# Patient Record
Sex: FEMALE | Race: WHITE | NOT HISPANIC OR LATINO | Employment: FULL TIME | URBAN - METROPOLITAN AREA
[De-identification: names, ages, dates, MRNs, and addresses within clinical notes are randomized per-mention and may not be internally consistent; named-entity substitution may affect disease eponyms.]

---

## 2017-07-01 LAB
ABO + RH BLD: NORMAL
C TRACH RRNA SPEC QL PROBE: NEGATIVE
HBV SURFACE AG SERPL QL IA: NEGATIVE
HCT VFR BLD AUTO: 42 % (ref 36–46)
HGB BLD-MCNC: 14 G/DL (ref 12–16)
HIV 1+2 AB+HIV1 P24 AG SERPL QL IA: NEGATIVE
INDIRECT COOMBS: NEGATIVE
MCV RBC AUTO: 89 FL (ref 82–108)
N GONORRHOEAE, AMPLIFIED DNA: NEGATIVE
PLATELET # BLD AUTO: 311 K/ΜL (ref 150–399)
RPR: NEGATIVE
RUBELLA IMMUNE STATUS: NORMAL

## 2017-07-14 ENCOUNTER — TELEPHONE (OUTPATIENT)
Dept: OBSTETRICS AND GYNECOLOGY | Facility: CLINIC | Age: 32
End: 2017-07-14

## 2017-07-14 NOTE — TELEPHONE ENCOUNTER
----- Message from Frederick Granados sent at 7/14/2017 11:46 AM CDT -----  Please call and schedule new ob appt [phone] wks [phone]

## 2017-07-19 ENCOUNTER — TELEPHONE (OUTPATIENT)
Dept: OBSTETRICS AND GYNECOLOGY | Facility: CLINIC | Age: 32
End: 2017-07-19

## 2017-07-19 LAB — EXT MATERNIT21: NEGATIVE

## 2017-07-19 NOTE — TELEPHONE ENCOUNTER
----- Message from Leanna Villegas sent at 7/19/2017  9:01 AM CDT -----  _x  1st Request  _  2nd Request  _  3rd Request        Who: lion    Why: pt. Needs to schedule for first  initial pregnancy visit. Pt. Stating she is 10 weeks.please call to discuss    What Number to Call Back:[phone]    When to Expect a call back: (With in 24 hours)

## 2017-07-21 ENCOUNTER — TELEPHONE (OUTPATIENT)
Dept: OBSTETRICS AND GYNECOLOGY | Facility: CLINIC | Age: 32
End: 2017-07-21

## 2017-07-21 NOTE — TELEPHONE ENCOUNTER
----- Message from Joanne Jean LPN sent at 7/19/2017  5:02 PM CDT -----  [facility]OLINA Staff  Caller: self (Today,  3:44 PM)         Pt returning a missed call, she can be reached at [phone].

## 2017-08-02 ENCOUNTER — INITIAL PRENATAL (OUTPATIENT)
Dept: OBSTETRICS AND GYNECOLOGY | Facility: CLINIC | Age: 32
End: 2017-08-02
Payer: COMMERCIAL

## 2017-08-02 VITALS
DIASTOLIC BLOOD PRESSURE: 68 MMHG | BODY MASS INDEX: 28.33 KG/M2 | SYSTOLIC BLOOD PRESSURE: 116 MMHG | WEIGHT: 186.31 LBS

## 2017-08-02 DIAGNOSIS — Z34.01 ENCOUNTER FOR SUPERVISION OF NORMAL FIRST PREGNANCY IN FIRST TRIMESTER: ICD-10-CM

## 2017-08-02 PROBLEM — Z34.00 PREGNANCY, SUPERVISION, NORMAL, FIRST: Status: ACTIVE | Noted: 2017-08-02

## 2017-08-02 PROCEDURE — 99999 PR PBB SHADOW E&M-EST. PATIENT-LVL III: CPT | Mod: PBBFAC,,, | Performed by: OBSTETRICS & GYNECOLOGY

## 2017-08-02 PROCEDURE — 0502F SUBSEQUENT PRENATAL CARE: CPT | Mod: S$GLB,,, | Performed by: OBSTETRICS & GYNECOLOGY

## 2017-08-02 RX ORDER — BUTALBITAL, ACETAMINOPHEN AND CAFFEINE 300; 40; 50 MG/1; MG/1; MG/1
CAPSULE ORAL
Qty: 30 CAPSULE | Refills: 12 | Status: SHIPPED | OUTPATIENT
Start: 2017-08-02 | End: 2018-01-18 | Stop reason: SDUPTHER

## 2017-08-02 RX ORDER — BUTALBITAL, ACETAMINOPHEN AND CAFFEINE 300; 40; 50 MG/1; MG/1; MG/1
CAPSULE ORAL
Refills: 0 | COMMUNITY
Start: 2017-07-13 | End: 2017-08-02 | Stop reason: SDUPTHER

## 2017-08-02 NOTE — PROGRESS NOTES
Pregnancy with clomid and IUI.  Will request records from [facility].    Counseled to avoid cat litter, not garden without gloves, avoid raw meat, heat up deli meat, to eat large fish like tuna no more than once a week, and to avoid soft unpasteurized cheeses.  I recommend a PNV daily.  She should avoid ibuprofen.

## 2017-08-09 ENCOUNTER — PATIENT MESSAGE (OUTPATIENT)
Dept: OBSTETRICS AND GYNECOLOGY | Facility: CLINIC | Age: 32
End: 2017-08-09

## 2017-08-30 ENCOUNTER — LAB VISIT (OUTPATIENT)
Dept: LAB | Facility: OTHER | Age: 32
End: 2017-08-30
Attending: OBSTETRICS & GYNECOLOGY
Payer: COMMERCIAL

## 2017-08-30 ENCOUNTER — ROUTINE PRENATAL (OUTPATIENT)
Dept: OBSTETRICS AND GYNECOLOGY | Facility: CLINIC | Age: 32
End: 2017-08-30
Payer: COMMERCIAL

## 2017-08-30 VITALS — BODY MASS INDEX: 28.66 KG/M2 | DIASTOLIC BLOOD PRESSURE: 74 MMHG | SYSTOLIC BLOOD PRESSURE: 118 MMHG | WEIGHT: 188.5 LBS

## 2017-08-30 DIAGNOSIS — Z34.02 ENCOUNTER FOR SUPERVISION OF NORMAL FIRST PREGNANCY IN SECOND TRIMESTER: ICD-10-CM

## 2017-08-30 DIAGNOSIS — Z34.02 ENCOUNTER FOR SUPERVISION OF NORMAL FIRST PREGNANCY IN SECOND TRIMESTER: Primary | ICD-10-CM

## 2017-08-30 PROCEDURE — 36415 COLL VENOUS BLD VENIPUNCTURE: CPT

## 2017-08-30 PROCEDURE — 82105 ALPHA-FETOPROTEIN SERUM: CPT

## 2017-08-30 PROCEDURE — 99999 PR PBB SHADOW E&M-EST. PATIENT-LVL III: CPT | Mod: PBBFAC,,, | Performed by: OBSTETRICS & GYNECOLOGY

## 2017-08-30 PROCEDURE — 0502F SUBSEQUENT PRENATAL CARE: CPT | Mod: S$GLB,,, | Performed by: OBSTETRICS & GYNECOLOGY

## 2017-09-01 LAB
AFP INTERPRETATION: NORMAL
AFP MATERNAL: NEGATIVE
ALPHA FETOPROTEIN MATERNAL: 42.7 NG/ML
ETHNIC ORIGIN: NORMAL
GESTATIONAL AGE (DAYS): 1
GESTATIONAL AGE (WEEKS): 16
GESTATIONAL AGE METHOD: NORMAL
INSULIN DEPEND. DIABETES: NORMAL
M.O.M. ALPHA FETOPROTEIN: 1.47
MATERNAL AGE AT EDD (YRS): 31
MATERNAL WEIGHT (LBS): 188
MULTIPLE GESTATION: NORMAL
SMOKING STATUS FTND: NO

## 2017-09-14 ENCOUNTER — TELEPHONE (OUTPATIENT)
Dept: OBSTETRICS AND GYNECOLOGY | Facility: CLINIC | Age: 32
End: 2017-09-14

## 2017-09-14 NOTE — TELEPHONE ENCOUNTER
----- Message from Lelo Kumar sent at 9/14/2017  2:30 PM CDT -----  Contact: Paula Siu needs to reschedule appointment on 09/28 she will be out of town for work Pt said she can come the following week .Pt can be reached at [phone]

## 2017-10-02 ENCOUNTER — OFFICE VISIT (OUTPATIENT)
Dept: MATERNAL FETAL MEDICINE | Facility: CLINIC | Age: 32
End: 2017-10-02
Attending: OBSTETRICS & GYNECOLOGY
Payer: COMMERCIAL

## 2017-10-02 DIAGNOSIS — Z36.89 ENCOUNTER FOR ULTRASOUND TO CHECK FETAL GROWTH: Primary | ICD-10-CM

## 2017-10-02 DIAGNOSIS — Z34.02 ENCOUNTER FOR SUPERVISION OF NORMAL FIRST PREGNANCY IN SECOND TRIMESTER: ICD-10-CM

## 2017-10-02 DIAGNOSIS — Z36.89 ENCOUNTER FOR FETAL ANATOMIC SURVEY: ICD-10-CM

## 2017-10-02 PROCEDURE — 99499 UNLISTED E&M SERVICE: CPT | Mod: S$GLB,,, | Performed by: OBSTETRICS & GYNECOLOGY

## 2017-10-02 PROCEDURE — 76805 OB US >/= 14 WKS SNGL FETUS: CPT | Mod: S$GLB,,, | Performed by: OBSTETRICS & GYNECOLOGY

## 2017-10-02 NOTE — PROGRESS NOTES
"OB Ultrasound Report (see PDF version under imaging tab)    Indication  ========    Fetal anatomy survey.    Pregnancy History  ===============    Maternal Lab Tests  Test: Cell free fetal DNA analysis  Result:  [id number] negative    Method  ======    Transabdominal ultrasound examination. View: Good view.    Pregnancy  =========    [place] pregnancy. Number of fetuses: 1.    Dating  ======    Cycle: regular cycle  GA by "stated dating" 20 w + 5 d  SHARLA by "stated dating": 2/14/2018  Ultrasound examination on: 10/2/2017  GA by U/S based upon: AC, BPD, Femur, HC  GA by U/S 20 w + 6 d  SHARLA by U/S: 2/13/2018  Assigned: Dating performed on 10/2/2017, based on the external assessment  Assigned GA 20 w + 5 d  Assigned SHARLA: 2/14/2018    General Evaluation  ===============    Cardiac activity: present.  bpm.  Fetal movements: visualized.  Presentation: cephalic.  Placenta: posterior.  Umbilical cord: normal, 3 vessel cord.  Amniotic fluid: normal amount.    Fetal Biometry  ===========    Fetal Biometry  BPD 46.6 mm 20w 1d Hadlock  OFD 65.3 mm 22w 1d Jermaine  .7 mm 20w 4d Hadlock  .9 mm 21w 1d Hadlock  Femur 37.0 mm 21w 5d Hadlock  Cerebellum tr 21.1 mm 20w 5d Still  CM 5.3 mm  Humerus 31.7 mm 20w 4d Jermaine   g 44% Yanick  Calculated by: Hadlock (BPD-HC-AC-FL)  EFW (lb) 0 lb  EFW (oz) 15 oz  Cephalic index 0.71  HC / AC 1.14  FL / BPD 0.79  FL / AC 0.23   bpm  Head / Face / Neck   6.2 mm  Nasal bone 6.5 mm    Fetal Anatomy  ===========    Cranium: normal  Lateral ventricles: normal  Choroid plexus: normal  Midline falx: normal  Cavum septi pellucidi: normal  Cerebellum: normal  Cisterna magna: normal  Rt lateral ventricle: normal  Lt lateral ventricle: normal  Rt choroid plexus: normal  Lt choroid plexus: normal  Lips: normal  Profile: normal  Nose: normal  4-chamber view: normal  RVOT: normal  LVOT: normal  Situs: normal  Cardiac position: normal  Cardiac axis: normal  Cardiac " size: normal  Cardiac rhythm: normal  Rt lung: normal  Lt lung: normal  Diaphragm: normal  Cord insertion: normal  Stomach: normal  Kidneys: normal  Bladder: normal  Genitals: normal  Abdom. wall: appears normal  Abdom. cavity: normal  Rt kidney: normal  Lt kidney: normal  Cervical spine: suboptimal  Thoracic spine: suboptimal  Lumbar spine: suboptimal  Sacral spine: suboptimal  Arms: normal  Legs: normal  Rt arm: normal  Lt arm: normal  Rt hand: present  Lt hand: present  Rt leg: normal  Lt leg: normal  Rt foot: normal  Lt foot: normal    Maternal Structures  ===============    Uterus / Cervix  Uterus: Normal  Cervical length 46.5 mm  Ovaries / Tubes / Adnexa  Rt ovary: Visualized  Lt ovary: Visualized    Impression  =========    A tinajero living IUP is identified. Fetal size is appropriate for established dating. No fetal structural malformations are identified;  however, the anatomic survey is incomplete as noted above. The patient will be scheduled for a f/u exam to complete the anatomic  survey. Cervical length is normal, and placental location is normal without evidence of previa. The placental cord insertion site cannot  be reliably ascertained today and will be reassessed at the follow-up exam.

## 2017-10-04 ENCOUNTER — ROUTINE PRENATAL (OUTPATIENT)
Dept: OBSTETRICS AND GYNECOLOGY | Facility: CLINIC | Age: 32
End: 2017-10-04
Payer: COMMERCIAL

## 2017-10-04 VITALS — SYSTOLIC BLOOD PRESSURE: 124 MMHG | BODY MASS INDEX: 28.96 KG/M2 | DIASTOLIC BLOOD PRESSURE: 64 MMHG | WEIGHT: 190.5 LBS

## 2017-10-04 DIAGNOSIS — Z34.02 ENCOUNTER FOR SUPERVISION OF NORMAL FIRST PREGNANCY IN SECOND TRIMESTER: Primary | ICD-10-CM

## 2017-10-04 PROCEDURE — 0502F SUBSEQUENT PRENATAL CARE: CPT | Mod: S$GLB,,, | Performed by: OBSTETRICS & GYNECOLOGY

## 2017-10-04 PROCEDURE — 87086 URINE CULTURE/COLONY COUNT: CPT

## 2017-10-04 PROCEDURE — 99999 PR PBB SHADOW E&M-EST. PATIENT-LVL III: CPT | Mod: PBBFAC,,, | Performed by: OBSTETRICS & GYNECOLOGY

## 2017-10-04 NOTE — PROGRESS NOTES
Good fm.  Denies ctx, vb, lof.  follow up mfm on 10/31.  Will get flu shot at 's work.  Glucola and cbc on rtc.

## 2017-10-05 LAB — BACTERIA UR CULT: NORMAL

## 2017-10-31 ENCOUNTER — OFFICE VISIT (OUTPATIENT)
Dept: MATERNAL FETAL MEDICINE | Facility: CLINIC | Age: 32
End: 2017-10-31
Attending: OBSTETRICS & GYNECOLOGY
Payer: COMMERCIAL

## 2017-10-31 DIAGNOSIS — Z36.89 ENCOUNTER FOR ULTRASOUND TO CHECK FETAL GROWTH: ICD-10-CM

## 2017-10-31 DIAGNOSIS — O43.199 MARGINAL INSERTION OF UMBILICAL CORD AFFECTING MANAGEMENT OF MOTHER: Primary | ICD-10-CM

## 2017-10-31 PROCEDURE — 99212 OFFICE O/P EST SF 10 MIN: CPT | Mod: 25,S$GLB,, | Performed by: OBSTETRICS & GYNECOLOGY

## 2017-10-31 PROCEDURE — 76816 OB US FOLLOW-UP PER FETUS: CPT | Mod: S$GLB,,, | Performed by: OBSTETRICS & GYNECOLOGY

## 2017-10-31 NOTE — PROGRESS NOTES
"OB Ultrasound Report (see PDF version under imaging tab) and brief consult    Indication  ========    Evaluation of fetal growth Follow-up evaluation of anatomy.    Pregnancy History  ===============    Maternal Lab Tests  Test: Cell free fetal DNA analysis  Result:  [id number] negative    Method  ======    Transabdominal ultrasound examination.    Pregnancy  =========    [place]co pregnancy. Number of fetuses: 1.    Dating  ======    Cycle: regular cycle  GA by "stated dating" 24 w + 6 d  SHARLA by "stated dating": 2/14/2018  Ultrasound examination on: 10/31/2017  GA by U/S based upon: AC, BPD, Femur, HC  GA by U/S 24 w + 5 d  SHARLA by U/S: 2/15/2018  Assigned: Dating performed on 10/2/2017, based on the external assessment  Assigned GA 24 w + 6 d  Assigned SHARLA: 2/14/2018    General Evaluation  ===============    Cardiac activity: present.  bpm.  Fetal movements: visualized.  Presentation: breech.  Placenta: posterior.  Umbilical cord: Cord vessels: 3 vessel cord. Cord insertion: placental insertion: marginal.  Amniotic fluid: normal amount.    Fetal Biometry  ===========    Fetal Biometry  BPD 59.3 mm 24w 2d Hadlock  OFD 78.5 mm 25w 4d Jermaine  .7 mm 24w 3d Hadlock  .3 mm 25w 3d Hadlock  Femur 44.9 mm 24w 6d Hadlock   g 51% Yanick  Calculated by: Hadlock (BPD-HC-AC-FL)  EFW (lb) 1 lb  EFW (oz) 11 oz  Cephalic index 0.76  HC / AC 1.07  FL / BPD 0.76  FL / AC 0.21   bpm    Fetal Anatomy  ===========    Cranium: normal  Posterior fossa: normal  4-chamber view: normal  Stomach: normal  Kidneys: normal  Bladder: normal  Cervical spine: normal  Thoracic spine: normal  Lumbar spine: normal  Sacral spine: normal  Other: A full anatomy survey previously performed.    Consultation  ==========    Type: Abnormal Ultrasound Finding.  I spent 10 minutes on the consultation today with the patient and her spouse regarding findings from today's ultrasound exam. More  than 50% of the consultation was " spent in face-to-face counseling and coordination of care.  A marginal placental cord insertion site (cord inserted onto placenta less than 1 cm from the placental edge) is identified. Marginal  cord insertions (MCI) have a modest association with fetal growth restriction; therefore, a follow-up exam to assess fetal growth at 32 -  34 weeks is recommended and will be scheduled. Additionally, the placental cord insertion site will be reexamined at the follow-up  study. If the placental cord insertion site is located in the lower uterine segment, will also assess for vasa previa by transvaginal scan  with color flow imaging. These possible outcomes and plan for follow-up were discussed with the patient today.    Impression  =========    A follow up fetal anatomical ultrasound was completed today.  The fetal anatomic survey was completed today, and no fetal structural abnormalities were noted. Interval fetal growth has been  normal, and the AFV is normal.  The placental cord insertion site is marginal at the fundal edge of the placenta. A f/u scan will be scheduled at 32 weeks to assess fetal  growth and reassess cord insertion site.

## 2017-11-01 ENCOUNTER — ROUTINE PRENATAL (OUTPATIENT)
Dept: OBSTETRICS AND GYNECOLOGY | Facility: CLINIC | Age: 32
End: 2017-11-01
Payer: COMMERCIAL

## 2017-11-01 VITALS
BODY MASS INDEX: 29.36 KG/M2 | SYSTOLIC BLOOD PRESSURE: 122 MMHG | WEIGHT: 193.13 LBS | DIASTOLIC BLOOD PRESSURE: 74 MMHG

## 2017-11-01 DIAGNOSIS — Z34.02 ENCOUNTER FOR SUPERVISION OF NORMAL FIRST PREGNANCY IN SECOND TRIMESTER: Primary | ICD-10-CM

## 2017-11-01 PROCEDURE — 0502F SUBSEQUENT PRENATAL CARE: CPT | Mod: S$GLB,,, | Performed by: OBSTETRICS & GYNECOLOGY

## 2017-11-01 PROCEDURE — 99999 PR PBB SHADOW E&M-EST. PATIENT-LVL II: CPT | Mod: PBBFAC,,, | Performed by: OBSTETRICS & GYNECOLOGY

## 2017-11-05 ENCOUNTER — PATIENT MESSAGE (OUTPATIENT)
Dept: OBSTETRICS AND GYNECOLOGY | Facility: CLINIC | Age: 32
End: 2017-11-05

## 2017-11-06 ENCOUNTER — CLINICAL SUPPORT (OUTPATIENT)
Dept: OBSTETRICS AND GYNECOLOGY | Facility: CLINIC | Age: 32
End: 2017-11-06
Payer: COMMERCIAL

## 2017-11-06 ENCOUNTER — LAB VISIT (OUTPATIENT)
Dept: LAB | Facility: OTHER | Age: 32
End: 2017-11-06
Attending: NURSE PRACTITIONER
Payer: COMMERCIAL

## 2017-11-06 ENCOUNTER — OFFICE VISIT (OUTPATIENT)
Dept: OBSTETRICS AND GYNECOLOGY | Facility: CLINIC | Age: 32
End: 2017-11-06
Payer: COMMERCIAL

## 2017-11-06 VITALS
SYSTOLIC BLOOD PRESSURE: 124 MMHG | DIASTOLIC BLOOD PRESSURE: 84 MMHG | WEIGHT: 188.94 LBS | BODY MASS INDEX: 28.63 KG/M2 | HEIGHT: 68 IN

## 2017-11-06 DIAGNOSIS — O46.90 VAGINAL BLEEDING IN PREGNANCY: ICD-10-CM

## 2017-11-06 DIAGNOSIS — O46.90 VAGINAL BLEEDING IN PREGNANCY: Primary | ICD-10-CM

## 2017-11-06 DIAGNOSIS — O34.42: ICD-10-CM

## 2017-11-06 DIAGNOSIS — N84.1: ICD-10-CM

## 2017-11-06 LAB
ABO + RH BLD: NORMAL
BLD GP AB SCN CELLS X3 SERPL QL: NORMAL
RH BLD: NORMAL

## 2017-11-06 PROCEDURE — 86850 RBC ANTIBODY SCREEN: CPT

## 2017-11-06 PROCEDURE — 96372 THER/PROPH/DIAG INJ SC/IM: CPT | Mod: S$GLB,,, | Performed by: NURSE PRACTITIONER

## 2017-11-06 PROCEDURE — 99213 OFFICE O/P EST LOW 20 MIN: CPT | Mod: S$GLB,,, | Performed by: OBSTETRICS & GYNECOLOGY

## 2017-11-06 PROCEDURE — 86900 BLOOD TYPING SEROLOGIC ABO: CPT

## 2017-11-06 PROCEDURE — 86901 BLOOD TYPING SEROLOGIC RH(D): CPT

## 2017-11-06 PROCEDURE — 99999 PR PBB SHADOW E&M-EST. PATIENT-LVL II: CPT | Mod: PBBFAC,,,

## 2017-11-06 PROCEDURE — 36415 COLL VENOUS BLD VENIPUNCTURE: CPT

## 2017-11-06 PROCEDURE — 99999 PR PBB SHADOW E&M-EST. PATIENT-LVL I: CPT | Mod: PBBFAC,,,

## 2017-11-06 NOTE — PROGRESS NOTES
Here for rhogam injection , no complaints at this time. Verified patient is RH negative.   No complaint of pain before or after injection. Patient advised to wait 15 minutes before leaving and report any adverse reactions.      Site: LB

## 2017-11-06 NOTE — PROGRESS NOTES
Pt [facility] at 25w5d, with  complaints of vaginal bleeding.  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  Reports spotting continued for 2 days- went from bright red to pink and is now brown.  Reports some associated cramping.  Spec exam- no bleeding, cervix is closed visually.  + cervical polyp noted to os.  Pt is Rh negative- Type and screen and Rhogam today and repeat at 28 weeks.  Pain, bleeding, and PTL precautions given.  F/U scheduled 3 weeks

## 2017-11-27 ENCOUNTER — LAB VISIT (OUTPATIENT)
Dept: LAB | Facility: OTHER | Age: 32
End: 2017-11-27
Attending: OBSTETRICS & GYNECOLOGY
Payer: COMMERCIAL

## 2017-11-27 DIAGNOSIS — Z34.02 ENCOUNTER FOR SUPERVISION OF NORMAL FIRST PREGNANCY IN SECOND TRIMESTER: ICD-10-CM

## 2017-11-27 LAB
ABO + RH BLD: NORMAL
BASOPHILS # BLD AUTO: 0.01 K/UL
BASOPHILS NFR BLD: 0.1 %
BLD GP AB SCN CELLS X3 SERPL QL: NORMAL
BLOOD GROUP ANTIBODIES SERPL: NORMAL
DAT IGG-SP REAG RBC-IMP: NORMAL
DIFFERENTIAL METHOD: ABNORMAL
EOSINOPHIL # BLD AUTO: 0.1 K/UL
EOSINOPHIL NFR BLD: 1.6 %
ERYTHROCYTE [DISTWIDTH] IN BLOOD BY AUTOMATED COUNT: 14.1 %
GLUCOSE SERPL-MCNC: 120 MG/DL
HCT VFR BLD AUTO: 38.3 %
HGB BLD-MCNC: 12.3 G/DL
LYMPHOCYTES # BLD AUTO: 1.3 K/UL
LYMPHOCYTES NFR BLD: 15.2 %
MCH RBC QN AUTO: 29.1 PG
MCHC RBC AUTO-ENTMCNC: 32.1 G/DL
MCV RBC AUTO: 91 FL
MONOCYTES # BLD AUTO: 0.6 K/UL
MONOCYTES NFR BLD: 7.1 %
NEUTROPHILS # BLD AUTO: 6.5 K/UL
NEUTROPHILS NFR BLD: 75.1 %
PLATELET # BLD AUTO: 266 K/UL
PMV BLD AUTO: 11.3 FL
RBC # BLD AUTO: 4.22 M/UL
WBC # BLD AUTO: 8.68 K/UL

## 2017-11-27 PROCEDURE — 86901 BLOOD TYPING SEROLOGIC RH(D): CPT

## 2017-11-27 PROCEDURE — 86900 BLOOD TYPING SEROLOGIC ABO: CPT

## 2017-11-27 PROCEDURE — 85025 COMPLETE CBC W/AUTO DIFF WBC: CPT

## 2017-11-27 PROCEDURE — 86870 RBC ANTIBODY IDENTIFICATION: CPT

## 2017-11-27 PROCEDURE — 82950 GLUCOSE TEST: CPT

## 2017-11-27 PROCEDURE — 86880 COOMBS TEST DIRECT: CPT

## 2017-11-27 PROCEDURE — 36415 COLL VENOUS BLD VENIPUNCTURE: CPT

## 2017-11-29 ENCOUNTER — ROUTINE PRENATAL (OUTPATIENT)
Dept: OBSTETRICS AND GYNECOLOGY | Facility: CLINIC | Age: 32
End: 2017-11-29
Payer: COMMERCIAL

## 2017-11-29 VITALS
WEIGHT: 196.88 LBS | DIASTOLIC BLOOD PRESSURE: 80 MMHG | SYSTOLIC BLOOD PRESSURE: 120 MMHG | BODY MASS INDEX: 29.93 KG/M2

## 2017-11-29 DIAGNOSIS — Z34.03 ENCOUNTER FOR SUPERVISION OF NORMAL FIRST PREGNANCY IN THIRD TRIMESTER: Primary | ICD-10-CM

## 2017-11-29 PROCEDURE — 0502F SUBSEQUENT PRENATAL CARE: CPT | Mod: S$GLB,,, | Performed by: OBSTETRICS & GYNECOLOGY

## 2017-11-29 PROCEDURE — 99999 PR PBB SHADOW E&M-EST. PATIENT-LVL III: CPT | Mod: PBBFAC,,, | Performed by: OBSTETRICS & GYNECOLOGY

## 2017-11-29 RX ORDER — PSEUDOEPHEDRINE HCL 120 MG/1
120 TABLET, FILM COATED, EXTENDED RELEASE ORAL
Status: ON HOLD | COMMUNITY
End: 2018-02-08 | Stop reason: HOSPADM

## 2017-11-29 NOTE — PROGRESS NOTES
Good fm.  Denies ctx, vb, lof.  She complains of URI.  No fevers.  Recommend OTC meds.  If fever or worsening symptoms will treat with zpack.

## 2017-12-12 ENCOUNTER — ROUTINE PRENATAL (OUTPATIENT)
Dept: OBSTETRICS AND GYNECOLOGY | Facility: CLINIC | Age: 32
End: 2017-12-12
Payer: COMMERCIAL

## 2017-12-12 VITALS — BODY MASS INDEX: 30.64 KG/M2 | DIASTOLIC BLOOD PRESSURE: 78 MMHG | WEIGHT: 201.5 LBS | SYSTOLIC BLOOD PRESSURE: 118 MMHG

## 2017-12-12 DIAGNOSIS — Z34.03 ENCOUNTER FOR SUPERVISION OF NORMAL FIRST PREGNANCY IN THIRD TRIMESTER: Primary | ICD-10-CM

## 2017-12-12 PROCEDURE — 0502F SUBSEQUENT PRENATAL CARE: CPT | Mod: S$GLB,,, | Performed by: OBSTETRICS & GYNECOLOGY

## 2017-12-12 PROCEDURE — 99999 PR PBB SHADOW E&M-EST. PATIENT-LVL II: CPT | Mod: PBBFAC,,, | Performed by: OBSTETRICS & GYNECOLOGY

## 2017-12-12 NOTE — PROGRESS NOTES
Complaints today: None. No ctxns, no vb, no LOF. Normal fetal movement.    /78   Wt 91.4 kg (201 lb 8 oz)   LMP 05/10/2017   BMI 30.64 kg/m²     31 y.o., at 30w6d by Estimated Date of Delivery: 18  Patient Active Problem List   Diagnosis    PCOS (polycystic ovarian syndrome)    Pregnancy, supervision, normal, first/breast/condom/flu     OB History    Para Term  AB Living   1 0 0 0 0 0   SAB TAB Ectopic Multiple Live Births   0 0 0 0        # Outcome Date GA Lbr Jose Alejandro/2nd Weight Sex Delivery Anes PTL Lv   1 Current                   Dating reviewed    Allergies and problem list reviewed and updated    Medical and surgical history reviewed    Prenatal labs reviewed and updated    Physical Exam:  ABD: soft, gravid, nontender    Assessment/Plan:  30 yo  at 30 and 6  --tdap next week  --maternity leave papers collected, will fax to employer  --f/u 2 weeks

## 2017-12-19 ENCOUNTER — OFFICE VISIT (OUTPATIENT)
Dept: MATERNAL FETAL MEDICINE | Facility: CLINIC | Age: 32
End: 2017-12-19
Payer: COMMERCIAL

## 2017-12-19 DIAGNOSIS — Z36.89 ENCOUNTER FOR ULTRASOUND TO CHECK FETAL GROWTH: ICD-10-CM

## 2017-12-19 DIAGNOSIS — O43.199 MARGINAL INSERTION OF UMBILICAL CORD AFFECTING MANAGEMENT OF MOTHER: ICD-10-CM

## 2017-12-19 PROCEDURE — 99499 UNLISTED E&M SERVICE: CPT | Mod: S$GLB,,, | Performed by: OBSTETRICS & GYNECOLOGY

## 2017-12-19 PROCEDURE — 76816 OB US FOLLOW-UP PER FETUS: CPT | Mod: S$GLB,,, | Performed by: OBSTETRICS & GYNECOLOGY

## 2017-12-19 NOTE — PROGRESS NOTES
"OB Ultrasound Report (see PDF version under imaging tab)    Indication  ========    Evaluation of fetal growth; marginal cord insertion site.    Pregnancy History  ===============    Maternal Lab Tests  Test: Cell free fetal DNA analysis  Result:  [id number] negative    Method  ======    Transabdominal ultrasound examination.    Pregnancy  =========    [place]co pregnancy. Number of fetuses: 1.    Dating  ======    Cycle: regular cycle  GA by "stated dating" 31 w + 6 d  SHARLA by "stated dating": 2/14/2018  Ultrasound examination on: 12/19/2017  GA by U/S based upon: AC, BPD, Femur, HC  GA by U/S 31 w + 0 d  SHARLA by U/S: 2/20/2018  Assigned: Dating performed on 10/2/2017, based on the external assessment  Assigned GA 31 w + 6 d  Assigned SHARLA: 2/14/2018    General Evaluation  ===============    Cardiac activity: present.  bpm.  Fetal movements: visualized.  Presentation: cephalic.  Placenta:  Placental site: posterior.  Umbilical cord: Cord vessels: 3 vessel cord. Cord insertion: placental insertion: marginal (fundal end of placenta).  Amniotic fluid: MVP 4.4 cm.    Fetal Biometry  ===========    Fetal Biometry  BPD 76.8 mm 30w 6d Hadlock  .4 mm 32w 3d Jermaine  .0 mm 31w 1d Hadlock  .5 mm 31w 0d Hadlock  Femur 59.1 mm 30w 6d Hadlock  EFW 1,665 g 18% Yanick  Calculated by: Hadlock (BPD-HC-AC-FL)  EFW (lb) 3 lb  EFW (oz) 11 oz  Cephalic index 0.76  HC / AC 1.06  FL / BPD 0.77  FL / AC 0.22  MVP 4.4 cm   bpm    Fetal Anatomy  ===========    Cranium: normal  4-chamber view: normal  Stomach: normal  Kidneys: normal  Bladder: normal  Other: A full anatomy survey previously performed.    Impression  =========    Fetal size is AGA with the EFW at the 18th percentile.  Normal repeat limited fetal anatomic survey. AFV is normal. The marginal cord insertion into the fundal edge of the placenta is  confirmed. Follow-up ultrasound scheduled in 4 weeks to assess fetal growth.    "

## 2017-12-22 ENCOUNTER — CLINICAL SUPPORT (OUTPATIENT)
Dept: OBSTETRICS AND GYNECOLOGY | Facility: CLINIC | Age: 32
End: 2017-12-22
Attending: OBSTETRICS & GYNECOLOGY
Payer: COMMERCIAL

## 2017-12-22 PROCEDURE — 90715 TDAP VACCINE 7 YRS/> IM: CPT | Mod: S$GLB,,, | Performed by: OBSTETRICS & GYNECOLOGY

## 2017-12-22 PROCEDURE — 99999 PR PBB SHADOW E&M-EST. PATIENT-LVL I: CPT | Mod: PBBFAC,,,

## 2017-12-22 PROCEDURE — 90471 IMMUNIZATION ADMIN: CPT | Mod: S$GLB,,, | Performed by: OBSTETRICS & GYNECOLOGY

## 2018-01-03 ENCOUNTER — ROUTINE PRENATAL (OUTPATIENT)
Dept: OBSTETRICS AND GYNECOLOGY | Facility: CLINIC | Age: 33
End: 2018-01-03
Payer: COMMERCIAL

## 2018-01-03 VITALS
DIASTOLIC BLOOD PRESSURE: 80 MMHG | BODY MASS INDEX: 31.01 KG/M2 | WEIGHT: 203.94 LBS | SYSTOLIC BLOOD PRESSURE: 130 MMHG

## 2018-01-03 DIAGNOSIS — Z34.03 ENCOUNTER FOR SUPERVISION OF NORMAL FIRST PREGNANCY IN THIRD TRIMESTER: Primary | ICD-10-CM

## 2018-01-03 DIAGNOSIS — O43.193 MARGINAL INSERTION OF UMBILICAL CORD AFFECTING MANAGEMENT OF MOTHER IN THIRD TRIMESTER: ICD-10-CM

## 2018-01-03 PROCEDURE — 99999 PR PBB SHADOW E&M-EST. PATIENT-LVL III: CPT | Mod: PBBFAC,,, | Performed by: STUDENT IN AN ORGANIZED HEALTH CARE EDUCATION/TRAINING PROGRAM

## 2018-01-03 PROCEDURE — 99213 OFFICE O/P EST LOW 20 MIN: CPT | Mod: S$GLB,,, | Performed by: STUDENT IN AN ORGANIZED HEALTH CARE EDUCATION/TRAINING PROGRAM

## 2018-01-03 NOTE — PROGRESS NOTES
Complaints today: Has some sinus congestion. Denies fever, difficulty breathing. No other complaints.     /80   Wt 92.5 kg (203 lb 14.8 oz)   LMP 05/10/2017   BMI 31.01 kg/m²     32 y.o., at 34w0d by Estimated Date of Delivery: 18  Patient Active Problem List   Diagnosis    PCOS (polycystic ovarian syndrome)    Pregnancy, supervision, normal, first/breast/condom/flu    Marginal insertion of umbilical cord affecting management of mother in third trimester     OB History    Para Term  AB Living   1 0 0 0 0 0   SAB TAB Ectopic Multiple Live Births   0 0 0 0        # Outcome Date GA Lbr Jose Alejandro/2nd Weight Sex Delivery Anes PTL Lv   1 Current                   Dating reviewed    Allergies and problem list reviewed and updated    Medical and surgical history reviewed    Prenatal labs reviewed and updated    Physical Exam:  ABD: soft, gravid, nontender, fundal height at 33 cm    Assessment:  G1 at 34w0d    Plan:   1. Marginal cord insertion  - Follow up for growth with MFM later this month  - EFW 18%ile    2. G1 at 34w0d  - GBS next visit  - follow up 2 Weeks, bleeding/pain, kick counts, labor precautions counseled     Bib Baltazar MD  OB/GYN PGY-2  Pager: [phone]

## 2018-01-07 ENCOUNTER — PATIENT MESSAGE (OUTPATIENT)
Dept: OBSTETRICS AND GYNECOLOGY | Facility: CLINIC | Age: 33
End: 2018-01-07

## 2018-01-11 ENCOUNTER — PATIENT MESSAGE (OUTPATIENT)
Dept: ADMINISTRATIVE | Facility: OTHER | Age: 33
End: 2018-01-11

## 2018-01-17 ENCOUNTER — OFFICE VISIT (OUTPATIENT)
Dept: MATERNAL FETAL MEDICINE | Facility: CLINIC | Age: 33
End: 2018-01-17
Payer: COMMERCIAL

## 2018-01-17 ENCOUNTER — HOSPITAL ENCOUNTER (EMERGENCY)
Facility: OTHER | Age: 33
Discharge: HOME | End: 2018-01-17
Attending: OBSTETRICS & GYNECOLOGY
Payer: COMMERCIAL

## 2018-01-17 ENCOUNTER — PATIENT MESSAGE (OUTPATIENT)
Dept: OBSTETRICS AND GYNECOLOGY | Facility: CLINIC | Age: 33
End: 2018-01-17

## 2018-01-17 ENCOUNTER — ROUTINE PRENATAL (OUTPATIENT)
Dept: OBSTETRICS AND GYNECOLOGY | Facility: CLINIC | Age: 33
End: 2018-01-17
Payer: COMMERCIAL

## 2018-01-17 VITALS
SYSTOLIC BLOOD PRESSURE: 140 MMHG | DIASTOLIC BLOOD PRESSURE: 90 MMHG | WEIGHT: 207.88 LBS | BODY MASS INDEX: 31.61 KG/M2

## 2018-01-17 VITALS
SYSTOLIC BLOOD PRESSURE: 116 MMHG | RESPIRATION RATE: 16 BRPM | TEMPERATURE: 97 F | OXYGEN SATURATION: 96 % | DIASTOLIC BLOOD PRESSURE: 73 MMHG | HEART RATE: 89 BPM

## 2018-01-17 DIAGNOSIS — O43.193 MARGINAL INSERTION OF UMBILICAL CORD AFFECTING MANAGEMENT OF MOTHER IN THIRD TRIMESTER: ICD-10-CM

## 2018-01-17 DIAGNOSIS — Z3A.36 36 WEEKS GESTATION OF PREGNANCY: ICD-10-CM

## 2018-01-17 DIAGNOSIS — Z36.89 ENCOUNTER FOR ULTRASOUND TO CHECK FETAL GROWTH: ICD-10-CM

## 2018-01-17 DIAGNOSIS — Z34.03 ENCOUNTER FOR SUPERVISION OF NORMAL FIRST PREGNANCY IN THIRD TRIMESTER: Primary | ICD-10-CM

## 2018-01-17 DIAGNOSIS — G44.209 ACUTE NON INTRACTABLE TENSION-TYPE HEADACHE: ICD-10-CM

## 2018-01-17 DIAGNOSIS — O43.199 MARGINAL INSERTION OF UMBILICAL CORD AFFECTING MANAGEMENT OF MOTHER: ICD-10-CM

## 2018-01-17 DIAGNOSIS — O13.9 GESTATIONAL HYPERTENSION AFFECTING FIRST PREGNANCY: Primary | ICD-10-CM

## 2018-01-17 LAB
ALBUMIN SERPL BCP-MCNC: 3.1 G/DL
ALP SERPL-CCNC: 114 U/L
ALT SERPL W/O P-5'-P-CCNC: 14 U/L
ANION GAP SERPL CALC-SCNC: 7 MMOL/L
AST SERPL-CCNC: 12 U/L
BASOPHILS # BLD AUTO: 0.02 K/UL
BASOPHILS NFR BLD: 0.2 %
BILIRUB SERPL-MCNC: 0.3 MG/DL
BUN SERPL-MCNC: 8 MG/DL
CALCIUM SERPL-MCNC: 9 MG/DL
CHLORIDE SERPL-SCNC: 106 MMOL/L
CO2 SERPL-SCNC: 23 MMOL/L
CREAT SERPL-MCNC: 0.7 MG/DL
CREAT UR-MCNC: 17.6 MG/DL
DIFFERENTIAL METHOD: NORMAL
EOSINOPHIL # BLD AUTO: 0.1 K/UL
EOSINOPHIL NFR BLD: 1.1 %
ERYTHROCYTE [DISTWIDTH] IN BLOOD BY AUTOMATED COUNT: 14 %
EST. GFR  (AFRICAN AMERICAN): >60 ML/MIN/1.73 M^2
EST. GFR  (NON AFRICAN AMERICAN): >60 ML/MIN/1.73 M^2
GLUCOSE SERPL-MCNC: 79 MG/DL
HCT VFR BLD AUTO: 40.1 %
HGB BLD-MCNC: 13.3 G/DL
HIV 1+2 AB+HIV1 P24 AG SERPL QL IA: NEGATIVE
HIV1+2 IGG SERPL QL IA.RAPID: NEGATIVE
LDH SERPL L TO P-CCNC: 159 U/L
LYMPHOCYTES # BLD AUTO: 1.8 K/UL
LYMPHOCYTES NFR BLD: 19.8 %
MCH RBC QN AUTO: 29.8 PG
MCHC RBC AUTO-ENTMCNC: 33.2 G/DL
MCV RBC AUTO: 90 FL
MONOCYTES # BLD AUTO: 0.7 K/UL
MONOCYTES NFR BLD: 7.4 %
NEUTROPHILS # BLD AUTO: 6.4 K/UL
NEUTROPHILS NFR BLD: 70.8 %
PLATELET # BLD AUTO: 232 K/UL
PMV BLD AUTO: 12 FL
POTASSIUM SERPL-SCNC: 3.9 MMOL/L
PROT SERPL-MCNC: 6.4 G/DL
PROT UR-MCNC: <7 MG/DL
PROT/CREAT RATIO, UR: NORMAL
RBC # BLD AUTO: 4.46 M/UL
RPR SER QL: NORMAL
SODIUM SERPL-SCNC: 136 MMOL/L
WBC # BLD AUTO: 9.07 K/UL

## 2018-01-17 PROCEDURE — 99999 PR PBB SHADOW E&M-EST. PATIENT-LVL III: CPT | Mod: PBBFAC,,, | Performed by: OBSTETRICS & GYNECOLOGY

## 2018-01-17 PROCEDURE — 99499 UNLISTED E&M SERVICE: CPT | Mod: S$GLB,,, | Performed by: OBSTETRICS & GYNECOLOGY

## 2018-01-17 PROCEDURE — 86703 HIV-1/HIV-2 1 RESULT ANTBDY: CPT | Mod: 91

## 2018-01-17 PROCEDURE — 0502F SUBSEQUENT PRENATAL CARE: CPT | Mod: S$GLB,,, | Performed by: OBSTETRICS & GYNECOLOGY

## 2018-01-17 PROCEDURE — 80053 COMPREHEN METABOLIC PANEL: CPT

## 2018-01-17 PROCEDURE — 87086 URINE CULTURE/COLONY COUNT: CPT

## 2018-01-17 PROCEDURE — 76816 OB US FOLLOW-UP PER FETUS: CPT | Mod: S$GLB,,, | Performed by: OBSTETRICS & GYNECOLOGY

## 2018-01-17 PROCEDURE — 84156 ASSAY OF PROTEIN URINE: CPT

## 2018-01-17 PROCEDURE — 59025 FETAL NON-STRESS TEST: CPT

## 2018-01-17 PROCEDURE — 99284 EMERGENCY DEPT VISIT MOD MDM: CPT | Mod: 25,,, | Performed by: OBSTETRICS & GYNECOLOGY

## 2018-01-17 PROCEDURE — 87081 CULTURE SCREEN ONLY: CPT

## 2018-01-17 PROCEDURE — 83615 LACTATE (LD) (LDH) ENZYME: CPT

## 2018-01-17 PROCEDURE — 86592 SYPHILIS TEST NON-TREP QUAL: CPT

## 2018-01-17 PROCEDURE — 85025 COMPLETE CBC W/AUTO DIFF WBC: CPT

## 2018-01-17 PROCEDURE — 86703 HIV-1/HIV-2 1 RESULT ANTBDY: CPT

## 2018-01-17 PROCEDURE — 99283 EMERGENCY DEPT VISIT LOW MDM: CPT | Mod: 25

## 2018-01-17 PROCEDURE — 59025 FETAL NON-STRESS TEST: CPT | Mod: 26,,, | Performed by: OBSTETRICS & GYNECOLOGY

## 2018-01-17 NOTE — PROGRESS NOTES
Good fm.  Denies ctx, vb, lof.  She had bad headache last night and vague RUG pain.  bp by me 140/90.  Will send to ob ed for eval.  They will add hiv and rpr to [facility] labs.  gbs done

## 2018-01-17 NOTE — ED TRIAGE NOTES
Pt presented to [facility] from [facility].  Pt had a headache (rated 6/10) yesterday and last night.  Took 2 Fioricets last night and now has no pain.  However, pt's BP has been running 140s/90s and she has been experiencing RUQ pain.  Pt endorses FM and denies VB, LOF, and ctx.  Dr. Jiménez notified of pt's arrival.  Pt's first /97.

## 2018-01-17 NOTE — DISCHARGE INSTRUCTIONS
Return to ER for worsening headache or visual changes. Return to ER for sign/symptoms of labor. Return for blood pressures greater than or equal to 160/100

## 2018-01-17 NOTE — ED PROVIDER NOTES
Encounter Date: 2018       History   No chief complaint on file.    Paula Armendariz is a 32 y.o. [id number] at 36w0d presents for pre-eclampsia workup following elevated blood pressure readings in clinic. She has had mild RUQ pain for one week. Piercing headache last night, fioricet helped initially but it recurred. States her BP has been steadily increasing over the last week. Denies current headache, RUQ, SOB, chest pain, or vision changes.  Patient denies regular contractions, vaginal bleeding, LOF.   Fetal Movement: normal.  This IUP is complicated by marginal cord insertion, EFW 13%.          Review of patient's allergies indicates:  No Known Allergies  Past Medical History:   Diagnosis Date    PCOS (polycystic ovarian syndrome)      Past Surgical History:   Procedure Laterality Date    TONSILLECTOMY       Family History   Problem Relation Age of Onset    Diabetes Maternal Grandmother     Diabetes Maternal Grandfather     Ovarian cancer Neg Hx     Eclampsia Neg Hx      Social History   Substance Use Topics    Smoking status: Never Smoker    Smokeless tobacco: Never Used    Alcohol use No      Comment: socially     Review of Systems   Constitutional: Negative for chills and fatigue.   Eyes: Negative for visual disturbance.   Respiratory: Negative for shortness of breath.    Cardiovascular: Negative for chest pain.   Gastrointestinal: Negative for abdominal pain (no current RUQ pain).   Genitourinary: Negative for vaginal bleeding and vaginal discharge.   Neurological: Negative for headaches.       Physical Exam     Vitals:    18 1019 18 1036 18 1051   BP: (!) 142/97 130/70 123/78   Pulse: 106 90 90   Resp: 16 18 (P) 16   Temp: 97.2 °F (36.2 °C)     TempSrc: Temporal     SpO2: 99% 99% (P) 99%       Physical Exam    Vitals reviewed.  Constitutional: She appears well-developed and well-nourished. She is not diaphoretic. No distress.   HENT:   Head: Normocephalic and atraumatic.    Eyes: EOM are normal.   Neck: Normal range of motion.   Cardiovascular: Normal rate, regular rhythm and normal heart sounds.   Pulmonary/Chest: Breath sounds normal. No respiratory distress. She has no wheezes. She has no rhonchi. She has no rales.   Abdominal: Soft. There is no tenderness.   Gravid    Neurological: She is alert and oriented to person, place, and time. She has normal reflexes.   Skin: Skin is warm and dry.   Psychiatric: She has a normal mood and affect. Her behavior is normal. Judgment and thought content normal.         ED Course   Obtain Fetal nonstress test (NST)  Date/Time: 1/17/2018 3:12 PM  Performed by: REDDY, SUSHMA K  Authorized by: REDDY, SUSHMA K     Nonstress Test:     Variability:  6-25 BPM    Decelerations:  None    Accelerations:  15 bpm    Acoustic Stimulator: No      Baseline:  140    Uterine Irritability: No      Contractions:  Not present  Biophysical Profile:     Nonstress Test Interpretation: reactive      Overall Impression:  Reassuring      Labs Reviewed   COMPREHENSIVE METABOLIC PANEL - Abnormal; Notable for the following:        Result Value    Albumin 3.1 (*)     Anion Gap 7 (*)     All other components within normal limits   LACTATE DEHYDROGENASE   CBC W/ AUTO DIFFERENTIAL   PROTEIN / CREATININE RATIO, URINE   RAPID HIV   RPR   HIV 1 / 2 ANTIBODY              Medical Decision Making:   ED Management:  NST started on arrival: reactive and reassuring.  Tocometry: irreg contractions  BP series started. BP: (116-142)/(70-97) 116/73.  Pre-E labs drawn. Wnl.  3T labs drawn.  Pre-E ROS: no current symptoms.  Patient discharged in stable condition with precautions. Voiced understanding.  Other:   I have discussed this case with another health care provider.       <> Summary of the Discussion: D/w Dr. Dominguez                   ED Course      Clinical Impression:   The primary encounter diagnosis was Gestational hypertension affecting first pregnancy. Diagnoses of 36 weeks  gestation of pregnancy and Acute non intractable tension-type headache were also pertinent to this visit.    Disposition:   Disposition: Discharged  Condition: Stable                        Sushma K Reddy, MD  Resident  01/17/18 2701

## 2018-01-17 NOTE — PROGRESS NOTES
"OB Ultrasound Report (see PDF version under imaging tab)    Indication  ========    Evaluation of fetal growth, marginal cord insertion site.    Pregnancy History  ===============    Maternal Lab Tests  Test: Cell free fetal DNA analysis  Result:  [id number] negative    Wants to know gender: yes    Method  ======    Transabdominal ultrasound examination.    Pregnancy  =========    [place]co pregnancy. Number of fetuses: 1.    Dating  ======    Cycle: regular cycle  GA by "stated dating" 36 w + 0 d  SHARLA by "stated dating": 2/14/2018  Ultrasound examination on: 1/17/2018  GA by U/S based upon: AC, BPD, Femur, HC  GA by U/S 34 w + 0 d  SHARLA by U/S: 2/28/2018  Assigned: Dating performed on 10/2/2017, based on the external assessment  Assigned GA 36 w + 0 d  Assigned SHARLA: 2/14/2018    General Evaluation  ===============    Cardiac activity: present.  bpm.  Fetal movements: visualized.  Presentation: cephalic.  Placenta: posterior.  Umbilical cord: Cord vessels: 3 vessel cord. Cord insertion: placental insertion: marginal.  Amniotic fluid: MVP 5.8 cm.    Biophysical Profile  ===============    2: Fetal breathing movements  2: Gross body movements  2: Fetal tone  2: Amniotic fluid volume  8/8: Biophysical profile score    Fetal Biometry  ===========    Fetal Biometry  BPD 83.3 mm 33w 4d Hadlock  .2 mm 34w 4d Jermaine  .4 mm 33w 4d Hadlock  .0 mm 33w 5d Hadlock  Femur 68.1 mm 35w 0d Hadlock  EFW 2,431 g 13% Yanick  Calculated by: Hadlock (AC-FL)  EFW (lb) 5 lb  EFW (oz) 6 oz  Cephalic index 0.79  HC / AC 1.01  FL / BPD 0.82  FL / AC 0.23  MVP 5.8 cm   bpm    Fetal Anatomy  ===========    Cranium: normal  Posterior fossa: normal  4-chamber view: documented previously  Stomach: normal  Kidneys: normal  Bladder: normal  Wants to know gender: yes  Other: A full anatomy survey previously performed.    Impression  =========    Fetal size is in the lower normal range with the EFW plotting at the " 13th percentile today.  Normal repeat limited fetal anatomic survey. AFV is normal. Will schedule f/u exam to reassess fetal size in 3 weeks.

## 2018-01-18 LAB — BACTERIA UR CULT: NORMAL

## 2018-01-18 RX ORDER — BUTALBITAL, ACETAMINOPHEN AND CAFFEINE 300; 40; 50 MG/1; MG/1; MG/1
CAPSULE ORAL
Qty: 30 CAPSULE | Refills: 12 | Status: SHIPPED | OUTPATIENT
Start: 2018-01-18 | End: 2018-06-18

## 2018-01-19 LAB
BACTERIA SPEC AEROBE CULT: NORMAL
BACTERIA SPEC AEROBE CULT: NORMAL

## 2018-01-24 ENCOUNTER — ROUTINE PRENATAL (OUTPATIENT)
Dept: OBSTETRICS AND GYNECOLOGY | Facility: CLINIC | Age: 33
End: 2018-01-24
Payer: COMMERCIAL

## 2018-01-24 VITALS
WEIGHT: 208.56 LBS | DIASTOLIC BLOOD PRESSURE: 88 MMHG | SYSTOLIC BLOOD PRESSURE: 140 MMHG | BODY MASS INDEX: 31.71 KG/M2

## 2018-01-24 DIAGNOSIS — Z34.03 ENCOUNTER FOR SUPERVISION OF NORMAL FIRST PREGNANCY IN THIRD TRIMESTER: Primary | ICD-10-CM

## 2018-01-24 PROCEDURE — 99999 PR PBB SHADOW E&M-EST. PATIENT-LVL II: CPT | Mod: PBBFAC,,, | Performed by: ADVANCED PRACTICE MIDWIFE

## 2018-01-24 PROCEDURE — 0502F SUBSEQUENT PRENATAL CARE: CPT | Mod: S$GLB,,, | Performed by: ADVANCED PRACTICE MIDWIFE

## 2018-01-24 NOTE — PROGRESS NOTES
In for routine OB visit. BP slightly elevated, pt denies headache, visual disturbances or epigastric pain. Reports good Fm, reinforced BID. Had negative pre e workup on 01/17/18. Reviewed s/s pre e and need to report if occur. RTC 1 week.

## 2018-01-31 ENCOUNTER — ROUTINE PRENATAL (OUTPATIENT)
Dept: OBSTETRICS AND GYNECOLOGY | Facility: CLINIC | Age: 33
End: 2018-01-31
Payer: COMMERCIAL

## 2018-01-31 ENCOUNTER — TELEPHONE (OUTPATIENT)
Dept: OBSTETRICS AND GYNECOLOGY | Facility: CLINIC | Age: 33
End: 2018-01-31

## 2018-01-31 VITALS
BODY MASS INDEX: 32.05 KG/M2 | SYSTOLIC BLOOD PRESSURE: 122 MMHG | WEIGHT: 210.75 LBS | DIASTOLIC BLOOD PRESSURE: 90 MMHG

## 2018-01-31 DIAGNOSIS — Z3A.38 38 WEEKS GESTATION OF PREGNANCY: Primary | ICD-10-CM

## 2018-01-31 PROCEDURE — 99999 PR PBB SHADOW E&M-EST. PATIENT-LVL II: CPT | Mod: PBBFAC,,, | Performed by: NURSE PRACTITIONER

## 2018-01-31 PROCEDURE — 0502F SUBSEQUENT PRENATAL CARE: CPT | Mod: S$GLB,,, | Performed by: NURSE PRACTITIONER

## 2018-01-31 NOTE — TELEPHONE ENCOUNTER
Contacted pt to see if she met her FKC. No answer. Left VM for call back and instructions to go to [facility] if she has not had 5 movements/1 hour.

## 2018-01-31 NOTE — PROGRESS NOTES
Here for routine OB appt at 38w0d, with no complaints. Decreased FM this morning. Pt just woke up before coming to appt today. [place] FM last night-normally more active at night. Discussed going home to do FKC and if not 5 movements in 1 hour to return for NST. Pt and  verbalized understanding. + FHTs Denies LOF, denies VB, irreg BH contractions.  Reviewed warning signs of Labor and Preeclampsia.  Daily FM counts reinforced.  F/U scheduled 1 week

## 2018-02-01 ENCOUNTER — PATIENT MESSAGE (OUTPATIENT)
Dept: OBSTETRICS AND GYNECOLOGY | Facility: CLINIC | Age: 33
End: 2018-02-01

## 2018-02-06 ENCOUNTER — ROUTINE PRENATAL (OUTPATIENT)
Dept: OBSTETRICS AND GYNECOLOGY | Facility: CLINIC | Age: 33
End: 2018-02-06
Payer: COMMERCIAL

## 2018-02-06 VITALS
SYSTOLIC BLOOD PRESSURE: 128 MMHG | BODY MASS INDEX: 31.95 KG/M2 | WEIGHT: 210.13 LBS | DIASTOLIC BLOOD PRESSURE: 84 MMHG

## 2018-02-06 DIAGNOSIS — Z34.00 SUPERVISION OF NORMAL FIRST PREGNANCY, ANTEPARTUM: Primary | ICD-10-CM

## 2018-02-06 DIAGNOSIS — L29.9 ITCHING: ICD-10-CM

## 2018-02-06 PROCEDURE — 99999 PR PBB SHADOW E&M-EST. PATIENT-LVL II: CPT | Mod: PBBFAC,,, | Performed by: NURSE PRACTITIONER

## 2018-02-06 PROCEDURE — 0502F SUBSEQUENT PRENATAL CARE: CPT | Mod: S$GLB,,, | Performed by: NURSE PRACTITIONER

## 2018-02-06 NOTE — PROGRESS NOTES
Here for routine OB appt at 38w6d, with complaints of continued itching to her back, trunk and chest. CMP and bile acids pending.  Reports good FM.  Denies LOF, denies VB, and reports irregular contractions.  Reviewed warning signs of Labor and Preeclampsia.  Daily FM counts reinforced.  Peds- [facility].  Plans to breastfeed- has pump.   F/U MFM scheduled 2/7/18 for growth  F/U scheduled 1 weeks

## 2018-02-07 ENCOUNTER — ANESTHESIA EVENT (OUTPATIENT)
Dept: OBSTETRICS AND GYNECOLOGY | Facility: OTHER | Age: 33
End: 2018-02-07
Payer: COMMERCIAL

## 2018-02-07 ENCOUNTER — OFFICE VISIT (OUTPATIENT)
Dept: MATERNAL FETAL MEDICINE | Facility: CLINIC | Age: 33
End: 2018-02-07
Payer: COMMERCIAL

## 2018-02-07 ENCOUNTER — ANESTHESIA (OUTPATIENT)
Dept: OBSTETRICS AND GYNECOLOGY | Facility: OTHER | Age: 33
End: 2018-02-07
Payer: COMMERCIAL

## 2018-02-07 ENCOUNTER — HOSPITAL ENCOUNTER (INPATIENT)
Facility: OTHER | Age: 33
LOS: 2 days | Discharge: HOME | End: 2018-02-09
Attending: OBSTETRICS & GYNECOLOGY | Admitting: OBSTETRICS & GYNECOLOGY
Payer: COMMERCIAL

## 2018-02-07 ENCOUNTER — PATIENT MESSAGE (OUTPATIENT)
Dept: OBSTETRICS AND GYNECOLOGY | Facility: CLINIC | Age: 33
End: 2018-02-07

## 2018-02-07 ENCOUNTER — TELEPHONE (OUTPATIENT)
Dept: OBSTETRICS AND GYNECOLOGY | Facility: CLINIC | Age: 33
End: 2018-02-07

## 2018-02-07 VITALS — DIASTOLIC BLOOD PRESSURE: 90 MMHG | SYSTOLIC BLOOD PRESSURE: 148 MMHG

## 2018-02-07 DIAGNOSIS — O36.5990 POOR FETAL GROWTH AFFECTING MANAGEMENT OF MOTHER, ANTEPARTUM, SINGLE OR UNSPECIFIED FETUS: ICD-10-CM

## 2018-02-07 DIAGNOSIS — O36.5990 IUGR (INTRAUTERINE GROWTH RESTRICTION) AFFECTING CARE OF MOTHER: Primary | ICD-10-CM

## 2018-02-07 DIAGNOSIS — O43.199 MARGINAL INSERTION OF UMBILICAL CORD AFFECTING MANAGEMENT OF MOTHER: ICD-10-CM

## 2018-02-07 DIAGNOSIS — O13.3 PREGNANCY-INDUCED HYPERTENSION IN THIRD TRIMESTER: ICD-10-CM

## 2018-02-07 DIAGNOSIS — O36.5990 PREGNANCY AFFECTED BY FETAL GROWTH RESTRICTION: ICD-10-CM

## 2018-02-07 DIAGNOSIS — Z36.89 ENCOUNTER FOR ULTRASOUND TO CHECK FETAL GROWTH: ICD-10-CM

## 2018-02-07 DIAGNOSIS — O36.5930 POOR FETAL GROWTH AFFECTING MANAGEMENT OF MOTHER IN THIRD TRIMESTER, SINGLE OR UNSPECIFIED FETUS: ICD-10-CM

## 2018-02-07 LAB
ABO + RH BLD: NORMAL
ALBUMIN SERPL BCP-MCNC: 3.1 G/DL
ALLENS TEST: ABNORMAL
ALP SERPL-CCNC: 115 U/L
ALT SERPL W/O P-5'-P-CCNC: 17 U/L
ANION GAP SERPL CALC-SCNC: 9 MMOL/L
AST SERPL-CCNC: 14 U/L
BASOPHILS # BLD AUTO: 0.02 K/UL
BASOPHILS NFR BLD: 0.2 %
BILIRUB SERPL-MCNC: 0.2 MG/DL
BLD GP AB SCN CELLS X3 SERPL QL: NORMAL
BLOOD GROUP ANTIBODIES SERPL: NORMAL
BUN SERPL-MCNC: 10 MG/DL
CALCIUM SERPL-MCNC: 9.1 MG/DL
CHLORIDE SERPL-SCNC: 107 MMOL/L
CO2 SERPL-SCNC: 21 MMOL/L
CREAT SERPL-MCNC: 0.7 MG/DL
CREAT UR-MCNC: 36.5 MG/DL
DIFFERENTIAL METHOD: ABNORMAL
EOSINOPHIL # BLD AUTO: 0.1 K/UL
EOSINOPHIL NFR BLD: 1.1 %
ERYTHROCYTE [DISTWIDTH] IN BLOOD BY AUTOMATED COUNT: 13.8 %
EST. GFR  (AFRICAN AMERICAN): >60 ML/MIN/1.73 M^2
EST. GFR  (NON AFRICAN AMERICAN): >60 ML/MIN/1.73 M^2
GLUCOSE SERPL-MCNC: 96 MG/DL
HCO3 UR-SCNC: 25.1 MMOL/L (ref 24–28)
HCT VFR BLD AUTO: 39.6 %
HGB BLD-MCNC: 13.1 G/DL
LYMPHOCYTES # BLD AUTO: 1.4 K/UL
LYMPHOCYTES NFR BLD: 15.4 %
MCH RBC QN AUTO: 29.8 PG
MCHC RBC AUTO-ENTMCNC: 33.1 G/DL
MCV RBC AUTO: 90 FL
MONOCYTES # BLD AUTO: 0.7 K/UL
MONOCYTES NFR BLD: 7 %
NEUTROPHILS # BLD AUTO: 7 K/UL
NEUTROPHILS NFR BLD: 75.7 %
PCO2 BLDA: 55 MMHG (ref 35–45)
PH SMN: 7.27 [PH] (ref 7.35–7.45)
PLATELET # BLD AUTO: 207 K/UL
PMV BLD AUTO: 11.7 FL
PO2 BLDA: 8 MMHG (ref 80–100)
POC BE: -2 MMOL/L
POC SATURATED O2: 5 % (ref 95–100)
POTASSIUM SERPL-SCNC: 4 MMOL/L
PROT SERPL-MCNC: 6.3 G/DL
PROT UR-MCNC: 7 MG/DL
PROT/CREAT RATIO, UR: 0.19
RBC # BLD AUTO: 4.39 M/UL
SAMPLE: ABNORMAL
SITE: ABNORMAL
SODIUM SERPL-SCNC: 137 MMOL/L
WBC # BLD AUTO: 9.24 K/UL

## 2018-02-07 PROCEDURE — 88307 TISSUE EXAM BY PATHOLOGIST: CPT | Mod: 26,,, | Performed by: PATHOLOGY

## 2018-02-07 PROCEDURE — 59400 OBSTETRICAL CARE: CPT | Mod: QY,,, | Performed by: ANESTHESIOLOGY

## 2018-02-07 PROCEDURE — 72200005 HC VAGINAL DELIVERY LEVEL II

## 2018-02-07 PROCEDURE — 63600175 PHARM REV CODE 636 W HCPCS: Performed by: STUDENT IN AN ORGANIZED HEALTH CARE EDUCATION/TRAINING PROGRAM

## 2018-02-07 PROCEDURE — 0HQ9XZZ REPAIR PERINEUM SKIN, EXTERNAL APPROACH: ICD-10-PCS | Performed by: OBSTETRICS & GYNECOLOGY

## 2018-02-07 PROCEDURE — 99213 OFFICE O/P EST LOW 20 MIN: CPT | Mod: 25,S$GLB,, | Performed by: OBSTETRICS & GYNECOLOGY

## 2018-02-07 PROCEDURE — 0UQMXZZ REPAIR VULVA, EXTERNAL APPROACH: ICD-10-PCS | Performed by: OBSTETRICS & GYNECOLOGY

## 2018-02-07 PROCEDURE — 85025 COMPLETE CBC W/AUTO DIFF WBC: CPT

## 2018-02-07 PROCEDURE — 27200710 HC EPIDURAL INFUSION PUMP SET: Performed by: STUDENT IN AN ORGANIZED HEALTH CARE EDUCATION/TRAINING PROGRAM

## 2018-02-07 PROCEDURE — 99999 PR PBB SHADOW E&M-EST. PATIENT-LVL I: CPT | Mod: PBBFAC,,, | Performed by: OBSTETRICS & GYNECOLOGY

## 2018-02-07 PROCEDURE — 3E033VJ INTRODUCTION OF OTHER HORMONE INTO PERIPHERAL VEIN, PERCUTANEOUS APPROACH: ICD-10-PCS | Performed by: OBSTETRICS & GYNECOLOGY

## 2018-02-07 PROCEDURE — 59400 OBSTETRICAL CARE: CPT | Mod: GB,,, | Performed by: OBSTETRICS & GYNECOLOGY

## 2018-02-07 PROCEDURE — 62326 NJX INTERLAMINAR LMBR/SAC: CPT | Performed by: STUDENT IN AN ORGANIZED HEALTH CARE EDUCATION/TRAINING PROGRAM

## 2018-02-07 PROCEDURE — 51702 INSERT TEMP BLADDER CATH: CPT

## 2018-02-07 PROCEDURE — 80053 COMPREHEN METABOLIC PANEL: CPT

## 2018-02-07 PROCEDURE — 11000001 HC ACUTE MED/SURG PRIVATE ROOM

## 2018-02-07 PROCEDURE — 72100002 HC LABOR CARE, 1ST 8 HOURS

## 2018-02-07 PROCEDURE — 76816 OB US FOLLOW-UP PER FETUS: CPT | Mod: S$GLB,,, | Performed by: OBSTETRICS & GYNECOLOGY

## 2018-02-07 PROCEDURE — 25000003 PHARM REV CODE 250: Performed by: STUDENT IN AN ORGANIZED HEALTH CARE EDUCATION/TRAINING PROGRAM

## 2018-02-07 PROCEDURE — 76819 FETAL BIOPHYS PROFIL W/O NST: CPT | Mod: S$GLB,,, | Performed by: OBSTETRICS & GYNECOLOGY

## 2018-02-07 PROCEDURE — 82803 BLOOD GASES ANY COMBINATION: CPT

## 2018-02-07 PROCEDURE — 10907ZC DRAINAGE OF AMNIOTIC FLUID, THERAPEUTIC FROM PRODUCTS OF CONCEPTION, VIA NATURAL OR ARTIFICIAL OPENING: ICD-10-PCS | Performed by: OBSTETRICS & GYNECOLOGY

## 2018-02-07 PROCEDURE — 99900035 HC TECH TIME PER 15 MIN (STAT)

## 2018-02-07 PROCEDURE — 84156 ASSAY OF PROTEIN URINE: CPT

## 2018-02-07 PROCEDURE — 3008F BODY MASS INDEX DOCD: CPT | Mod: S$GLB,,, | Performed by: OBSTETRICS & GYNECOLOGY

## 2018-02-07 PROCEDURE — 27800517 HC TRAY,EPIDURAL-CONTINUOUS: Performed by: STUDENT IN AN ORGANIZED HEALTH CARE EDUCATION/TRAINING PROGRAM

## 2018-02-07 PROCEDURE — 86850 RBC ANTIBODY SCREEN: CPT

## 2018-02-07 PROCEDURE — 88307 TISSUE EXAM BY PATHOLOGIST: CPT | Performed by: PATHOLOGY

## 2018-02-07 PROCEDURE — 86870 RBC ANTIBODY IDENTIFICATION: CPT

## 2018-02-07 RX ORDER — DIPHENHYDRAMINE HCL 25 MG
25 CAPSULE ORAL EVERY 4 HOURS PRN
Status: DISCONTINUED | OUTPATIENT
Start: 2018-02-07 | End: 2018-02-09 | Stop reason: HOSPADM

## 2018-02-07 RX ORDER — TERBUTALINE SULFATE 1 MG/ML
0.25 INJECTION SUBCUTANEOUS
Status: DISCONTINUED | OUTPATIENT
Start: 2018-02-07 | End: 2018-02-07

## 2018-02-07 RX ORDER — ONDANSETRON 8 MG/1
8 TABLET, ORALLY DISINTEGRATING ORAL EVERY 8 HOURS PRN
Status: DISCONTINUED | OUTPATIENT
Start: 2018-02-07 | End: 2018-02-09 | Stop reason: HOSPADM

## 2018-02-07 RX ORDER — DOCUSATE SODIUM 100 MG/1
200 CAPSULE, LIQUID FILLED ORAL 2 TIMES DAILY PRN
Status: DISCONTINUED | OUTPATIENT
Start: 2018-02-07 | End: 2018-02-09 | Stop reason: HOSPADM

## 2018-02-07 RX ORDER — ACETAMINOPHEN 325 MG/1
650 TABLET ORAL EVERY 6 HOURS PRN
Status: DISCONTINUED | OUTPATIENT
Start: 2018-02-07 | End: 2018-02-09 | Stop reason: HOSPADM

## 2018-02-07 RX ORDER — ONDANSETRON 8 MG/1
8 TABLET, ORALLY DISINTEGRATING ORAL EVERY 8 HOURS PRN
Status: DISCONTINUED | OUTPATIENT
Start: 2018-02-07 | End: 2018-02-07

## 2018-02-07 RX ORDER — FENTANYL/BUPIVACAINE/NS/PF 2MCG/ML-.1
PLASTIC BAG, INJECTION (ML) INJECTION
Status: DISPENSED
Start: 2018-02-07 | End: 2018-02-08

## 2018-02-07 RX ORDER — OXYTOCIN/RINGER'S LACTATE 20/1000 ML
2 PLASTIC BAG, INJECTION (ML) INTRAVENOUS CONTINUOUS
Status: DISCONTINUED | OUTPATIENT
Start: 2018-02-07 | End: 2018-02-07

## 2018-02-07 RX ORDER — BUPIVACAINE HYDROCHLORIDE 2.5 MG/ML
INJECTION, SOLUTION EPIDURAL; INFILTRATION; INTRACAUDAL
Status: DISPENSED
Start: 2018-02-07 | End: 2018-02-08

## 2018-02-07 RX ORDER — MISOPROSTOL 200 UG/1
600 TABLET ORAL
Status: DISCONTINUED | OUTPATIENT
Start: 2018-02-07 | End: 2018-02-07

## 2018-02-07 RX ORDER — SODIUM CHLORIDE 9 MG/ML
INJECTION, SOLUTION INTRAVENOUS
Status: DISCONTINUED | OUTPATIENT
Start: 2018-02-07 | End: 2018-02-07

## 2018-02-07 RX ORDER — IBUPROFEN 400 MG/1
800 TABLET ORAL EVERY 8 HOURS
Status: DISCONTINUED | OUTPATIENT
Start: 2018-02-07 | End: 2018-02-09 | Stop reason: HOSPADM

## 2018-02-07 RX ORDER — MISOPROSTOL 200 UG/1
TABLET ORAL
Status: DISPENSED
Start: 2018-02-07 | End: 2018-02-08

## 2018-02-07 RX ORDER — HYDROCODONE BITARTRATE AND ACETAMINOPHEN 5; 325 MG/1; MG/1
1 TABLET ORAL EVERY 4 HOURS PRN
Status: DISCONTINUED | OUTPATIENT
Start: 2018-02-07 | End: 2018-02-09 | Stop reason: HOSPADM

## 2018-02-07 RX ORDER — SODIUM CHLORIDE, SODIUM LACTATE, POTASSIUM CHLORIDE, CALCIUM CHLORIDE 600; 310; 30; 20 MG/100ML; MG/100ML; MG/100ML; MG/100ML
INJECTION, SOLUTION INTRAVENOUS CONTINUOUS
Status: DISCONTINUED | OUTPATIENT
Start: 2018-02-07 | End: 2018-02-07

## 2018-02-07 RX ORDER — DIPHENHYDRAMINE HYDROCHLORIDE 50 MG/ML
25 INJECTION INTRAMUSCULAR; INTRAVENOUS EVERY 4 HOURS PRN
Status: DISCONTINUED | OUTPATIENT
Start: 2018-02-07 | End: 2018-02-09 | Stop reason: HOSPADM

## 2018-02-07 RX ORDER — HYDROCODONE BITARTRATE AND ACETAMINOPHEN 10; 325 MG/1; MG/1
1 TABLET ORAL EVERY 4 HOURS PRN
Status: DISCONTINUED | OUTPATIENT
Start: 2018-02-07 | End: 2018-02-09 | Stop reason: HOSPADM

## 2018-02-07 RX ORDER — CARBOPROST TROMETHAMINE 250 UG/ML
INJECTION, SOLUTION INTRAMUSCULAR
Status: DISCONTINUED
Start: 2018-02-07 | End: 2018-02-07 | Stop reason: WASHOUT

## 2018-02-07 RX ORDER — HYDROCORTISONE 25 MG/G
CREAM TOPICAL 3 TIMES DAILY PRN
Status: DISCONTINUED | OUTPATIENT
Start: 2018-02-07 | End: 2018-02-09 | Stop reason: HOSPADM

## 2018-02-07 RX ORDER — METHYLERGONOVINE MALEATE 0.2 MG/ML
INJECTION INTRAVENOUS
Status: DISCONTINUED
Start: 2018-02-07 | End: 2018-02-07 | Stop reason: WASHOUT

## 2018-02-07 RX ORDER — OXYTOCIN/RINGER'S LACTATE 20/1000 ML
41.65 PLASTIC BAG, INJECTION (ML) INTRAVENOUS CONTINUOUS
Status: ACTIVE | OUTPATIENT
Start: 2018-02-07 | End: 2018-02-08

## 2018-02-07 RX ORDER — OXYTOCIN 10 [USP'U]/ML
INJECTION, SOLUTION INTRAMUSCULAR; INTRAVENOUS
Status: DISCONTINUED
Start: 2018-02-07 | End: 2018-02-07 | Stop reason: WASHOUT

## 2018-02-07 RX ADMIN — IBUPROFEN 800 MG: 400 TABLET, FILM COATED ORAL at 10:02

## 2018-02-07 RX ADMIN — Medication 2 MILLI-UNITS/MIN: at 05:02

## 2018-02-07 RX ADMIN — MISOPROSTOL 50 MCG: 100 TABLET ORAL at 12:02

## 2018-02-07 RX ADMIN — Medication 333 MILLI-UNITS/MIN: at 08:02

## 2018-02-07 RX ADMIN — SODIUM CHLORIDE, SODIUM LACTATE, POTASSIUM CHLORIDE, AND CALCIUM CHLORIDE: .6; .31; .03; .02 INJECTION, SOLUTION INTRAVENOUS at 11:02

## 2018-02-07 RX ADMIN — Medication 41.65 MILLI-UNITS/MIN: at 09:02

## 2018-02-07 NOTE — TELEPHONE ENCOUNTER
Called pt to schedule OB appt with Dr. Carlin on 02/08/2018. No answer. Left [employer] message. Sent [employer] message.

## 2018-02-07 NOTE — PROGRESS NOTES
"Indication  ========    marginal cord insert; evaluation of fetal growth.    Pregnancy History  ==============    Maternal Lab Tests  Test: Cell free fetal DNA analysis  Result:  [id number] negative    Wants to know gender: yes    Maternal Assessment  =================    BP syst 154 mmHg  BP diast 88 mmHg  Other: 148/90    Method  ======    Transabdominal ultrasound examination, Voluson E10. View: Sufficient.    Pregnancy  =========    Pacheco pregnancy. Number of fetuses: 1.    Dating  ======    Cycle: regular cycle  GA by "stated dating" 39 w + 0 d  SHARLA by "stated dating": 2/14/2018  Ultrasound examination on: 2/7/2018  GA by U/S based upon: AC, BPD, Femur, HC  GA by U/S 34 w + 3 d  SHARLA by U/S: 3/18/2018  Assigned: Dating performed on 10/2/2017, based on the external assessment  Assigned GA 39 w + 0 d  Assigned SHARLA: 2/14/2018    General Evaluation  ==============    Cardiac activity: present.  bpm.  Fetal movements: visualized.  Presentation: cephalic.  Placenta: posterior; marginal cord insert.  Umbilical cord: 3 vessel cord.  Amniotic fluid: normal amountMVP 3.6 cm.    Biophysical Profile  ==============    2: Fetal breathing movements  2: Gross body movements  2: Fetal tone  2: Amniotic fluid volume  8/8: Biophysical profile score    Fetal Biometry  ============    Fetal Biometry  BPD 84.5 mm 34w 0d Hadlock  .6 mm 35w 6d Jermaine  .9 mm 34w 2d Hadlock  .9 mm 34w 0d Hadlock  Femur 68.6 mm 35w 2d Hadlock  EFW 2,412 g 1% Yanick  Calculated by: Hadlock (BPD-HC-AC-FL)  EFW (lb) 5 lb  EFW (oz) 5 oz  Cephalic index 0.78  HC / AC 1.03  FL / BPD 0.81  FL / AC 0.23  MVP 3.6 cm   bpm    Fetal Anatomy  ===========    Cranium: normal  4-chamber view: documented previously  Stomach: normal  Kidneys: normal  Bladder: normal  Genitals: normal  Gender: female  Wants to know gender: yes    Fetal Doppler  ===========    Umbilical Cord  Umbilical A PS -34.90 cm/s  Umbilical A ED 7.90 " cm/s  Umbilical A TAmax -19.65 cm/s  Umbilical A MD 7.64 cm/s  Umbilical A RI 0.72 98% Ronaldo  Umbilical A PI 1.33 >99% Ronaldo  Umbilical A S / D 3.74 >99% Ronaldo  Umbilical A  bpm  Head / Brain  Rt MCA PI divided by: Umbilical artery PI  Lt MCA PI divided by: Umbilical artery PI    Maternal Structures  ===============    Ovaries / Tubes / Adnexa  Rt ovary: Visualized  Lt ovary: Visualized    Consultation  ==========    Consultation for IUGR.  We discussed the findings from today's ultrasound. Suboptimal interval fetal growth and new onset IUGR at the 1st percentile. Discussed at  this gestational age, the recommendations are to proceed today with induction of labor. Discussed early epidural and continuous fetal  monitoring during labor induction process. Negative cffDNA and no structural malformations identified on prior examination. Etiology likely  related to hypertensive disorder of pregnancy versus placental insufficiency. Recommend sending placenta to pathology. No sonographic  markers of fetal infection. Patient has known gestational hypertension, needs labs for preeclampsia evaluation.  Time  I overall spent approximately 15 minutes in face to face time with the patient and her family, greater than 50% of which was in counseling and  care coordination.    Impression  =========    Pacheco, living IUP in vertex presentation.  Suboptimal interval fetal growth (EFW 3 weeks ago 2431 g) and EFW now plots at the 1st percentile for gestational age.  The AFV is normal.  The BPP is 8/8-reassuring fetal testing.  Doppler interrogation of the umbilical arteries shows the presence of diastolic flow; however, the S/D ratio is elevated above the 95th  percentile.    Recommendation  ==============    Recommend induction of labor. L&D staff notified. Patient returning to L&D in 1 hour.  Recommend sending labs for preeclampsia evaluation (known gestational hypertension, now with IUGR).

## 2018-02-07 NOTE — ASSESSMENT & PLAN NOTE
- Consents signed and to chart  - Admit to Labor and Delivery unit  - Plan for induction is cook balloon, cytotec, AROM, pitocin  - Epidural per Anesthesia  - Draw CBC, T&S  - Notify Staff  - Ultrasound performed, fetus in vertex position.   - Recheck in 2-3 hrs or PRN  - Post-Partum Hemorrhage risk - low

## 2018-02-07 NOTE — ANESTHESIA PREPROCEDURE EVALUATION
2018  Paula Armendariz is a 32 y.o. female  at 39w0d who presents for IOL in setting of IUGR. IUP otherwise complicated by marginal cord insertion and gestational HTN - labs previously were negative for pre-E. PMH otherwise significant for mild asthma, last used albuterol inhaler over a year ago. No recent respiratory issues. No abdominal/back surgeries, no bleeding disorders. Desires epidural when appropriate.     OB History    Para Term  AB Living   1 0 0 0 0 0   SAB TAB Ectopic Multiple Live Births   0 0 0 0        # Outcome Date GA Lbr Jose Alejandro/2nd Weight Sex Delivery Anes PTL Lv   1 Current                   Wt Readings from Last 1 Encounters:   18 1149 95.3 kg (210 lb 1.6 oz)       BP Readings from Last 3 Encounters:   18 (!) 148/90   18 128/84   18 (!) 122/90       Patient Active Problem List   Diagnosis    PCOS (polycystic ovarian syndrome)    Pregnancy, supervision, normal, first/breast/condom/flu    Marginal insertion of umbilical cord affecting management of mother in third trimester       Past Surgical History:   Procedure Laterality Date    TONSILLECTOMY         Social History     Social History    Marital status:      Spouse name: N/A    Number of children: N/A    Years of education: N/A     Occupational History    Not on file.     Social History Main Topics    Smoking status: Never Smoker    Smokeless tobacco: Never Used    Alcohol use No      Comment: socially    Drug use: No    Sexual activity: Yes     Partners: Male     Birth control/ protection: None     Other Topics Concern    Not on file     Social History Narrative    No narrative on file         Chemistry        Component Value Date/Time     2018 1222    K 3.9 2018 1222     2018 1222    CO2 23 2018 1222    BUN 9 2018 1222     CREATININE 0.7 02/06/2018 1222    GLU 87 02/06/2018 1222        Component Value Date/Time    CALCIUM 8.8 02/06/2018 1222    ALKPHOS 122 02/06/2018 1222    AST 14 02/06/2018 1222    ALT 18 02/06/2018 1222    BILITOT 0.3 02/06/2018 1222    ESTGFRAFRICA >60 02/06/2018 1222    EGFRNONAA >60 02/06/2018 1222            Lab Results   Component Value Date    WBC 9.07 01/17/2018    HGB 13.3 01/17/2018    HCT 40.1 01/17/2018    MCV 90 01/17/2018     01/17/2018       No results for input(s): PT, INR, PROTIME, APTT in the last 72 hours.    Anesthesia Evaluation    I have reviewed the Patient Summary Reports.     I have reviewed the Medications.     Review of Systems  Anesthesia Hx:  History of prior surgery of interest to airway management or planning: Denies Family Hx of Anesthesia complications.   Denies Personal Hx of Anesthesia complications.   Cardiovascular:   Hypertension    Pulmonary:   Asthma mild    Renal/:  Renal/ Normal     Hepatic/GI:  Hepatic/GI Normal    Musculoskeletal:  Musculoskeletal Normal    Neurological:  Neurology Normal    Endocrine:  Endocrine Normal        Physical Exam  General:  Well nourished    Airway/Jaw/Neck:  Airway Findings: Mouth Opening: Normal Tongue: Normal  Mallampati: II  TM Distance: Normal, at least 6 cm  Jaw/Neck Findings:  Neck ROM: Normal ROM      Dental:  Dental Findings: In tact   Chest/Lungs:  Chest/Lungs Findings: Clear to auscultation, Normal Respiratory Rate     Heart/Vascular:  Heart Findings: Normal       Mental Status:  Mental Status Findings:  Cooperative, Alert and Oriented         Anesthesia Plan  Type of Anesthesia, risks & benefits discussed:  Anesthesia Type:  CSE, epidural, general, spinal  Patient's Preference:   Intra-op Monitoring Plan: standard ASA monitors  Intra-op Monitoring Plan Comments:   Post Op Pain Control Plan: per primary service following discharge from PACU and IV/PO Opioids PRN  Post Op Pain Control Plan Comments:   Induction:   IV  Beta  Blocker:  Patient is not currently on a Beta-Blocker (No further documentation required).       Informed Consent: Patient understands risks and agrees with Anesthesia plan.  Questions answered. Anesthesia consent signed with patient.  ASA Score: 2     Day of Surgery Review of History & Physical:    H&P update referred to the surgeon.         Ready For Surgery From Anesthesia Perspective.

## 2018-02-07 NOTE — NURSING
20cc was added to each port of the Cooks catheter for a total of 80cc in each port, pt tolerating catheter well, no complaints, will continue to monitor.

## 2018-02-07 NOTE — LETTER
February 7, 2018      Mirna Ruiz, NP  [address]  [address]  [place] [address]           [facility]  [address]  [place] [facility] [address]  Phone: [phone]          Patient: Paula Armendariz   MR Number: [id number]   YOB: 1985   Date of Visit: 2/7/2018       Dear Mirna Ruiz:    Thank you for referring Paula Armendariz to me for evaluation. Attached you will find relevant portions of my assessment and plan of care.    If you have questions, please do not hesitate to call me. I look forward to following Paula Armendariz along with you.    Sincerely,    Blaire Alonso MD    Enclosure  CC:  No Recipients    If you would like to receive this communication electronically, please contact [email or web address] or [phone] to request more information on [facility] Link access.    For providers and/or their staff who would like to refer a patient to [facility], please contact us through our one-stop-shop provider referral line, [facility], at [phone].    If you feel you have received this communication in error or would no longer like to receive these types of communications, please e-mail [email or web address]

## 2018-02-07 NOTE — SUBJECTIVE & OBJECTIVE
Obstetric History       T0      L0     SAB0   TAB0   Ectopic0   Multiple0   Live Births0       # Outcome Date GA Lbr Jose Alejandro/2nd Weight Sex Delivery Anes PTL Lv   1 Current                 Past Medical History:   Diagnosis Date    PCOS (polycystic ovarian syndrome)      Past Surgical History:   Procedure Laterality Date    TONSILLECTOMY         PTA Medications   Medication Sig    butalbital-acetaminophen-caff -40 mg Cap TAKE 1 OR 2 CAPSULES BY MOUTH AS DIRECTED.    PRENATAL VIT/IRON FUM/FOLIC AC (PRENATAL 1+1 ORAL) Take by mouth.    pseudoephedrine (SUDAFED) 120 mg 12 hr tablet Take 120 mg by mouth every 12 (twelve) hours.       Review of patient's allergies indicates:  No Known Allergies     Family History     Problem Relation (Age of Onset)    Diabetes Maternal Grandmother, Maternal Grandfather        Social History Main Topics    Smoking status: Never Smoker    Smokeless tobacco: Never Used    Alcohol use No      Comment: socially    Drug use: No    Sexual activity: Yes     Partners: Male     Birth control/ protection: None     Review of Systems   Constitutional: Negative for chills and fever.   Eyes: Negative for visual disturbance.   Respiratory: Negative for shortness of breath.    Cardiovascular: Negative for chest pain.   Gastrointestinal: Negative for abdominal pain.   Genitourinary: Negative for vaginal bleeding and vaginal discharge.   Neurological: Negative for headaches.      Objective:     Vital Signs (Most Recent):  Temp: 98.1 °F (36.7 °C) (18 1110)  Pulse: 101 (18 1124)  Resp: 18 (18 1110)  SpO2: 99 % (18 1121) Vital Signs (24h Range):  Temp:  [98.1 °F (36.7 °C)] 98.1 °F (36.7 °C)  Pulse:  [100-108] 101  Resp:  [18] 18  SpO2:  [98 %-99 %] 99 %  BP: (148-154)/(88-90) 148/90     Weight: 95.3 kg (210 lb)  Body mass index is 31.93 kg/m².    FHT: 150 baseline, mod btbv, + accels, + occ decels. Cat 2 (reassuring)  TOCO: irregular    Physical Exam:    Constitutional: She is oriented to person, place, and time. She appears well-developed and well-nourished. No distress.    HENT:   Head: Normocephalic and atraumatic.    Eyes: EOM are normal.    Neck: Normal range of motion.    Cardiovascular: Normal rate.     Pulmonary/Chest: No respiratory distress.        Abdominal: Soft. There is no tenderness.                 Neurological: She is alert and oriented to person, place, and time.    Skin: She is not diaphoretic.    Psychiatric: She has a normal mood and affect. Her behavior is normal. Judgment and thought content normal.       Cervix:  Dilation:  1  Effacement:  50%  Station: -3  Presentation: Vertex     Significant Labs:  Lab Results   Component Value Date    GROUPTRH O NEG 11/27/2017    HEPBSAG Negative 07/01/2017    STREPBCULT No Group B Streptococcus isolated 01/17/2018    STREPBCULT No Group B Streptococcus isolated 01/17/2018       I have personallly reviewed all pertinent lab results from the last 24 hours.

## 2018-02-07 NOTE — HPI
Paula Armendariz is a 32 y.o. [id number] at 39w0d presents from [facility] for IOL due to ultrasound showing IUGR with fetus in the 1st percentile. She has no complaints and denies contractions, LOF, or vaginal bleeding. She notes good fetal movement.   This IUP is complicated by IUGR, gHTN, marginal cord insertion, and h/o PCOS.

## 2018-02-07 NOTE — ASSESSMENT & PLAN NOTE
[facility] ultrasound today showed IUGR with EFW 2,412 g, 1%  Admitted to L&D for IOL  - Consents signed and to chart  - Admit to Labor and Delivery unit  - Plan for induction is cook balloon, cytotec, AROM, pitocin  - Epidural per Anesthesia  - Draw CBC, T&S  - Notify Staff  - Ultrasound performed, fetus in vertex position.   - Recheck in 2-3 hrs or PRN  - Post-Partum Hemorrhage risk - low

## 2018-02-07 NOTE — H&P
[facility]  Obstetrics  History & Physical    Patient Name: Paula Armendariz  MRN: [id number]  Admission Date: 2018  Primary Care Provider: Primary Doctor No    Subjective:     Principal Problem:IUGR (intrauterine growth restriction) affecting care of mother    History of Present Illness:  Paula Armendariz is a 32 y.o. [id number] at 39w0d presents from [facility] for IOL due to ultrasound showing IUGR with fetus in the 1st percentile. She has no complaints and denies contractions, LOF, or vaginal bleeding. She notes good fetal movement.   This IUP is complicated by IUGR, gHTN, marginal cord insertion, and h/o PCOS.      Obstetric History       T0      L0     SAB0   TAB0   Ectopic0   Multiple0   Live Births0       # Outcome Date GA Lbr Jose Alejandro/2nd Weight Sex Delivery Anes PTL Lv   1 Current                 Past Medical History:   Diagnosis Date    PCOS (polycystic ovarian syndrome)      Past Surgical History:   Procedure Laterality Date    TONSILLECTOMY         PTA Medications   Medication Sig    butalbital-acetaminophen-caff -40 mg Cap TAKE 1 OR 2 CAPSULES BY MOUTH AS DIRECTED.    PRENATAL VIT/IRON FUM/FOLIC AC (PRENATAL 1+1 ORAL) Take by mouth.    pseudoephedrine (SUDAFED) 120 mg 12 hr tablet Take 120 mg by mouth every 12 (twelve) hours.       Review of patient's allergies indicates:  No Known Allergies     Family History     Problem Relation (Age of Onset)    Diabetes Maternal Grandmother, Maternal Grandfather        Social History Main Topics    Smoking status: Never Smoker    Smokeless tobacco: Never Used    Alcohol use No      Comment: socially    Drug use: No    Sexual activity: Yes     Partners: Male     Birth control/ protection: None     Review of Systems   Constitutional: Negative for chills and fever.   Eyes: Negative for visual disturbance.   Respiratory: Negative for shortness of breath.    Cardiovascular: Negative for chest pain.   Gastrointestinal:  Negative for abdominal pain.   Genitourinary: Negative for vaginal bleeding and vaginal discharge.   Neurological: Negative for headaches.      Objective:     Vital Signs (Most Recent):  Temp: 98.1 °F (36.7 °C) (18 1110)  Pulse: 101 (18 1124)  Resp: 18 (18 1110)  SpO2: 99 % (18 1121) Vital Signs (24h Range):  Temp:  [98.1 °F (36.7 °C)] 98.1 °F (36.7 °C)  Pulse:  [100-108] 101  Resp:  [18] 18  SpO2:  [98 %-99 %] 99 %  BP: (148-154)/(88-90) 148/90     Weight: 95.3 kg (210 lb)  Body mass index is 31.93 kg/m².    FHT: 150 baseline, mod btbv, + accels, + occ decels. Cat 2 (reassuring)  TOCO: irregular    Physical Exam:   Constitutional: She is oriented to person, place, and time. She appears well-developed and well-nourished. No distress.    HENT:   Head: Normocephalic and atraumatic.    Eyes: EOM are normal.    Neck: Normal range of motion.    Cardiovascular: Normal rate.     Pulmonary/Chest: No respiratory distress.        Abdominal: Soft. There is no tenderness.                 Neurological: She is alert and oriented to person, place, and time.    Skin: She is not diaphoretic.    Psychiatric: She has a normal mood and affect. Her behavior is normal. Judgment and thought content normal.       Cervix:  Dilation:  1  Effacement:  50%  Station: -3  Presentation: Vertex     Significant Labs:  Lab Results   Component Value Date    GROUPTRH O NEG 2017    HEPBSAG Negative 2017    STREPBCULT No Group B Streptococcus isolated 2018    STREPBCULT No Group B Streptococcus isolated 2018       I have personallly reviewed all pertinent lab results from the last 24 hours.    Assessment/Plan:     32 y.o. female  at 39w0d for:    * IUGR (intrauterine growth restriction) affecting care of mother    MFM ultrasound today showed IUGR with EFW 2,412 g, 1%  Admitted to L&D for IOL  - Consents signed and to chart  - Admit to Labor and Delivery unit  - Plan for induction is faraz balloon,  cytotec, AROM, pitocin  - Epidural per Anesthesia  - Draw CBC, T&S  - Notify Staff  - Ultrasound performed, fetus in vertex position.   - Recheck in 2-3 hrs or PRN  - Post-Partum Hemorrhage risk - low            Sushma K Reddy, MD  Obstetrics  [facility]    Doing well, no questions,no problems.  I have reviewed the resident's note, evaluated the patient and agree with the diagnosis and management plan

## 2018-02-07 NOTE — PROGRESS NOTES
LABOR PROGRESS NOTE    S:  MD to bedside for prolonged deceleration into the 60s with moderate BTBV. Patient recently with epidural within the half hour. Blood pressure normal during evaluation. Patient bolused with 500cc, patient transitioned to left decubitus position. Patient not on pitocin. Oxygen preparing to be administered however not given as fetal heart rate returned spontaneously.      O: Temp:  [98.1 °F (36.7 °C)] 98.1 °F (36.7 °C)  Pulse:  [] 88  Resp:  [18] 18  SpO2:  [96 %-99 %] 98 %  BP: (121-154)/(83-99) 142/93      FHT: 150 BPM/mod beat to beat variability/-accels/-decels Cat 2 (reassuring)  CTX: q 2-4 minutes,  SVE: 5/70/-3        ASSESSMENT:   32 y.o.  at 39w0d, IOL for IUGR at term    FHT reassuring    Active Hospital Problems    Diagnosis  POA    *IUGR (intrauterine growth restriction) affecting care of mother [O36.5990]  Yes      Resolved Hospital Problems    Diagnosis Date Resolved POA   No resolved problems to display.         PLAN:    Labor management  Continue Close Maternal/Fetal Monitoring.   Cytotec given at noon  Will begin pitocin 4 hours after cytotec administered  Recheck 1 hour    1500: Patient with prolonged deceleration. Return to baseline after basic resuscitation efforts. Plan for AROM next check and start pitocin    Bib Baltazar MD

## 2018-02-07 NOTE — PROGRESS NOTES
LABOR PROGRESS NOTE    S:  MD to bedside for cervical check. Epidural in place working well      O: Temp:  [98.1 °F (36.7 °C)-98.6 °F (37 °C)] 98.6 °F (37 °C)  Pulse:  [] 91  Resp:  [18] 18  SpO2:  [90 %-100 %] 98 %  BP: (121-154)/(75-99) 126/85      FHT: 150 BPM/mod beat to beat variability/-accels/-decels Cat 2 (reassuring)  CTX: q 2-4 minutes,  SVE: 570/-2        ASSESSMENT:   32 y.o.  at 39w0d, IOL for IUGR at term    FHT reassuring    Active Hospital Problems    Diagnosis  POA    *IUGR (intrauterine growth restriction) affecting care of mother [O36.5990]  Yes      Resolved Hospital Problems    Diagnosis Date Resolved POA   No resolved problems to display.         PLAN:    Labor management      1500: Patient with prolonged deceleration. Return to baseline after basic resuscitation efforts. Plan for AROM next check and start pitocin  1700: AROM. Cervix /70/-2. Start pitocin now      Bib Baltazar MD

## 2018-02-07 NOTE — NURSING
Cooks catheter placed with 40cc in each port, pt tolerating catheter well, will continue to monitor.

## 2018-02-07 NOTE — HOSPITAL COURSE
2018 - patient admitted from [facility] for IOL for IUGR (fetus in 1st percentile,  EFW 2,412 g). Started induction with cook balloon and PO cytotec, continued with pitocin and AROM. Mildly elevated BP noted - pre-eclampsia labs drawn, negative. Late decelerations throughout active labor, improved with resuscitation.  with no complications, 1st degree laceration.  2018 - PPD#1 s/p . Doing well this morning, meeting all postpartum goals (ambulating, pain well-controlled, tolerating reg diet without n/v, passing flatus, voiding spontaneously). No complaints this morning.  2018 - PPD#2 s/p . Doing well this morning, meeting all postpartum goals (ambulating, pain well-controlled, tolerating reg diet without n/v, passing flatus, voiding spontaneously). Stable for discharge home with 1 week blood pressure check and 6 week postpartum follow up.

## 2018-02-07 NOTE — ANESTHESIA PROCEDURE NOTES
Epidural    Patient location during procedure: OB   Reason for block: primary anesthetic   Diagnosis: IUP   Start time: 2/7/2018 2:00 PM  Timeout: 2/7/2018 2:00 PM  End time: 2/7/2018 2:15 PM  Staffing  Anesthesiologist: JAMI MISTRY  Resident/CRNA: WARD BAKER  Performed: resident/CRNA   Preanesthetic Checklist  Completed: patient identified, site marked, surgical consent, pre-op evaluation, timeout performed, IV checked, risks and benefits discussed, monitors and equipment checked, anesthesia consent given, hand hygiene performed and patient being monitored  Preparation  Patient position: sitting  Prep: ChloraPrep  Patient monitoring: ECG, Pulse Ox, continuous capnometry and Blood Pressure  Epidural  Skin Anesthetic: lidocaine 1%  Administration type: single shot  Approach: midline  Interspace: L3-4  Injection technique: BREONNA air  Needle and Epidural Catheter  Needle type: Tuohy   Needle gauge: 17  Needle insertion depth: 7 cm  Catheter type: springwound and multi-orifice  Catheter size: 19 G  Catheter at skin depth: 11 cm  Test dose: 3 mL of lidocaine 1.5% with Epi 1-to-200,000  Additional Documentation: incremental injection, negative aspiration for heme and CSF and no paresthesia on injection  Needle localization: anatomical landmarks  Medications:  Bolus administered: 10 mL of 0.75% bupivacaine  Epinephrine added: 2.5 mcg/mL (1/400,000)  Opioid administered: 20 mcg of   fentanyl  Assessment  Upper dermatomal levels - Left: T8  Right: T8  Ease of block: easy  Patient's tolerance of the procedure: comfortable throughout block  Post dural Puncture Headache?: No

## 2018-02-07 NOTE — NURSING
Another 20cc was added into each port of the Cooks catheter for a total of 60cc in each port, pt has no complaints, will continue to monitor.

## 2018-02-08 PROBLEM — O13.3 PREGNANCY-INDUCED HYPERTENSION IN THIRD TRIMESTER: Status: ACTIVE | Noted: 2018-02-08

## 2018-02-08 LAB
BASOPHILS # BLD AUTO: 0.02 K/UL
BASOPHILS NFR BLD: 0.1 %
DIFFERENTIAL METHOD: ABNORMAL
EOSINOPHIL # BLD AUTO: 0.1 K/UL
EOSINOPHIL NFR BLD: 0.8 %
ERYTHROCYTE [DISTWIDTH] IN BLOOD BY AUTOMATED COUNT: 14.1 %
HCT VFR BLD AUTO: 37.7 %
HGB BLD-MCNC: 12.3 G/DL
LYMPHOCYTES # BLD AUTO: 1.6 K/UL
LYMPHOCYTES NFR BLD: 11.7 %
MCH RBC QN AUTO: 29.7 PG
MCHC RBC AUTO-ENTMCNC: 32.6 G/DL
MCV RBC AUTO: 91 FL
MONOCYTES # BLD AUTO: 1.1 K/UL
MONOCYTES NFR BLD: 7.9 %
NEUTROPHILS # BLD AUTO: 10.8 K/UL
NEUTROPHILS NFR BLD: 79.1 %
PLATELET # BLD AUTO: 190 K/UL
PMV BLD AUTO: 11.7 FL
POCT GLUCOSE: 80 MG/DL (ref 70–110)
RBC # BLD AUTO: 4.14 M/UL
WBC # BLD AUTO: 13.61 K/UL

## 2018-02-08 PROCEDURE — 36415 COLL VENOUS BLD VENIPUNCTURE: CPT

## 2018-02-08 PROCEDURE — 25000003 PHARM REV CODE 250: Performed by: STUDENT IN AN ORGANIZED HEALTH CARE EDUCATION/TRAINING PROGRAM

## 2018-02-08 PROCEDURE — 11000001 HC ACUTE MED/SURG PRIVATE ROOM

## 2018-02-08 PROCEDURE — 85025 COMPLETE CBC W/AUTO DIFF WBC: CPT

## 2018-02-08 RX ORDER — IBUPROFEN 800 MG/1
800 TABLET ORAL EVERY 8 HOURS
Qty: 60 TABLET | Refills: 0 | Status: SHIPPED | OUTPATIENT
Start: 2018-02-08 | End: 2018-09-24

## 2018-02-08 RX ADMIN — IBUPROFEN 800 MG: 400 TABLET, FILM COATED ORAL at 01:02

## 2018-02-08 RX ADMIN — IBUPROFEN 800 MG: 400 TABLET, FILM COATED ORAL at 08:02

## 2018-02-08 RX ADMIN — HYDROCODONE BITARTRATE AND ACETAMINOPHEN 1 TABLET: 5; 325 TABLET ORAL at 08:02

## 2018-02-08 RX ADMIN — DOCUSATE SODIUM 200 MG: 100 CAPSULE, LIQUID FILLED ORAL at 08:02

## 2018-02-08 RX ADMIN — IBUPROFEN 800 MG: 400 TABLET, FILM COATED ORAL at 06:02

## 2018-02-08 NOTE — PROGRESS NOTES
[facility]  Obstetrics  Postpartum Progress Note    Patient Name: Paula Armendariz  MRN: [id number]  Admission Date: 2018  Hospital Length of Stay: 1 days  Attending Physician: Lupe Reed MD  Primary Care Provider: Primary Doctor No    Subjective:     Principal Problem:IUGR (intrauterine growth restriction) affecting care of mother    Hospital course: 2018 - patient admitted from [facility] for IOL for IUGR (fetus in 1st percentile,  EFW 2,412 g). Started induction with cook balloon and PO cytotec, continued with pitocin and AROM. Pre-eclampsia labs drawn, negative. Late decelerations throughout active labor, improved with resuscitation.  with no complications, 1st degree laceration.  2018 - PPD#1 s/p . Doing well this morning, meeting all postpartum goals (ambulating, pain well-controlled, tolerating reg diet without n/v, passing flatus, voiding spontaneously). No complaints this morning.    Interval History: PPD#1 s/p     She is doing well this morning. She is tolerating a regular diet without nausea or vomiting. She is voiding spontaneously. She is ambulating. She has passed flatus, and has not a BM. Vaginal bleeding is mild. She denies fever or chills. Abdominal pain is mild and controlled with oral medications. She is breastfeeding.    Objective:     Vital Signs (Most Recent):  Temp: 97.7 °F (36.5 °C) (18 0400)  Pulse: 102 (18 0400)  Resp: 18 (18 0400)  BP: 132/70 (180)  SpO2: 100 % (18) Vital Signs (24h Range):  Temp:  [97.7 °F (36.5 °C)-99 °F (37.2 °C)] 97.7 °F (36.5 °C)  Pulse:  [] 102  Resp:  [18] 18  SpO2:  [90 %-100 %] 100 %  BP: (113-154)/(63-99) 132/70     Weight: 95.3 kg (210 lb)  Body mass index is 31.93 kg/m².      Intake/Output Summary (Last 24 hours) at 18 0658  Last data filed at 18 2330   Gross per 24 hour   Intake                0 ml   Output             1400 ml   Net            -1400 ml        Significant Labs:  Lab Results   Component Value Date    GROUPTRH O NEG 2018    HEPBSAG Negative 2017    STREPBCULT No Group B Streptococcus isolated 2018    STREPBCULT No Group B Streptococcus isolated 2018       Recent Labs  Lab 18  0537   HGB 12.3   HCT 37.7       I have personallly reviewed all pertinent lab results from the last 24 hours.     Physical Exam   Constitutional: She is oriented to person, place, and time. She appears well-developed and well-nourished. No distress.   HENT:   Head: Normocephalic and atraumatic.   Eyes: EOM are normal.   Neck: Normal range of motion.   Cardiovascular: Normal rate.    Pulmonary/Chest: Effort normal. No respiratory distress.   Abdominal: Soft. She exhibits no mass. There is no tenderness. There is no guarding.   Fundus firm below umbilicus   Neurological: She is alert and oriented to person, place, and time.   Skin: Skin is warm and dry. She is not diaphoretic.   Psychiatric: She has a normal mood and affect. Her behavior is normal. Judgment and thought content normal.   Vitals reviewed.        Assessment/Plan:     32 y.o. female  for:    * IUGR (intrauterine growth restriction) affecting care of mother    - s/p         Pregnancy-induced hypertension in third trimester    - BP: (113-154)/(63-99) 132/70  - mildly elevated BP noted antepartum and intrapartum-  - PCR 0.19  - CMP, CBC wnl  - denies pre-eclampsia ROS         (spontaneous vaginal delivery)    - Postpartum care:  - Patient doing well. Continue routine management and advances.  - Continue PO pain meds. Pain well controlled.  - Encourage ambulation.   - Heme: Pre Delivery h/h  --> Post Delivery h/h    - Contraception - barrier  - Lactation - The patient is breastfeeding. Lactation nurse following along PRN  - Rh Status - pos            Disposition: As patient meets milestones, will plan to discharge PPD#1-2.    Sushma K Reddy, MD  Obstetrics  [facility]

## 2018-02-08 NOTE — L&D DELIVERY NOTE
[facility]  Vaginal Delivery   Operative Note    SUMMARY     Normal spontaneous vaginal delivery of live infant, skin to skin was unable to be performed due to NICU team present for evaluation 2/2 IUGR and category 2 tracing..  Infant delivered position OA over perineum.  Nuchal cord: Yes, cord reduced following delivery.    Manual delivery of placenta and IV pitocin given noting good uterine tone.  1st degree perineal laceration and right labial laceration/periurethral noted noted. The first degree was repaired in the usual fashion with chromic suture. The periurethral laceration was repaired with interrupted sutures using 3-0 chromic.  Patient tolerated delivery well. Sponge needle and lap counted correctly x2.    Indications: IUGR (intrauterine growth restriction) affecting care of mother  Pregnancy complicated by:   Patient Active Problem List   Diagnosis    PCOS (polycystic ovarian syndrome)    Pregnancy, supervision, normal, first/breast/condom/flu    Marginal insertion of umbilical cord affecting management of mother in third trimester     (spontaneous vaginal delivery)    IUGR (intrauterine growth restriction) affecting care of mother     Admitting GA: 39w0d    Delivery Information for  Ishmael Armendariz    Birth information:  YOB: 2017   Time of birth: 8:43 PM   Sex: female   Head Delivery Date/Time: 2018  8:43 PM   Delivery type: Vaginal, Spontaneous Delivery   Gestational Age: 39w0d    Delivery Providers    Delivering clinician:  Jovi Mccallum MD   Other personnel:   Provider Role   Jessica Whelan MD Resident   Sosa Dubon RN Delivery Nurse   Abdi Gonzalez RN Delivery Nurse   Yesenia Stevenson, RN Charge Nurse   Elly Carlson, RN Registered Nurse               [place] Measurements    Weight:  2230 g Length:  45.7 cm   Head circum.:  30.5 cm Chest circum.:  27.9 cm           Assessment    Living status:  Living  Apgars:     1 Minute:   5  Minute:   10 Minute:   15 Minute:   20 Minute:     Skin Color:   0  1       Heart Rate:   2  2       Reflex Irritability:   2  2       Muscle Tone:   2  2       Respiratory Effort:   2  2       Total:   8  9               Apgars Assigned By:  NICU         Assisted Delivery Details:    Forceps attempted?:  No  Vacuum extractor attempted?:  No         Shoulder Dystocia    Shoulder dystocia present?:  No           Presentation and Position    Presentation:   Vertex   Position:       Occiput    Anterior            Interventions/Resuscitation    Method:  NICU Attended       Cord    Vessels:  Unknown  Complications:  None, Nuchal  Nuchal Intervention:  reduced  Nuchal Cord Description:  loose nuchal cord  Number of Loops:  1  Delayed Cord Clamping?:  No  Cord Blood Disposition:  Sent with Baby  Gases Sent?:  Yes  Stem Cell Collection (by MD):  No       Placenta    Date and time:  2018  8:46 PM  Removal:  Spontaneous  Appearance:  Intact  Placenta disposition:  pathology           Labor Events:       labor: No     Labor Onset Date/Time:         Dilation Complete Date/Time:         Start Pushing Date/Time:       Rupture Date/Time: 18  1649         Rupture type:           Fluid Amount:        Fluid Color:        Fluid Odor:        Membrane Status (PeriCalm): ARM (Artificial Rupture)      Rupture Date/Time (PeriCalm): 2018 17:05:00      Fluid Amount (PeriCalm): Moderate      Fluid Color (PeriCalm): Clear       steroids: None     Antibiotics given for GBS: No     Induction: balloon dilation (Gtz);misoprostol     Indications for induction:  Hypertension;Fetal Abnormality     Augmentation: oxytocin     Indications for augmentation: Hypertension     Labor complications: None     Additional complications:          Cervical ripening:                     Delivery:      Episiotomy: None     Indication for Episiotomy:       Perineal Lacerations: 1st Repaired:  Yes   Periurethral Laceration: right  Repaired: Yes   Labial Laceration: right Repaired: Yes   Sulcus Laceration: none Repaired:     Vaginal Laceration: No Repaired:     Cervical Laceration: No Repaired:     Repair suture:       Repair # of packets: 3     Vaginal delivery QBL (mL):        QBL (mL): 0     Combined Blood Loss (mL): 0     Vaginal Sweep Performed: Yes     Surgicount Correct: Yes       Other providers:            Details (if applicable):  Trial of Labor      Categorization:      Priority:     Indications for :     Incision Type:       Additional  information:  Forceps:    Vacuum:    Breech:    Observed anomalies    Other (Comments):

## 2018-02-08 NOTE — DISCHARGE INSTRUCTIONS
Breastfeeding Discharge Instructions       Feed the baby at the earliest sign of hunger or comfort  o Hands to mouth, sucking motions  o Rooting or searching for something to suck on  o Dont wait for crying - it is a sign of distress     The feedings may be 8-12 times per 24hrs and will not follow a schedule   Avoid pacifiers and bottles for the first 4 weeks   Alternate the breast you start the feeding with, or start with the breast that feels the fullest   Switch breasts when the baby takes himself off the breast or falls asleep   Keep offering breasts until the baby looks full, no longer gives hunger signs, and stays asleep when placed on his back in the crib   If the baby is sleepy and wont wake for a feeding, put the baby skin-to-skin dressed in a diaper against the mothers bare chest   Sleep near your baby   The baby should be positioned and latched on to the breast correctly  o Chest-to-chest, chin in the breast  o Babys lips are flipped outward  o Babys mouth is stretched open wide like a shout  o Babys sucking should feel like tugging to the mother  - The baby should be drinking at the breast:  o You should hear swallowing or gulping throughout the feeding  o You should see milk on the babys lips when he comes off the breast  o Your breasts should be softer when the baby is finished feeding  o The baby should look relaxed at the end of feedings  o After the 4th day and your milk is in:  o The babys poop should turn bright yellow and be loose, watery, and seedy  o The baby should have at least 3-4 poops the size of the palm of your hand per day  o The baby should have at least 5-6 wet diapers per day  o The urine should be light yellow in color  You should drink when you are thirsty and eat a healthy diet when you are    hungry.     Take naps to get the rest you need.   Take medications and/or drink alcohol only with permission of your obstetrician    or the babys pediatrician.  You can  also call the [facility] Risk [facility],   ([phone]), Monday-Friday, 8am-5pm Central time, to get the most   up-to-date evidence-based information on the use of medications during   pregnancy and breastfeeding.      The baby should be examined by a pediatrician at 3-5 days of age.  Once your   milk comes in, the baby should be gaining at least ½ - 1oz each day and should be back to birthweight no later than 10-14 days of age.          Community Resources    [facility] Breastfeeding Warmline: [phone]   Local [facility] clinics: provide incentives and breastpumps to eligible mothers  [employer] ([employer]):  mother-to-mother support group website        www.[employer].WhoKnows  Local [employer]he [employer] mother-to-mother support groups:        www.TimePoints        [employer]eche League [facility]   Dr. Andrew Shea website for latch videos and general information:        www.breastfeedinginc.ca  [facility] is a call center that provides information about the safety of taking medications while breastfeeding.  Call [phone], M-F, 8am-5pm, [place].  International Lactation Consultant Association provides resources for assistance:        www.ilca.org  [facility] provides informationand resources for parents  and the community    http://[facility] Healthastfeeding.org     Gricelda Person is a mom-to-mom support group:                             www.[employer].com//breastfeedng-support/  [employer] for Healthy Babies:  0-472-869-BABY(8894)  [employer]: a breastfeeding support group for women of color, [phone]

## 2018-02-08 NOTE — SUBJECTIVE & OBJECTIVE
Hospital course: 2018 - patient admitted from [facility] for IOL for IUGR (fetus in 1st percentile,  EFW 2,412 g). Started induction with cook balloon and PO cytotec, continued with pitocin and AROM. Pre-eclampsia labs drawn, negative. Late decelerations throughout active labor, improved with resuscitation.  with no complications, 1st degree laceration.  2018 - PPD#1 s/p . Doing well this morning, meeting all postpartum goals (ambulating, pain well-controlled, tolerating reg diet without n/v, passing flatus, voiding spontaneously). No complaints this morning.    Interval History: PPD#1 s/p     She is doing well this morning. She is tolerating a regular diet without nausea or vomiting. She is voiding spontaneously. She is ambulating. She has passed flatus, and has not a BM. Vaginal bleeding is mild. She denies fever or chills. Abdominal pain is mild and controlled with oral medications. She is breastfeeding.    Objective:     Vital Signs (Most Recent):  Temp: 97.7 °F (36.5 °C) (18 0400)  Pulse: 102 (18)  Resp: 18 (18)  BP: 132/70 (18)  SpO2: 100 % (18) Vital Signs (24h Range):  Temp:  [97.7 °F (36.5 °C)-99 °F (37.2 °C)] 97.7 °F (36.5 °C)  Pulse:  [] 102  Resp:  [18] 18  SpO2:  [90 %-100 %] 100 %  BP: (113-154)/(63-99) 132/70     Weight: 95.3 kg (210 lb)  Body mass index is 31.93 kg/m².      Intake/Output Summary (Last 24 hours) at 18 0658  Last data filed at 18 2330   Gross per 24 hour   Intake                0 ml   Output             1400 ml   Net            -1400 ml       Significant Labs:  Lab Results   Component Value Date    GROUPTRH O NEG 2018    HEPBSAG Negative 2017    STREPBCULT No Group B Streptococcus isolated 2018    STREPBCULT No Group B Streptococcus isolated 2018       Recent Labs  Lab 18  0537   HGB 12.3   HCT 37.7       I have personallly reviewed all pertinent lab results from the last 24  hours.     Physical Exam   Constitutional: She is oriented to person, place, and time. She appears well-developed and well-nourished. No distress.   HENT:   Head: Normocephalic and atraumatic.   Eyes: EOM are normal.   Neck: Normal range of motion.   Cardiovascular: Normal rate.    Pulmonary/Chest: Effort normal. No respiratory distress.   Abdominal: Soft. She exhibits no mass. There is no tenderness. There is no guarding.   Fundus firm below umbilicus   Neurological: She is alert and oriented to person, place, and time.   Skin: Skin is warm and dry. She is not diaphoretic.   Psychiatric: She has a normal mood and affect. Her behavior is normal. Judgment and thought content normal.   Vitals reviewed.

## 2018-02-08 NOTE — PROGRESS NOTES
LABOR PROGRESS NOTE    S:  MD to bedside for cervical check. Epidural in place working well. Reports feeling pressure    O: Temp:  [98.1 °F (36.7 °C)-98.8 °F (37.1 °C)] 98.8 °F (37.1 °C)  Pulse:  [] 95  Resp:  [18] 18  SpO2:  [90 %-100 %] 100 %  BP: (113-154)/(67-99) 113/67      FHT: 160 BPM/minimal to mod beat to beat variability/-accels/occasional variable decels Cat 2 (reassuring)  CTX: q 2-4 minutes  SVE: comp/+2        ASSESSMENT:   32 y.o.  at 39w0d, IOL for IUGR at term w/ marginal insertion of umbilical cord, now completely dilated    FHT reassuring    Active Hospital Problems    Diagnosis  POA    *IUGR (intrauterine growth restriction) affecting care of mother [O36.5990]  Yes      Resolved Hospital Problems    Diagnosis Date Resolved POA   No resolved problems to display.         PLAN:    Labor management      1500: Patient with prolonged deceleration. Return to baseline after basic resuscitation efforts. Plan for AROM next check and start pitocin  1700: AROM. Cervix 5/70/-2. Start pitocin now  1740: Called out for pressure, 6/80/-2, late decelerations with return to baseline and moderate BTBV, pitocin at 6 mU/min  1800: To bedside for evaluation 2/2 continued late decels, cervix still 6/80/-2, pitocin paused, will restart at 2 mU/min after 20 minutes cat 1  : comp/+2, will set up to push, NICU called to attend delivery, staff aware      Jessica Whelan MD

## 2018-02-08 NOTE — PLAN OF CARE
Problem: Patient Care Overview  Goal: Plan of Care Review  Outcome: Ongoing (interventions implemented as appropriate)  Pt denies pain, n/v, h /a, dizziness, SOB, visual disturbances, right epigastric pain, VSS, pt reports +FM, bed at lowest position, call light within reach, side rails up x2, spouse to bedside, pt has no further questions, comments, or concerns at this time, will continue to monitor.

## 2018-02-08 NOTE — PROGRESS NOTES
LABOR PROGRESS NOTE    S:  MD to bedside for cervical check. Epidural in place working well. Reports feeling pressure    O: Temp:  [98.1 °F (36.7 °C)-98.6 °F (37 °C)] 98.2 °F (36.8 °C)  Pulse:  [] 95  Resp:  [18] 18  SpO2:  [90 %-100 %] 100 %  BP: (121-154)/(72-99) 133/88      FHT: 150 BPM/minimal to mod beat to beat variability/-accels/occasional late and early decels Cat 2 (reassuring)  CTX: q 2-4 minutes  SVE: 6/70/-2        ASSESSMENT:   32 y.o.  at 39w0d, IOL for IUGR at term w/ marginal insertion of umbilical cord    FHT reassuring    Active Hospital Problems    Diagnosis  POA    *IUGR (intrauterine growth restriction) affecting care of mother [O36.5990]  Yes      Resolved Hospital Problems    Diagnosis Date Resolved POA   No resolved problems to display.         PLAN:    Labor management      1500: Patient with prolonged deceleration. Return to baseline after basic resuscitation efforts. Plan for AROM next check and start pitocin  1700: AROM. Cervix 5/70/-2. Start pitocin now  1740: Called out for pressure, 6/80/-2, late decelerations with return to baseline and moderate BTBV, pitocin at 6 mU/min  1800: To bedside for evaluation 2/2 continued late decels, cervix still 6/80/-2, pitocin paused, will restart at 2 mU/min after 20 minutes cat 1      Jessica Whelan MD

## 2018-02-08 NOTE — PLAN OF CARE
Problem: Patient Care Overview  Goal: Plan of Care Review  Outcome: Ongoing (interventions implemented as appropriate)  Lactation note:  Lactation consultant's first visit with patient. Reviewed the breastfeeding guide and encouraged the patient to feed infant 8 or more times in 24 hours on cue until content. Infant to be woken up to feed at least every 3 hours due to small size. Offered to assist with breastfeeding; infant latched but only took a few sucks. Showed mom how to perform hand expression and infant fed colostrum by spoon. Left infant skin to skin and left LC phone number on board for patient to call for assistance.

## 2018-02-08 NOTE — LACTATION NOTE
"   02/08/18 1210   Maternal Infant Assessment   Breast Shape Left:;round   Breast Density Left:;soft   Areola Left:;elastic   Nipple(s) Left:;everted   LATCH Score   Latch 1-->repeated attempts, holds nipple in mouth, stimulate to suck   Audible Swallowing 0-->none   Type Of Nipple 2-->everted (after stimulation)   Comfort (Breast/Nipple) 2-->soft/nontender   Hold (Positioning) 0-->full assist (staff holds infant at breast)   Score (less than 7 for 2/more consecutive times, consult Lactation Consultant) 5       Number Scale   Presence of Pain denies   Location - Side Left   Location nipple(s)   Maternal Infant Feeding   Infant Positioning clutch/"football"   Time Spent (min) 15-30 min   Latch Assistance yes   Breastfeeding Education adequate infant intake;adequate milk volume;importance of skin-to-skin contact;increasing milk supply;milk expression, hand   Infant First Feeding   Skin-to-Skin Contact Maintained   Feeding Infant   Feeding Tolerance/Success refusal to suck;sleepy   Lactation Referrals   Lactation Consult Breastfeeding assessment;Initial assessment   Lactation Interventions   Attachment Promotion breastfeeding assistance provided;counseling provided;skin-to-skin contact encouraged   Breastfeeding Assistance assisted with positioning;infant latch-on verified;infant stimulated to wakeful state;milk expression/pumping   Maternal Breastfeeding Support diary/feeding log utilized;encouragement offered     "

## 2018-02-08 NOTE — ASSESSMENT & PLAN NOTE
- BP: (113-154)/(63-99) 132/70  - mildly elevated BP noted antepartum and intrapartum-  - PCR 0.19  - CMP, CBC wnl  - denies pre-eclampsia ROS

## 2018-02-08 NOTE — ANESTHESIA POSTPROCEDURE EVALUATION
"Anesthesia Post Evaluation    Patient: Paula Armendariz    Procedure(s) Performed: * No procedures listed *    Final Anesthesia Type: epidural  Patient location during evaluation: labor & delivery  Patient participation: Yes- Able to Participate  Level of consciousness: awake and alert and awake  Post-procedure vital signs: reviewed and stable  Airway patency: patent  PONV status at discharge: No PONV  Anesthetic complications: no      Cardiovascular status: blood pressure returned to baseline  Respiratory status: spontaneous ventilation, unassisted and room air  Hydration status: euvolemic  Follow-up not needed.        Visit Vitals  /82   Pulse 98   Temp 36.7 °C (98 °F) (Oral)   Resp 18   Ht 5' 8" (1.727 m)   Wt 95.3 kg (210 lb)   LMP 05/10/2017   SpO2 100%   Breastfeeding? Yes   BMI 31.93 kg/m²       Pain/Jeb Score: Pain Rating Prior to Med Admin: 1 (2/8/2018  6:26 AM)  Pain Rating Post Med Admin: 0 (2/8/2018  7:00 AM)      "

## 2018-02-08 NOTE — ASSESSMENT & PLAN NOTE
- Postpartum care:  - Patient doing well. Continue routine management and advances.  - Continue PO pain meds. Pain well controlled.  - Encourage ambulation.   - Heme: Pre Delivery h/h 13/39 --> Post Delivery h/h 12/37   - Contraception - barrier  - Lactation - The patient is breastfeeding. Lactation nurse following along PRN  - Rh Status - pos

## 2018-02-09 VITALS
WEIGHT: 210 LBS | SYSTOLIC BLOOD PRESSURE: 127 MMHG | OXYGEN SATURATION: 98 % | HEIGHT: 68 IN | DIASTOLIC BLOOD PRESSURE: 78 MMHG | BODY MASS INDEX: 31.83 KG/M2 | HEART RATE: 92 BPM | TEMPERATURE: 98 F | RESPIRATION RATE: 18 BRPM

## 2018-02-09 PROBLEM — O36.5990 IUGR (INTRAUTERINE GROWTH RESTRICTION) AFFECTING CARE OF MOTHER: Status: RESOLVED | Noted: 2018-02-07 | Resolved: 2018-02-09

## 2018-02-09 PROCEDURE — 25000003 PHARM REV CODE 250: Performed by: STUDENT IN AN ORGANIZED HEALTH CARE EDUCATION/TRAINING PROGRAM

## 2018-02-09 RX ADMIN — DOCUSATE SODIUM 200 MG: 100 CAPSULE, LIQUID FILLED ORAL at 01:02

## 2018-02-09 RX ADMIN — IBUPROFEN 800 MG: 400 TABLET, FILM COATED ORAL at 06:02

## 2018-02-09 NOTE — PROGRESS NOTES
[facility]  Obstetrics  Postpartum Progress Note    Patient Name: Paula Armendariz  MRN: [id number]  Admission Date: 2018  Hospital Length of Stay: 2 days  Attending Physician: Lupe Reed MD  Primary Care Provider: Primary Doctor No    Subjective:     Principal Problem:IUGR (intrauterine growth restriction) affecting care of mother    Hospital course: 2018 - patient admitted from [facility] for IOL for IUGR (fetus in 1st percentile,  EFW 2,412 g). Started induction with cook balloon and PO cytotec, continued with pitocin and AROM. Mildly elevated BP noted - pre-eclampsia labs drawn, negative. Late decelerations throughout active labor, improved with resuscitation.  with no complications, 1st degree laceration.  2018 - PPD#1 s/p . Doing well this morning, meeting all postpartum goals (ambulating, pain well-controlled, tolerating reg diet without n/v, passing flatus, voiding spontaneously). No complaints this morning.  2018 - PPD#2 s/p . Doing well this morning, meeting all postpartum goals (ambulating, pain well-controlled, tolerating reg diet without n/v, passing flatus, voiding spontaneously). Stable for discharge home with 1 week blood pressure check and 6 week postpartum follow up.    Interval History: PPD#2 s/p     She is doing well this morning. She is tolerating a regular diet without nausea or vomiting. She is voiding spontaneously. She is ambulating. She has passed flatus, and has not a BM. Vaginal bleeding is mild. She denies fever or chills. Abdominal pain is mild and controlled with oral medications. She is breastfeeding.    Objective:     Vital Signs (Most Recent):  Temp: 98.4 °F (36.9 °C) (18 0445)  Pulse: 94 (18)  Resp: 18 (18)  BP: 134/85 (18)  SpO2: 99 % (18) Vital Signs (24h Range):  Temp:  [98 °F (36.7 °C)-98.5 °F (36.9 °C)] 98.4 °F (36.9 °C)  Pulse:  [] 94  Resp:  [18] 18  SpO2:  [96 %-99  %] 99 %  BP: (124-144)/(64-85) 134/85     Weight: 95.3 kg (210 lb)  Body mass index is 31.93 kg/m².    No intake or output data in the 24 hours ending 18 0722    Significant Labs:  Lab Results   Component Value Date    GROUPTRH O NEG 2018    HEPBSAG Negative 2017    STREPBCULT No Group B Streptococcus isolated 2018    STREPBCULT No Group B Streptococcus isolated 2018       Recent Labs  Lab 18  0537   HGB 12.3   HCT 37.7       I have personallly reviewed all pertinent lab results from the last 24 hours.     Physical Exam   Constitutional: She is oriented to person, place, and time. She appears well-developed and well-nourished. No distress.   HENT:   Head: Normocephalic and atraumatic.   Eyes: EOM are normal.   Neck: Normal range of motion.   Cardiovascular: Normal rate.    Pulmonary/Chest: Effort normal. No respiratory distress.   Abdominal: Soft. She exhibits no mass. There is no tenderness. There is no guarding.   Fundus firm below umbilicus   Neurological: She is alert and oriented to person, place, and time.   Skin: Skin is warm and dry. She is not diaphoretic.   Psychiatric: She has a normal mood and affect. Her behavior is normal. Judgment and thought content normal.   Vitals reviewed.        Assessment/Plan:     32 y.o. female  for:    * IUGR (intrauterine growth restriction) affecting care of mother    - s/p         Pregnancy-induced hypertension in third trimester    - BP: (124-144)/(64-85) 134/85  - mildly elevated BP noted antepartum and intrapartum  - PCR 0.19  - CMP, CBC wnl  - denies pre-eclampsia ROS  - stable for discharge with 1 week BP check         (spontaneous vaginal delivery)    - Postpartum care:  - Patient doing well. Continue routine management and advances.  - Continue PO pain meds. Pain well controlled.  - Encourage ambulation.   - Heme: Pre Delivery h/h  --> Post Delivery h/h    - Contraception - barrier  - Lactation - The patient  is breastfeeding. Lactation nurse following along PRN  - Rh Status - pos            Disposition: As patient is meeting milestones, will plan to discharge PPD#2 (today).    Sushma K Reddy, MD  Obstetrics  [facility]    Doing well, no questions,no problems.  I have reviewed the resident's note, evaluated the patient and agree with the diagnosis and management plan

## 2018-02-09 NOTE — LACTATION NOTE
"   02/09/18 1345   Maternal Infant Assessment   Breast Shape Bilateral:;round   Breast Density Bilateral:;filling   Areola Bilateral:;elastic   Nipple(s) Bilateral:;everted   Nipple Symptoms left:;tender   Infant Assessment   Sucking Reflex present   Rooting Reflex present   Swallow Reflex present   LATCH Score   Latch 1-->repeated attempts, holds nipple in mouth, stimulate to suck   Audible Swallowing 2-->spontaneous and intermittent (24 hrs old)   Type Of Nipple 2-->everted (after stimulation)   Comfort (Breast/Nipple) 1-->filling, red/small blisters/bruises, mild/mod discomfort   Hold (Positioning) 2-->no assist from staff, mother able to position/hold infant   Score (less than 7 for 2/more consecutive times, consult Lactation Consultant) 8       Number Scale   Presence of Pain complains of pain/discomfort   Location - Side Left   Location nipple(s)   Pain Rating: Rest 3   Pain Frequency intermittent   Pain Quality soreness   Pain Management Interventions other (see comments)  (use lanolin; work on deeper latch)   Maternal Infant Feeding   Infant Positioning clutch/"football";cross-cradle   Signs of Milk Transfer audible swallow;infant jaw motion present   Time Spent (min) 15-30 min   Feeding Infant   Feeding Tolerance/Success sleepy   Skin-to-Skin Contact During Feeding yes   Lactation Referrals   Lactation Consult Breastfeeding assessment   Lactation Interventions   Attachment Promotion breastfeeding assistance provided;counseling provided   Breastfeeding Assistance assisted with positioning;feeding cue recognition promoted;infant latch-on verified;infant stimulated to wakeful state;infant suck/swallow verified     "

## 2018-02-09 NOTE — DISCHARGE SUMMARY
[facility]  Obstetrics  Discharge Summary      Patient Name: Paula Armendraiz  MRN: [id number]  Admission Date: 2018  Hospital Length of Stay: 2 days  Discharge Date and Time:  2018 7:37 AM  Attending Physician: Lupe Reed MD   Discharging Provider: Sushma K Reddy, MD  Primary Care Provider: Primary Doctor No    HPI: Paula Armendariz is a 32 y.o. [id number] at 39w0d presents from [facility] for IOL due to ultrasound showing IUGR with fetus in the 1st percentile. She has no complaints and denies contractions, LOF, or vaginal bleeding. She notes good fetal movement.   This IUP is complicated by IUGR, gHTN, marginal cord insertion, and h/o PCOS.    * No surgery found *     Hospital Course:   2018 - patient admitted from [facility] for IOL for IUGR (fetus in 1st percentile,  EFW 2,412 g). Started induction with cook balloon and PO cytotec, continued with pitocin and AROM. Mildly elevated BP noted - pre-eclampsia labs drawn, negative. Late decelerations throughout active labor, improved with resuscitation.  with no complications, 1st degree laceration.  2018 - PPD#1 s/p . Doing well this morning, meeting all postpartum goals (ambulating, pain well-controlled, tolerating reg diet without n/v, passing flatus, voiding spontaneously). No complaints this morning.  2018 - PPD#2 s/p . Doing well this morning, meeting all postpartum goals (ambulating, pain well-controlled, tolerating reg diet without n/v, passing flatus, voiding spontaneously). Stable for discharge home with 1 week blood pressure check and 6 week postpartum follow up.    Consults         Status Ordering Provider     Consult to Lactation  Use PRN     Provider:  (Not yet assigned)    Acknowledged DARIUS ANAYA          Final Active Diagnoses:    Diagnosis Date Noted POA    Pregnancy-induced hypertension in third trimester [O13.3] 2018 Unknown     (spontaneous vaginal delivery) [O80] 2018 Not  Applicable      Problems Resolved During this Admission:    Diagnosis Date Noted Date Resolved POA    PRINCIPAL PROBLEM:  IUGR (intrauterine growth restriction) affecting care of mother [O36.5990] 02/07/2018 02/09/2018 Yes    IUGR (intrauterine growth restriction) affecting care of mother [O36.5990] 02/07/2018 02/07/2018 Yes        Labs:   CBC   Recent Labs  Lab 02/07/18  1158 02/08/18  0537   WBC 9.24 13.61*   HGB 13.1 12.3   HCT 39.6 37.7    190       Feeding Method: breast    Immunizations     Date Immunization Status Dose Route/Site Given by    02/07/18 2218 MMR Incomplete 0.5 mL Subcutaneous/Left deltoid     02/07/18 2218 Tdap Incomplete 0.5 mL Intramuscular/Left deltoid     02/07/18 2127 Rho (D) Immune Globulin - IM Incomplete 300 mcg Intramuscular/     02/08/18 0430 Rho (D) Immune Globulin - IM Incomplete 300 mcg Intramuscular/           Delivery:    Episiotomy: None   Lacerations: 1st   Repair suture:     Repair # of packets: 3   Blood loss (ml): 188     Birth information:  YOB: 2017   Time of birth: 8:43 PM   Sex: female   Delivery type: Vaginal, Spontaneous Delivery   Gestational Age: 39w0d    Delivery Clinician:      Other providers:       Additional  information:  Forceps:    Vacuum:    Breech:    Observed anomalies      Living?:           APGARS  One minute Five minutes Ten minutes   Skin color:         Heart rate:         Grimace:         Muscle tone:         Breathing:         Totals: 8  9        Placenta: Delivered:       appearance    Pending Diagnostic Studies:     None          Discharged Condition: good    Disposition:     Follow Up:  Follow-up Information     [facility] - [facility] In 6 weeks.    Specialty:  Obstetrics and Gynecology  Why:  For post-partum visit  Contact information:  [address] [facility] [address] [phone]           [facility] - OB/ GYN. Schedule an appointment as soon as possible for a visit in 1 week.    Specialty:   Obstetrics and Gynecology  Why:  BP check  Contact information:  [address] [address] [phone]               Patient Instructions:   No discharge procedures on file.  Medications:  Current Discharge Medication List      START taking these medications    Details   ibuprofen (ADVIL,MOTRIN) 800 MG tablet Take 1 tablet (800 mg total) by mouth every 8 (eight) hours.  Qty: 60 tablet, Refills: 0         CONTINUE these medications which have NOT CHANGED    Details   butalbital-acetaminophen-caff -40 mg Cap TAKE 1 OR 2 CAPSULES BY MOUTH AS DIRECTED.  Qty: 30 capsule, Refills: 12      PRENATAL VIT/IRON FUM/FOLIC AC (PRENATAL 1+1 ORAL) Take by mouth.         STOP taking these medications       pseudoephedrine (SUDAFED) 120 mg 12 hr tablet Comments:   Reason for Stopping:               Sushma K Reddy, MD  Obstetrics  [facility]    Doing well, no complaints, ready for D/C. RhoGam give 2/6/18

## 2018-02-09 NOTE — ASSESSMENT & PLAN NOTE
- BP: (124-144)/(64-85) 134/85  - mildly elevated BP noted antepartum and intrapartum  - PCR 0.19  - CMP, CBC wnl  - denies pre-eclampsia ROS  - stable for discharge with 1 week BP check

## 2018-02-09 NOTE — PLAN OF CARE
Problem: Patient Care Overview  Goal: Plan of Care Review  Outcome: Ongoing (interventions implemented as appropriate)  Lactation note:  Reviewed lactation discharge teaching with patient using the breastfeeding guide. The patient was able to latch her infant to the breast independently and infant was nursing effectively. Offered assistance with next feeding. Encouraged nursing infant 8 or more times in 24 hours on cue until content, waking to feed at least every 3 hours. We discussed prevention and treatment of sore nipples and breast engorgement. The patient has lanolin to use as needed after nursing. The patient was taught how to perform hand expression and she has a breast pump. Patient to use breast pump as needed after nursing to provide breast milk for her baby due to small for gestational age. The patient has the lactation phone number to call as needed. Additional resources were placed on her AVS to be given at discharge.

## 2018-02-10 NOTE — PROGRESS NOTES
Will DC patient to room, baby staying extra night due to jaundice. DC instructions and prescriptions given.

## 2018-02-12 PROBLEM — O13.3 PREGNANCY-INDUCED HYPERTENSION IN THIRD TRIMESTER: Status: ACTIVE | Noted: 2018-02-12

## 2018-02-13 ENCOUNTER — TELEPHONE (OUTPATIENT)
Dept: LACTATION | Facility: CLINIC | Age: 33
End: 2018-02-13

## 2018-02-15 ENCOUNTER — TELEPHONE (OUTPATIENT)
Dept: LACTATION | Facility: CLINIC | Age: 33
End: 2018-02-15

## 2018-03-09 ENCOUNTER — PATIENT MESSAGE (OUTPATIENT)
Dept: OBSTETRICS AND GYNECOLOGY | Facility: CLINIC | Age: 33
End: 2018-03-09

## 2018-03-21 ENCOUNTER — POSTPARTUM VISIT (OUTPATIENT)
Dept: OBSTETRICS AND GYNECOLOGY | Facility: CLINIC | Age: 33
End: 2018-03-21
Payer: COMMERCIAL

## 2018-03-21 VITALS
WEIGHT: 190.94 LBS | BODY MASS INDEX: 28.94 KG/M2 | SYSTOLIC BLOOD PRESSURE: 120 MMHG | HEIGHT: 68 IN | DIASTOLIC BLOOD PRESSURE: 70 MMHG

## 2018-03-21 PROBLEM — Z34.00 PREGNANCY, SUPERVISION, NORMAL, FIRST: Status: RESOLVED | Noted: 2017-08-02 | Resolved: 2018-03-21

## 2018-03-21 PROBLEM — O13.3 PREGNANCY-INDUCED HYPERTENSION IN THIRD TRIMESTER: Status: RESOLVED | Noted: 2018-02-12 | Resolved: 2018-03-21

## 2018-03-21 PROBLEM — O43.193 MARGINAL INSERTION OF UMBILICAL CORD AFFECTING MANAGEMENT OF MOTHER IN THIRD TRIMESTER: Status: RESOLVED | Noted: 2018-01-03 | Resolved: 2018-03-21

## 2018-03-21 PROCEDURE — 0503F POSTPARTUM CARE VISIT: CPT | Mod: S$GLB,,, | Performed by: OBSTETRICS & GYNECOLOGY

## 2018-03-21 PROCEDURE — 99999 PR PBB SHADOW E&M-EST. PATIENT-LVL III: CPT | Mod: PBBFAC,,, | Performed by: OBSTETRICS & GYNECOLOGY

## 2018-03-21 NOTE — PROGRESS NOTES
Paula Armendariz is a 32 y.o. female  presents for a postpartum visit.  She is status post  6 weeks ago.  Her hospitalization was not complicated.  She is breastfeeding.  She desires condoms for contraception.  She denies postpartum depression.    Her last pap was normal    Past Medical History:   Diagnosis Date    PCOS (polycystic ovarian syndrome)      Past Surgical History:   Procedure Laterality Date    TONSILLECTOMY       Review of patient's allergies indicates:  No Known Allergies    Current Outpatient Prescriptions:     butalbital-acetaminophen-caff -40 mg Cap, TAKE 1 OR 2 CAPSULES BY MOUTH AS DIRECTED., Disp: 30 capsule, Rfl: 12    ibuprofen (ADVIL,MOTRIN) 800 MG tablet, Take 1 tablet (800 mg total) by mouth every 8 (eight) hours., Disp: 60 tablet, Rfl: 0    PRENATAL VIT/IRON FUM/FOLIC AC (PRENATAL 1+1 ORAL), Take by mouth., Disp: , Rfl:       Vitals:    18 1103   BP: 120/70       GENERAL: no distress  ABDOMEN: no masses, hepatosplenomegaly, no hernias  EXTERNAL GENITALIA POSTPARTUM: normal, well-healed, without lesions or masses   VAGINA POSTPARTUM: normal, well-healed, physiologic discharge, without lesions   CERVIX POSTPARTUM: normal, well-healed, without lesions   UTERUS POSTPARTUM: normal size, well involuted, firm, non-tender, ADNEXA POSTPARTUM: no masses palpable and nontender    Assessment:  Normal postpartum exam    Plan:  Routine follow up.

## 2018-05-30 ENCOUNTER — PATIENT MESSAGE (OUTPATIENT)
Dept: OBSTETRICS AND GYNECOLOGY | Facility: CLINIC | Age: 33
End: 2018-05-30

## 2018-05-30 NOTE — LETTER
May 30, 2018    Paula Armendariz  [address] [address]         [facility]  [address] [address]  Phone: [phone] May 30, 2018     Patient: Paula Armendariz   YOB: 1985   Date of Visit: 5/30/2018       To Whom It May Concern:    It is my medical opinion that Paula Armendariz may return to work on 5/2/18.    If you have any questions or concerns, please don't hesitate to call.    Sincerely,        Lupe Reed MD

## 2018-06-18 ENCOUNTER — OFFICE VISIT (OUTPATIENT)
Dept: INTERNAL MEDICINE | Facility: CLINIC | Age: 33
End: 2018-06-18
Payer: COMMERCIAL

## 2018-06-18 ENCOUNTER — PATIENT MESSAGE (OUTPATIENT)
Dept: INTERNAL MEDICINE | Facility: CLINIC | Age: 33
End: 2018-06-18

## 2018-06-18 VITALS
TEMPERATURE: 98 F | OXYGEN SATURATION: 99 % | WEIGHT: 194.44 LBS | DIASTOLIC BLOOD PRESSURE: 80 MMHG | HEART RATE: 98 BPM | HEIGHT: 69 IN | SYSTOLIC BLOOD PRESSURE: 122 MMHG | BODY MASS INDEX: 28.8 KG/M2

## 2018-06-18 DIAGNOSIS — H60.503 ACUTE OTITIS EXTERNA OF BOTH EARS, UNSPECIFIED TYPE: Primary | ICD-10-CM

## 2018-06-18 PROCEDURE — 99999 PR PBB SHADOW E&M-EST. PATIENT-LVL III: CPT | Mod: PBBFAC,,, | Performed by: INTERNAL MEDICINE

## 2018-06-18 PROCEDURE — 99213 OFFICE O/P EST LOW 20 MIN: CPT | Mod: S$GLB,,, | Performed by: INTERNAL MEDICINE

## 2018-06-18 RX ORDER — NEOMYCIN SULFATE, POLYMYXIN B SULFATE AND HYDROCORTISONE 10; 3.5; 1 MG/ML; MG/ML; [USP'U]/ML
3 SUSPENSION/ DROPS AURICULAR (OTIC) 4 TIMES DAILY
Qty: 10 ML | Refills: 0 | Status: SHIPPED | OUTPATIENT
Start: 2018-06-18 | End: 2018-06-25

## 2018-06-18 NOTE — PROGRESS NOTES
"  HPI:  Paula Armendariz is a 32 y.o. year old female that  presents with   Chief Complaint   Patient presents with    Otalgia     patient flew to [place] and back - extreme pain in both ears   .       Past Medical History:   Diagnosis Date    PCOS (polycystic ovarian syndrome)      Social History     Social History    Marital status:      Spouse name: N/A    Number of children: N/A    Years of education: N/A     Occupational History    Not on file.     Social History Main Topics    Smoking status: Never Smoker    Smokeless tobacco: Never Used    Alcohol use Yes      Comment: socially    Drug use: No    Sexual activity: Yes     Partners: Male     Birth control/ protection: None     Other Topics Concern    Not on file     Social History Narrative    No narrative on file     Past Surgical History:   Procedure Laterality Date    TONSILLECTOMY       Family History   Problem Relation Age of Onset    Diabetes Maternal Grandmother     Diabetes Maternal Grandfather     Ovarian cancer Neg Hx     Eclampsia Neg Hx            Patient complains of bilateral ear pain after flying to [place] on Friday and returning last night.  Patient complains of the right ear is worse in the left ear.  Patient did try of ibuprofen over the counter.  No significant relief with that.  Patient is currently nursing.  Patient denies any blood from her ears.  Patient does report having some congestion as well.        Health Maintenance Topics with due status: Due On       Topic Date Due    Pap Smear with HPV Cotest 06/15/2018       Review of Systems  Review of Systems   Constitutional: Negative for chills and fever.   Respiratory: Negative for cough and shortness of breath.    Cardiovascular: Negative for chest pain.         Physical Exam:  /80   Pulse 98   Temp 97.9 °F (36.6 °C)   Ht 5' 9" (1.753 m)   Wt 88.2 kg (194 lb 7.1 oz)   SpO2 99%   BMI 28.71 kg/m²   Physical Exam   Constitutional: She is " well-developed, well-nourished, and in no distress.   HENT:   Head: Normocephalic and atraumatic.   Right Ear: There is swelling and tenderness. No drainage. Decreased hearing is noted.   Left Ear: There is swelling and tenderness. No drainage. Decreased hearing is noted.   Mouth/Throat: Uvula is midline and mucous membranes are normal. Posterior oropharyngeal edema and posterior oropharyngeal erythema present. No oropharyngeal exudate or tonsillar abscesses.   Eyes: Conjunctivae and EOM are normal. Right eye exhibits no discharge. Left eye exhibits no discharge. No scleral icterus.   Neck: Normal range of motion. Neck supple. No JVD present. No tracheal deviation present. No thyromegaly present.   Lymphadenopathy:     She has no cervical adenopathy.         LABS:    No results found for this or any previous visit (from the past 2016 hour(s)).    Imaging:  Imaging Results    None           Assessment:    ICD-10-CM ICD-9-CM    1. Acute otitis externa of both ears, unspecified type H60.503 380.10      The encounter diagnosis was Acute otitis externa of both ears, unspecified type.      Plan:  No orders of the defined types were placed in this encounter.    Also rec'd Flonase otc and tylenol/advil prn  Pt to call if no change      Kavon Ross MD

## 2018-09-17 ENCOUNTER — PATIENT MESSAGE (OUTPATIENT)
Dept: OBSTETRICS AND GYNECOLOGY | Facility: CLINIC | Age: 33
End: 2018-09-17

## 2018-09-24 ENCOUNTER — OFFICE VISIT (OUTPATIENT)
Dept: OBSTETRICS AND GYNECOLOGY | Facility: CLINIC | Age: 33
End: 2018-09-24
Attending: OBSTETRICS & GYNECOLOGY
Payer: COMMERCIAL

## 2018-09-24 VITALS
SYSTOLIC BLOOD PRESSURE: 130 MMHG | BODY MASS INDEX: 27.39 KG/M2 | DIASTOLIC BLOOD PRESSURE: 80 MMHG | WEIGHT: 184.94 LBS | HEIGHT: 69 IN

## 2018-09-24 PROCEDURE — 99999 PR PBB SHADOW E&M-EST. PATIENT-LVL III: CPT | Mod: PBBFAC,,, | Performed by: OBSTETRICS & GYNECOLOGY

## 2018-09-24 PROCEDURE — 99213 OFFICE O/P EST LOW 20 MIN: CPT | Mod: S$GLB,,, | Performed by: OBSTETRICS & GYNECOLOGY

## 2018-09-24 RX ORDER — SERTRALINE HYDROCHLORIDE 50 MG/1
50 TABLET, FILM COATED ORAL DAILY
Qty: 30 TABLET | Refills: 2 | Status: SHIPPED | OUTPATIENT
Start: 2018-09-24 | End: 2019-05-20

## 2019-02-14 ENCOUNTER — OFFICE VISIT (OUTPATIENT)
Dept: OTOLARYNGOLOGY | Facility: CLINIC | Age: 34
End: 2019-02-14
Payer: COMMERCIAL

## 2019-02-14 VITALS
BODY MASS INDEX: 25.99 KG/M2 | SYSTOLIC BLOOD PRESSURE: 133 MMHG | WEIGHT: 176 LBS | HEART RATE: 98 BPM | DIASTOLIC BLOOD PRESSURE: 90 MMHG

## 2019-02-14 DIAGNOSIS — Z86.69 HISTORY OF MIGRAINE: ICD-10-CM

## 2019-02-14 DIAGNOSIS — J01.00 ACUTE MAXILLARY SINUSITIS, RECURRENCE NOT SPECIFIED: ICD-10-CM

## 2019-02-14 DIAGNOSIS — R09.81 NASAL CONGESTION: ICD-10-CM

## 2019-02-14 DIAGNOSIS — R09.82 POST-NASAL DRIP: ICD-10-CM

## 2019-02-14 DIAGNOSIS — J34.2 NASAL SEPTAL DEVIATION: ICD-10-CM

## 2019-02-14 DIAGNOSIS — K08.89 PAIN IN A TOOTH OR TEETH: Primary | ICD-10-CM

## 2019-02-14 DIAGNOSIS — R05.9 COUGH: ICD-10-CM

## 2019-02-14 DIAGNOSIS — Z91.09 HISTORY OF ENVIRONMENTAL ALLERGIES: ICD-10-CM

## 2019-02-14 DIAGNOSIS — Z83.6: ICD-10-CM

## 2019-02-14 DIAGNOSIS — J34.89 NASAL DISCHARGE: ICD-10-CM

## 2019-02-14 PROCEDURE — 87070 CULTURE OTHR SPECIMN AEROBIC: CPT

## 2019-02-14 PROCEDURE — 99999 PR PBB SHADOW E&M-EST. PATIENT-LVL III: ICD-10-PCS | Mod: PBBFAC,,, | Performed by: OTOLARYNGOLOGY

## 2019-02-14 PROCEDURE — 99203 PR OFFICE/OUTPT VISIT, NEW, LEVL III, 30-44 MIN: ICD-10-PCS | Mod: S$GLB,,, | Performed by: OTOLARYNGOLOGY

## 2019-02-14 PROCEDURE — 99203 OFFICE O/P NEW LOW 30 MIN: CPT | Mod: S$GLB,,, | Performed by: OTOLARYNGOLOGY

## 2019-02-14 PROCEDURE — 99999 PR PBB SHADOW E&M-EST. PATIENT-LVL III: CPT | Mod: PBBFAC,,, | Performed by: OTOLARYNGOLOGY

## 2019-02-14 NOTE — PROGRESS NOTES
Subjective:       Patient ID: Paula Armendariz is a 33 y.o. female.    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  Ms. Armendariz  indicates treatment of an acute sinus infection and urgent care center this past weekend.  She was treated with course of Augmentin and a steroid injection this past Sunday, 4 days ago.  Influenza swabs were negative for flu.  She has seen little relief of her right upper teeth and bilateral facial pressure symptoms.  She indicates nasal discharge which is greenish.  She was forced to lie down on the floor at work yesterday because she felt so poorly.    She indicates recent contraction of URI symptoms from her 1-year-old child who is in .    She ceased breast feeding several weeks ago. She believes she has been fighting a series of URIs the past few months essentially untreated.  She indicated suffering with rhinitis of pregnancy symptoms.  She indicates a history of allergies and hayfever.    She has a mother with suffered with an unusual form of fungal chronic sinusitis requiring a PICC line and treatment in [facility] where she lives. Her mother is a nurse.  She indicates remote history of migraine headaches which were quite significant in her teens.  She seems to have outgrown them.  She was treated by Dr. Ross 06/18/2018 for extreme pain in both ears status post a flight to [place] and back.  She was diagnosed with acute otitis externa of both ears.  Use of Flonase and Tylenol/Advil was recommended then    She has a a business trip to [place] scheduled this coming Monday in 4 days.    PMH:  Migraine, hayfever, polycystic ovarian syndrome  PSH:  Family history:  High blood pressure, asthma, migraine, diabetes, thyroid disease, arthritis  Allergies:  None known  Habits:  1 alcoholic drink per day and 2 caffeinated drinks per day  Occupation:  Real  estate  Review of Systems   Ears: Positive for hearing loss, ear pain, ear pressure, ringing in ear, ear infections, dizziness and family history of hearing loss.    Nose:  Positive for nosebleeds, nasal obstruction, loss of smell, postnasal drip and snoring.    Mouth/Throat: Positive for pain swallowing, impaired swallowing and hoarse voice.   Constitutional: Positive for fever, chills and night sweats.    Respiratory:  Positive for asthma and recent cough.    Other:  Positive for depression, anxiety and swollen glands. Negative for rash.     Current Outpatient Medications on File Prior to Visit   Medication Sig Dispense Refill    sertraline (ZOLOFT) 50 MG tablet Take 1 tablet (50 mg total) by mouth once daily. 30 tablet 2     No current facility-administered medications on file prior to visit.          Objective:     Blood pressure 133/90 pulse 98 temperature 97.1° height 5 ft 9 in weight 176 lb  General:  Alert and oriented lady in no acute distress  Both ears were examined under the microscope in the micro procedure room  Physical Exam   Constitutional: She is oriented to person, place, and time. She appears well-developed and well-nourished.   HENT:   Head: Normocephalic.       Right Ear: Tympanic membrane and external ear normal. No drainage. No foreign bodies. No mastoid tenderness. Tympanic membrane is not perforated. No decreased hearing is noted.   Left Ear: Tympanic membrane and external ear normal. No drainage. No foreign bodies. No mastoid tenderness. Tympanic membrane is not perforated. No decreased hearing is noted.   Ears:    Nose: Septal deviation present. No nasal deformity or nasal septal hematoma. No epistaxis. Right sinus exhibits no maxillary sinus tenderness and no frontal sinus tenderness. Left sinus exhibits no maxillary sinus tenderness and no frontal sinus tenderness.       Mouth/Throat: Uvula is midline, oropharynx is clear and moist and mucous membranes are normal. No oral lesions. No  trismus in the jaw. No uvula swelling. No oropharyngeal exudate or tonsillar abscesses.       Neck: Neck supple. No tracheal deviation present. No thyromegaly present.   Pulmonary/Chest: Effort normal. No stridor.   Lymphadenopathy:     She has no cervical adenopathy.   Neurological: She is alert and oriented to person, place, and time.   Skin: No rash noted.       Assessment:       1. Pain in a tooth or teeth    2. History of migraine    3. Nasal congestion    4. Acute maxillary sinusitis, recurrence not specified    5. Nasal discharge    6. Nasal septal deviation    7. Post-nasal drip    8. Cough    9. History of environmental allergies    10. Family history of chronic sinusitis        Plan:     Flonase use encouraged; 2 sprays @ lateral nostril once a day; may discontinue Astelin nasal spray for now  Mucinex D 60 mg in AM x 2 weeks if tolerated  Right nasal culture obtained; call for results in 4 days  Hydration encouraged   Continue Augmentin BID as previously prescribed  Pt. may use Afrin type spray  X 4 nights/4 days + scheduled day of travel ( 2/18/19)   [employer] Travel/E.T. Literature provided  Call for any significant change in status  Consider sinus CT pending course

## 2019-02-14 NOTE — PATIENT INSTRUCTIONS
Flonase use encouraged; 2 sprays @ lateral nostril once a day; may discontinue Astelin nasal spray for now  Mucinex D 60 mg in AM x 2 weeks if tolerated  Right nasal culture obtained; call for results in 4 days: few H. Influ Beta lactamase +  Hydration encouraged   Continue Augmentin BID as previously prescribed  Pt. may use Afrin type spray  X 4 nights/4 days + scheduled day of travel ( 2/18/19)   Air Travel/E.T. Literature provided  Call for any significant change in status  Consider sinus CT pending course

## 2019-02-16 LAB — BACTERIA SPEC AEROBE CULT: NORMAL

## 2019-05-20 ENCOUNTER — OFFICE VISIT (OUTPATIENT)
Dept: OBSTETRICS AND GYNECOLOGY | Facility: CLINIC | Age: 34
End: 2019-05-20
Attending: OBSTETRICS & GYNECOLOGY
Payer: COMMERCIAL

## 2019-05-20 VITALS
WEIGHT: 177 LBS | SYSTOLIC BLOOD PRESSURE: 130 MMHG | BODY MASS INDEX: 26.83 KG/M2 | DIASTOLIC BLOOD PRESSURE: 84 MMHG | HEIGHT: 68 IN

## 2019-05-20 DIAGNOSIS — Z01.419 WELL WOMAN EXAM WITH ROUTINE GYNECOLOGICAL EXAM: Primary | ICD-10-CM

## 2019-05-20 DIAGNOSIS — N97.0 INFERTILITY, ANOVULATION: ICD-10-CM

## 2019-05-20 PROCEDURE — 87624 HPV HI-RISK TYP POOLED RSLT: CPT

## 2019-05-20 PROCEDURE — 99395 PREV VISIT EST AGE 18-39: CPT | Mod: S$GLB,,, | Performed by: OBSTETRICS & GYNECOLOGY

## 2019-05-20 PROCEDURE — 99999 PR PBB SHADOW E&M-EST. PATIENT-LVL III: ICD-10-PCS | Mod: PBBFAC,,, | Performed by: OBSTETRICS & GYNECOLOGY

## 2019-05-20 PROCEDURE — 88142 CYTOPATH C/V THIN LAYER: CPT

## 2019-05-20 PROCEDURE — 99395 PR PREVENTIVE VISIT,EST,18-39: ICD-10-PCS | Mod: S$GLB,,, | Performed by: OBSTETRICS & GYNECOLOGY

## 2019-05-20 PROCEDURE — 99999 PR PBB SHADOW E&M-EST. PATIENT-LVL III: CPT | Mod: PBBFAC,,, | Performed by: OBSTETRICS & GYNECOLOGY

## 2019-05-20 RX ORDER — LETROZOLE 2.5 MG/1
TABLET, FILM COATED ORAL
Qty: 5 TABLET | Refills: 0 | Status: SHIPPED | OUTPATIENT
Start: 2019-05-20 | End: 2019-07-08 | Stop reason: SDUPTHER

## 2019-05-20 NOTE — PROGRESS NOTES
SUBJECTIVE:   33 y.o. female   for annual routine Pap and checkup. Patient's last menstrual period was 2019 (exact date)..  She has no unusual complaints and is interested in getting pregnant this summer.  Previously ovulated on femara.  Ovulation around day 23.  Used vaginal prometrium.        Past Medical History:   Diagnosis Date    PCOS (polycystic ovarian syndrome)      Past Surgical History:   Procedure Laterality Date    TONSILLECTOMY       Social History     Socioeconomic History    Marital status:      Spouse name: Not on file    Number of children: Not on file    Years of education: Not on file    Highest education level: Not on file   Occupational History    Not on file   Social Needs    Financial resource strain: Not on file    Food insecurity:     Worry: Not on file     Inability: Not on file    Transportation needs:     Medical: Not on file     Non-medical: Not on file   Tobacco Use    Smoking status: Never Smoker    Smokeless tobacco: Never Used   Substance and Sexual Activity    Alcohol use: Yes     Comment: socially    Drug use: No    Sexual activity: Yes     Partners: Male     Birth control/protection: Condom   Lifestyle    Physical activity:     Days per week: Not on file     Minutes per session: Not on file    Stress: Not on file   Relationships    Social connections:     Talks on phone: Not on file     Gets together: Not on file     Attends Sikhism service: Not on file     Active member of club or organization: Not on file     Attends meetings of clubs or organizations: Not on file     Relationship status: Not on file   Other Topics Concern    Not on file   Social History Narrative    Not on file     Family History   Problem Relation Age of Onset    Diabetes Maternal Grandmother     Diabetes Maternal Grandfather     Ovarian cancer Neg Hx     Eclampsia Neg Hx      OB History    Para Term  AB Living   1 1 1 0 0 1   SAB TAB Ectopic  "Multiple Live Births   0 0 0 0 1      # Outcome Date GA Lbr Jose Alejandro/2nd Weight Sex Delivery Anes PTL Lv   1 Term 02/07/18 39w0d  2.23 kg (4 lb 14.7 oz) F Vag-Spont  N CHRISTOPHER         Current Outpatient Medications   Medication Sig Dispense Refill    letrozole (FEMARA) 2.5 mg Tab Take one by mouth on days 5-9 of your cycle. 5 tablet 0     No current facility-administered medications for this visit.      Allergies: Patient has no known allergies.     ROS:  Constitutional: no weight loss, weight gain, fever, fatigue  Eyes:  No vision changes, glasses/contacts  ENT/Mouth: No ulcers, sinus problems, ears ringing, headache  Cardiovascular: No inability to lie flat, chest pain, exercise intolerance, swelling, heart palpitations  Respiratory: No wheezing, coughing blood, shortness of breath, or cough  Gastrointestinal: No diarrhea, bloody stool, nausea/vomiting, constipation, gas, hemorrhoids  Genitourinary: No blood in urine, painful urination, urgency of urination, frequency of urination, incomplete emptying, incontinence, abnormal bleeding, painful periods, heavy periods, vaginal discharge, vaginal odor, painful intercourse, sexual problems, bleeding after intercourse.  Musculoskeletal: No muscle weakness  Skin/Breast: No painful breasts, nipple discharge, masses, rash, ulcers  Neurological: No passing out, seizures, numbness, headache  Endocrine: No diabetes, hypothyroid, hyperthyroid, hot flashes, hair loss, abnormal hair growth, ance  Psychiatric: No depression, crying  Hematologic: No bruises, bleeding, swollen lymph nodes, anemia.      OBJECTIVE:   The patient appears well, alert, oriented x 3, in no distress.  /84   Ht 5' 8" (1.727 m)   Wt 80.3 kg (177 lb 0.5 oz)   LMP 05/07/2019 (Exact Date)   BMI 26.92 kg/m²   NECK: no thyromegaly, trachea midline  SKIN: no acne, striae, hirsutism  BREAST EXAM: breasts appear normal, no suspicious masses, no skin or nipple changes or axillary nodes  ABDOMEN: no hernias, " masses, or hepatosplenomegaly  GENITALIA: normal external genitalia, no erythema, no discharge  URETHRA: normal urethra, normal urethral meatus  VAGINA: Normal  CERVIX: no lesions or cervical motion tenderness  UTERUS: normal size, contour, position, consistency, mobility, non-tender  ADNEXA: normal adnexa and no mass, fullness, tenderness    \  ASSESSMENT:   .Paula was seen today for annual exam.    Diagnoses and all orders for this visit:    Well woman exam with routine gynecological exam  -     Liquid-based pap smear, screening  -     HPV High Risk Genotypes, PCR    Infertility, anovulation  -     letrozole (FEMARA) 2.5 mg Tab; Take one by mouth on days 5-9 of your cycle.

## 2019-05-24 LAB
HPV HR 12 DNA CVX QL NAA+PROBE: NEGATIVE
HPV16 AG SPEC QL: NEGATIVE
HPV18 DNA SPEC QL NAA+PROBE: NEGATIVE

## 2019-07-08 ENCOUNTER — TELEPHONE (OUTPATIENT)
Dept: OBSTETRICS AND GYNECOLOGY | Facility: CLINIC | Age: 34
End: 2019-07-08

## 2019-07-08 DIAGNOSIS — N97.0 INFERTILITY, ANOVULATION: ICD-10-CM

## 2019-07-08 RX ORDER — LETROZOLE 2.5 MG/1
TABLET, FILM COATED ORAL
Qty: 5 TABLET | Refills: 0 | Status: SHIPPED | OUTPATIENT
Start: 2019-07-08 | End: 2019-08-01 | Stop reason: SDUPTHER

## 2019-07-25 ENCOUNTER — TELEPHONE (OUTPATIENT)
Dept: OBSTETRICS AND GYNECOLOGY | Facility: CLINIC | Age: 34
End: 2019-07-25

## 2019-07-25 NOTE — TELEPHONE ENCOUNTER
----- Message from Tato Delong MA sent at 7/25/2019  3:09 PM CDT -----  Contact: EDWARD LUI [[id number]]      ----- Message -----  From: Ramila Mora  Sent: 7/25/2019   1:51 PM  To: Brianna MOLINA Staff    Name of Who is Calling: EDWARD LUI [[id number]]    What is the request in detail: Would like to speak with staff in regards to getting blood work orders put in for day 23 of her cycle. Please contact to further discuss and advise      Can the clinic reply by [facility]: yes    What Number to Call Back if not in [facility]:  [phone]

## 2019-07-26 NOTE — TELEPHONE ENCOUNTER
----- Message from Tato Delong MA sent at 7/25/2019  4:54 PM CDT -----  What is the request in detail: Would like to speak with staff in regards to getting blood work orders put in for day 23 of her cycle. Please contact to further discuss and advise       Can the clinic reply by [facility]: yes     What Number to Call Back if not in [facility]:  [phone]

## 2019-07-31 ENCOUNTER — LAB VISIT (OUTPATIENT)
Dept: LAB | Facility: OTHER | Age: 34
End: 2019-07-31
Attending: OBSTETRICS & GYNECOLOGY
Payer: COMMERCIAL

## 2019-07-31 DIAGNOSIS — N97.0 INFERTILITY, ANOVULATION: ICD-10-CM

## 2019-07-31 LAB — PROGEST SERPL-MCNC: 12.9 NG/ML

## 2019-07-31 PROCEDURE — 36415 COLL VENOUS BLD VENIPUNCTURE: CPT

## 2019-07-31 PROCEDURE — 84144 ASSAY OF PROGESTERONE: CPT

## 2019-08-01 ENCOUNTER — PATIENT MESSAGE (OUTPATIENT)
Dept: OBSTETRICS AND GYNECOLOGY | Facility: CLINIC | Age: 34
End: 2019-08-01

## 2019-08-01 DIAGNOSIS — N97.0 INFERTILITY, ANOVULATION: ICD-10-CM

## 2019-08-01 RX ORDER — LETROZOLE 2.5 MG/1
TABLET, FILM COATED ORAL
Qty: 5 TABLET | Refills: 5 | Status: SHIPPED | OUTPATIENT
Start: 2019-08-01 | End: 2020-05-13

## 2020-03-25 ENCOUNTER — PATIENT MESSAGE (OUTPATIENT)
Dept: OTOLARYNGOLOGY | Facility: CLINIC | Age: 35
End: 2020-03-25

## 2020-03-25 ENCOUNTER — TELEPHONE (OUTPATIENT)
Dept: OTOLARYNGOLOGY | Facility: CLINIC | Age: 35
End: 2020-03-25

## 2020-04-10 ENCOUNTER — PATIENT MESSAGE (OUTPATIENT)
Dept: OBSTETRICS AND GYNECOLOGY | Facility: CLINIC | Age: 35
End: 2020-04-10

## 2020-04-16 ENCOUNTER — TELEPHONE (OUTPATIENT)
Dept: OBSTETRICS AND GYNECOLOGY | Facility: CLINIC | Age: 35
End: 2020-04-16

## 2020-04-16 NOTE — TELEPHONE ENCOUNTER
----- Message from Joanne Jean LPN sent at 4/13/2020  4:47 PM CDT -----  04/13/2020  Please schedule in office or virtual visit at 10 weeks

## 2020-04-30 ENCOUNTER — TELEPHONE (OUTPATIENT)
Dept: OBSTETRICS AND GYNECOLOGY | Facility: CLINIC | Age: 35
End: 2020-04-30

## 2020-04-30 NOTE — TELEPHONE ENCOUNTER
----- Message from Dewey Huffman sent at 4/30/2020  9:58 AM CDT -----  Ob pt would like to talk to nurse about her reschedule. Pt can be reached at [phone].

## 2020-05-13 ENCOUNTER — INITIAL PRENATAL (OUTPATIENT)
Dept: OBSTETRICS AND GYNECOLOGY | Facility: CLINIC | Age: 35
End: 2020-05-13
Attending: OBSTETRICS & GYNECOLOGY
Payer: COMMERCIAL

## 2020-05-13 ENCOUNTER — TELEPHONE (OUTPATIENT)
Dept: OBSTETRICS AND GYNECOLOGY | Facility: CLINIC | Age: 35
End: 2020-05-13

## 2020-05-13 VITALS
BODY MASS INDEX: 26.55 KG/M2 | DIASTOLIC BLOOD PRESSURE: 70 MMHG | WEIGHT: 174.63 LBS | SYSTOLIC BLOOD PRESSURE: 120 MMHG

## 2020-05-13 DIAGNOSIS — Z87.59 HISTORY OF GESTATIONAL HYPERTENSION: ICD-10-CM

## 2020-05-13 DIAGNOSIS — O09.299 HISTORY OF IUGR (INTRAUTERINE GROWTH RETARDATION) AND STILLBIRTH, CURRENTLY PREGNANT: ICD-10-CM

## 2020-05-13 DIAGNOSIS — O09.91 SUPERVISION OF HIGH RISK PREGNANCY IN FIRST TRIMESTER: ICD-10-CM

## 2020-05-13 DIAGNOSIS — J34.9 SINUS PROBLEM: Primary | ICD-10-CM

## 2020-05-13 PROBLEM — O09.90 PREGNANCY, SUPERVISION, HIGH-RISK: Status: ACTIVE | Noted: 2020-05-13

## 2020-05-13 LAB
ABO + RH BLD: NORMAL
ALBUMIN SERPL BCP-MCNC: 4 G/DL (ref 3.5–5.2)
ALP SERPL-CCNC: 56 U/L (ref 55–135)
ALT SERPL W/O P-5'-P-CCNC: 16 U/L (ref 10–44)
ANION GAP SERPL CALC-SCNC: 10 MMOL/L (ref 8–16)
AST SERPL-CCNC: 17 U/L (ref 10–40)
BASOPHILS # BLD AUTO: 0.04 K/UL (ref 0–0.2)
BASOPHILS NFR BLD: 0.5 % (ref 0–1.9)
BILIRUB SERPL-MCNC: 0.5 MG/DL (ref 0.1–1)
BLD GP AB SCN CELLS X3 SERPL QL: NORMAL
BUN SERPL-MCNC: 6 MG/DL (ref 6–20)
CALCIUM SERPL-MCNC: 9.2 MG/DL (ref 8.7–10.5)
CHLORIDE SERPL-SCNC: 103 MMOL/L (ref 95–110)
CO2 SERPL-SCNC: 23 MMOL/L (ref 23–29)
CREAT SERPL-MCNC: 0.7 MG/DL (ref 0.5–1.4)
CREAT UR-MCNC: 41 MG/DL (ref 15–325)
DIFFERENTIAL METHOD: ABNORMAL
EOSINOPHIL # BLD AUTO: 0.7 K/UL (ref 0–0.5)
EOSINOPHIL NFR BLD: 9.1 % (ref 0–8)
ERYTHROCYTE [DISTWIDTH] IN BLOOD BY AUTOMATED COUNT: 12.4 % (ref 11.5–14.5)
EST. GFR  (AFRICAN AMERICAN): >60 ML/MIN/1.73 M^2
EST. GFR  (NON AFRICAN AMERICAN): >60 ML/MIN/1.73 M^2
GLUCOSE SERPL-MCNC: 92 MG/DL (ref 70–110)
HCT VFR BLD AUTO: 42.4 % (ref 37–48.5)
HGB BLD-MCNC: 13.1 G/DL (ref 12–16)
IMM GRANULOCYTES # BLD AUTO: 0.02 K/UL (ref 0–0.04)
IMM GRANULOCYTES NFR BLD AUTO: 0.3 % (ref 0–0.5)
LYMPHOCYTES # BLD AUTO: 1.6 K/UL (ref 1–4.8)
LYMPHOCYTES NFR BLD: 21.7 % (ref 18–48)
MCH RBC QN AUTO: 29.1 PG (ref 27–31)
MCHC RBC AUTO-ENTMCNC: 30.9 G/DL (ref 32–36)
MCV RBC AUTO: 94 FL (ref 82–98)
MONOCYTES # BLD AUTO: 0.5 K/UL (ref 0.3–1)
MONOCYTES NFR BLD: 6.4 % (ref 4–15)
NEUTROPHILS # BLD AUTO: 4.7 K/UL (ref 1.8–7.7)
NEUTROPHILS NFR BLD: 62 % (ref 38–73)
NRBC BLD-RTO: 0 /100 WBC
PLATELET # BLD AUTO: 272 K/UL (ref 150–350)
PMV BLD AUTO: 12.2 FL (ref 9.2–12.9)
POTASSIUM SERPL-SCNC: 4 MMOL/L (ref 3.5–5.1)
PROT SERPL-MCNC: 7.2 G/DL (ref 6–8.4)
PROT UR-MCNC: <7 MG/DL (ref 0–15)
PROT/CREAT UR: NORMAL MG/G{CREAT} (ref 0–0.2)
RBC # BLD AUTO: 4.5 M/UL (ref 4–5.4)
SODIUM SERPL-SCNC: 136 MMOL/L (ref 136–145)
WBC # BLD AUTO: 7.51 K/UL (ref 3.9–12.7)

## 2020-05-13 PROCEDURE — 86703 HIV-1/HIV-2 1 RESULT ANTBDY: CPT

## 2020-05-13 PROCEDURE — 99999 PR PBB SHADOW E&M-EST. PATIENT-LVL III: CPT | Mod: PBBFAC,,, | Performed by: OBSTETRICS & GYNECOLOGY

## 2020-05-13 PROCEDURE — 80053 COMPREHEN METABOLIC PANEL: CPT

## 2020-05-13 PROCEDURE — 87340 HEPATITIS B SURFACE AG IA: CPT

## 2020-05-13 PROCEDURE — 83036 HEMOGLOBIN GLYCOSYLATED A1C: CPT

## 2020-05-13 PROCEDURE — 86762 RUBELLA ANTIBODY: CPT

## 2020-05-13 PROCEDURE — 86850 RBC ANTIBODY SCREEN: CPT

## 2020-05-13 PROCEDURE — 86787 VARICELLA-ZOSTER ANTIBODY: CPT

## 2020-05-13 PROCEDURE — 87086 URINE CULTURE/COLONY COUNT: CPT

## 2020-05-13 PROCEDURE — 84156 ASSAY OF PROTEIN URINE: CPT

## 2020-05-13 PROCEDURE — 86592 SYPHILIS TEST NON-TREP QUAL: CPT

## 2020-05-13 PROCEDURE — 99999 PR PBB SHADOW E&M-EST. PATIENT-LVL III: ICD-10-PCS | Mod: PBBFAC,,, | Performed by: OBSTETRICS & GYNECOLOGY

## 2020-05-13 PROCEDURE — 0502F PR SUBSEQUENT PRENATAL CARE: ICD-10-PCS | Mod: CPTII,S$GLB,, | Performed by: OBSTETRICS & GYNECOLOGY

## 2020-05-13 PROCEDURE — 87491 CHLMYD TRACH DNA AMP PROBE: CPT

## 2020-05-13 PROCEDURE — 85025 COMPLETE CBC W/AUTO DIFF WBC: CPT

## 2020-05-13 PROCEDURE — 86803 HEPATITIS C AB TEST: CPT

## 2020-05-13 PROCEDURE — 0502F SUBSEQUENT PRENATAL CARE: CPT | Mod: CPTII,S$GLB,, | Performed by: OBSTETRICS & GYNECOLOGY

## 2020-05-13 RX ORDER — NAPROXEN SODIUM 220 MG/1
81 TABLET, FILM COATED ORAL DAILY
COMMUNITY
Start: 2020-05-13 | End: 2021-01-06

## 2020-05-13 RX ORDER — DIPHENHYDRAMINE HCL 50 MG
50 CAPSULE ORAL EVERY 6 HOURS PRN
COMMUNITY
End: 2021-03-04

## 2020-05-13 NOTE — PROGRESS NOTES
IVF pregnancy.  Counseled, desires nuchal.  She complains of constant rhinitis, congestion.  She had similar issues with her first pregnancy.  Desires to see ent.    Paula was seen today for initial prenatal visit.    Diagnoses and all orders for this visit:    Sinus problem  -     Ambulatory referral/consult to ENT; Future    Supervision of high risk pregnancy in first trimester  -     HIV 1/2 Ag/Ab (4th Gen)  -     RPR  -     Hepatitis B Surface Antigen  -     Type & Screen - Ob Profile  -     Rubella Antibody, IgG  -     Hemoglobin A1C  -     Urine culture  -     C. trachomatis/N. gonorrhoeae by AMP DNA  -     CBC auto differential  -     Comprehensive metabolic panel  -     HEPATITIS C ANTIBODY  -     US MFM Procedure ([facility]); Future  -     Varicella zoster antibody, IgG    History of IUGR (intrauterine growth retardation) and stillbirth, currently pregnant    History of gestational hypertension  -     Protein / creatinine ratio, urine  -     aspirin 81 MG Chew; Take 1 tablet (81 mg total) by mouth once daily.    Other orders  -     Cancel: US MFM Procedure ([facility]); Future

## 2020-05-14 DIAGNOSIS — Z01.812 PRE-PROCEDURE LAB EXAM: Primary | ICD-10-CM

## 2020-05-14 LAB
BACTERIA UR CULT: NORMAL
BACTERIA UR CULT: NORMAL
ESTIMATED AVG GLUCOSE: 103 MG/DL (ref 68–131)
HBA1C MFR BLD HPLC: 5.2 % (ref 4–5.6)
HBV SURFACE AG SERPL QL IA: NEGATIVE
HCV AB SERPL QL IA: NEGATIVE
HIV 1+2 AB+HIV1 P24 AG SERPL QL IA: NEGATIVE
RPR SER QL: NORMAL
RUBV IGG SER-ACNC: 11.1 IU/ML
RUBV IGG SER-IMP: REACTIVE

## 2020-05-15 LAB
C TRACH DNA SPEC QL NAA+PROBE: NOT DETECTED
N GONORRHOEA DNA SPEC QL NAA+PROBE: NOT DETECTED

## 2020-05-18 ENCOUNTER — LAB VISIT (OUTPATIENT)
Dept: INTERNAL MEDICINE | Facility: CLINIC | Age: 35
End: 2020-05-18
Payer: COMMERCIAL

## 2020-05-18 DIAGNOSIS — Z01.812 PRE-PROCEDURE LAB EXAM: ICD-10-CM

## 2020-05-18 LAB
VARICELLA INTERPRETATION: POSITIVE
VARICELLA ZOSTER IGG: 2.22 ISR (ref 0–0.9)

## 2020-05-18 PROCEDURE — U0003 INFECTIOUS AGENT DETECTION BY NUCLEIC ACID (DNA OR RNA); SEVERE ACUTE RESPIRATORY SYNDROME CORONAVIRUS 2 (SARS-COV-2) (CORONAVIRUS DISEASE [COVID-19]), AMPLIFIED PROBE TECHNIQUE, MAKING USE OF HIGH THROUGHPUT TECHNOLOGIES AS DESCRIBED BY CMS-2020-01-R: HCPCS

## 2020-05-19 LAB — SARS-COV-2 RNA RESP QL NAA+PROBE: NOT DETECTED

## 2020-05-20 ENCOUNTER — OFFICE VISIT (OUTPATIENT)
Dept: OTOLARYNGOLOGY | Facility: CLINIC | Age: 35
End: 2020-05-20
Attending: OBSTETRICS & GYNECOLOGY
Payer: COMMERCIAL

## 2020-05-20 ENCOUNTER — PROCEDURE VISIT (OUTPATIENT)
Dept: MATERNAL FETAL MEDICINE | Facility: CLINIC | Age: 35
End: 2020-05-20
Attending: OBSTETRICS & GYNECOLOGY
Payer: COMMERCIAL

## 2020-05-20 ENCOUNTER — TELEPHONE (OUTPATIENT)
Dept: OBSTETRICS AND GYNECOLOGY | Facility: CLINIC | Age: 35
End: 2020-05-20

## 2020-05-20 ENCOUNTER — LAB VISIT (OUTPATIENT)
Dept: LAB | Facility: OTHER | Age: 35
End: 2020-05-20
Attending: OBSTETRICS & GYNECOLOGY
Payer: COMMERCIAL

## 2020-05-20 VITALS — WEIGHT: 173.31 LBS | BODY MASS INDEX: 26.35 KG/M2

## 2020-05-20 VITALS
DIASTOLIC BLOOD PRESSURE: 94 MMHG | SYSTOLIC BLOOD PRESSURE: 120 MMHG | HEART RATE: 118 BPM | TEMPERATURE: 99 F | HEIGHT: 68 IN | WEIGHT: 173.13 LBS | BODY MASS INDEX: 26.24 KG/M2

## 2020-05-20 DIAGNOSIS — Z36.82 ENCOUNTER FOR ANTENATAL SCREENING FOR NUCHAL TRANSLUCENCY: ICD-10-CM

## 2020-05-20 DIAGNOSIS — Z36.89 ENCOUNTER FOR FETAL ANATOMIC SURVEY: ICD-10-CM

## 2020-05-20 DIAGNOSIS — J32.9 CHRONIC RECURRENT SINUSITIS: Primary | ICD-10-CM

## 2020-05-20 DIAGNOSIS — J34.9 SINUS PROBLEM: ICD-10-CM

## 2020-05-20 DIAGNOSIS — J31.0 RHINITIS MEDICAMENTOSA: ICD-10-CM

## 2020-05-20 DIAGNOSIS — O09.811 PREGNANCY RESULTING FROM IN VITRO FERTILIZATION IN FIRST TRIMESTER: ICD-10-CM

## 2020-05-20 DIAGNOSIS — T48.5X5A RHINITIS MEDICAMENTOSA: ICD-10-CM

## 2020-05-20 DIAGNOSIS — Z36.82 ENCOUNTER FOR ANTENATAL SCREENING FOR NUCHAL TRANSLUCENCY: Primary | ICD-10-CM

## 2020-05-20 DIAGNOSIS — J30.9 ALLERGIC RHINITIS, UNSPECIFIED SEASONALITY, UNSPECIFIED TRIGGER: ICD-10-CM

## 2020-05-20 DIAGNOSIS — O09.91 SUPERVISION OF HIGH RISK PREGNANCY IN FIRST TRIMESTER: ICD-10-CM

## 2020-05-20 PROCEDURE — 36415 COLL VENOUS BLD VENIPUNCTURE: CPT

## 2020-05-20 PROCEDURE — 76813 OB US NUCHAL MEAS 1 GEST: CPT | Mod: S$GLB,,, | Performed by: OBSTETRICS & GYNECOLOGY

## 2020-05-20 PROCEDURE — 31231 PR NASAL ENDOSCOPY, DX: ICD-10-PCS | Mod: S$GLB,,, | Performed by: OTOLARYNGOLOGY

## 2020-05-20 PROCEDURE — 99244 OFF/OP CNSLTJ NEW/EST MOD 40: CPT | Mod: 25,S$GLB,, | Performed by: OTOLARYNGOLOGY

## 2020-05-20 PROCEDURE — 76813 PR US, OB NUCHAL, TRANSABDOM/TRANSVAG, FIRST GESTATION: ICD-10-PCS | Mod: S$GLB,,, | Performed by: OBSTETRICS & GYNECOLOGY

## 2020-05-20 PROCEDURE — 81508 FTL CGEN ABNOR TWO PROTEINS: CPT

## 2020-05-20 PROCEDURE — 31231 NASAL ENDOSCOPY DX: CPT | Mod: S$GLB,,, | Performed by: OTOLARYNGOLOGY

## 2020-05-20 PROCEDURE — 99244 PR OFFICE CONSULTATION,LEVEL IV: ICD-10-PCS | Mod: 25,S$GLB,, | Performed by: OTOLARYNGOLOGY

## 2020-05-20 RX ORDER — PSEUDOEPHEDRINE HCL 120 MG/1
120 TABLET, FILM COATED, EXTENDED RELEASE ORAL
COMMUNITY
End: 2020-08-11

## 2020-05-20 NOTE — PROGRESS NOTES
Ms. Armendariz     Vitals:    05/20/20 0951   BP: (!) 120/94   Pulse: (!) 118   Temp: 98.8 °F (37.1 °C)       Chief Complaint:  Sinus Problem       HPI:  Mrs. Armendariz is a 34-year-old white female who presents referred by Dr. Lupe Reed with complaints of chronic sinus infections and congestion.  She has a past history 3-4 sinus infections annually with purulent nasal discharge.  She states that she treats approximately half of these with antibiotics but feels that she is constantly sick.  She has a 2-year-old daughter and did have rhinitis of pregnancy during that time exacerbating her issues.  She also breast fed for 1 year which limited her treatment options.  She is now 13 weeks pregnant again through IVF and is having severe nasal congestion causing her marked sleeping issues.  She is chronically tired and seems to be at her wits end.  She does have a history of allergies and was tested when she was in [place] though no immunotherapy treatments were offered.  She is taking Sudafed when severely congested as allowed by her OBGYN.  She also states that she has been on Afrin for 4-5 weeks now.  Additionally she complains of facial pressure pains and headaches.  Her last infection was in late February early March and did have a course of antibiotic therapy.  She is using nasal saline spray but not sinus rinses.    Review of Systems:  Constitutional:   weight loss or weight gain:  Some weight gain with pregnancy.  Allergy/Immunologic:   Positive  Nasal Congestion/Obstruction:   Positive for severe nasal congestion.  Nosebleeds:   Negative  Sinus infections:   Positive for chronic recurrent sinus infections.  Headache/Facial Pain:   Positive  Snoring/LAVINIA:   Negative  Throat: Infections/Pain:   Negative  Hoarseness/Speech Disturbance:   Negative  Trauma Hx:  Negative    Cardiovascular:  M/I Angina: Negative  Hypertension: Negative  Endocrine:    DM/Steroids: Negative  GI:   Dysphagia/Reflux: Negative  :   GYN Pregnancy:   13 weeks pregnant.  Renal:   Dialysis: Negative  Lymphatic:   Neck Mass/Lymphadenopathy: Negative  Muscoloskeletal:   Negative  Hematologic:   Bleeding Disorders/Anemia: Negative  Neurologic:    Cranial/Neuralgia: Negative  Pulmonary:   Asthma/SOB/Cough: Negative  Skin Disorders: Negative    Past Medical/Surgical/Family/Social History:    ENT Surgery: Negative  Occupational Exposure: Negative   Problems: Negative  Cancer: Negative    Past Family History:   Family history of Cancer: Negative    Past Social History:   Tobacco: Nonsmoker   Alcohol: Social Drinker      Allergies and medications: Reviewed per med card.    Physical Examination:  Ears:   External auditory canals:  Clear   Hearing: Grossly intact   Tympanic Membranes: Clear  Nose:   External:  Poor nasal tip support with tip ptosis causing some valve collapse.  Positive Nu maneuver.   Intranasal:  Septal deviation and spur to the right with 2+ turbinates and marked mucosal edema creating 95% obstruction.  Application of lidocaine and Madi-Synephrine pledgets creates some slight the congestion.  Mouth:   Intraorally: Lips, teeth, and gums: Normal   Oropharynx: Normal   Mucosa: Normal   Tongue: Normal  Throat:      Palate:  Short palate and uvula, Mallampati 1   Tonsils:  Absent   Posterior Pharynx: Normal  Fiberoptic exam:  Exam with scope number [id number] confirms septal deviation and spur to the right with 2+ turbinates.  Intranasal mucosa throughout appears erythematous and edematous.  No purulence or polyps are noted at this time.  Head/Face:     Inspection: Normal and atraumatic   Palpation/Percussion: Non tender   Facial strength: Normal and symmetric   Salivary glands: Normal  Neck: Supple  Thyroid: No masses  Lymphatics: No nodes  Respiratory:   Effort: Normal  Eyes:   Ocular Mobility: Normal   Vision: Grossly intact  Neuro/Psych:   Cranial Nerves: Grossly Intact   Orientation: Normal   Mood/Affect: Normal      Assessment/Plan:  I have  discussed my findings with her in detail as well as my recommendations for treatment.  I have attempted to contact Dr. Reed however she is out until next Monday.  I have left a message with my number in order to discuss Ms. Armendariz with her.  My impression is: 1) allergic rhinitis          2) rhinitis of pregnancy                                3) rhinitis medicamentosa                                4) chronic recurrent sinusitis  I have discussed all of these with Ms. Armendariz in detail.  Were she not pregnant at the time I would recommend discontinuing the Afrin and place her on a prednisone steroid taper over 2 weeks.  For now I have recommended NeilMed sinus rinses utilizing distilled water and I have given her literature on this and described use in detail.  She can also continue with her Afrin realizing that this is addictive and she is already having rebound edema however the need for her rest I believe outweighs this at this time.  I have also discussed possibility of Astelin spray but again all of these treatments will need to be cleared by Dr. Reed.  Ultimately she will need an allergy consultation as well as a [employer] CT scan of her sinuses.  When she is not pregnant or breast feeding we have discussed surgical interventions including nasal reconstruction with tip support, septoplasty, submucous resection of turbinates and possible Fess should a CT scan show evidence of chronic recurrent sinus infections.  She understands that surgery will not correct allergies however.  We will wait to hear from Dr. Reed next week to finalize our plan.  Mrs. Armendariz will contact me via the portal next Tuesday for follow-up.

## 2020-05-20 NOTE — TELEPHONE ENCOUNTER
----- Message from Ramila Mora sent at 5/20/2020 10:24 AM CDT -----  Contact: Kendra (Dr. Pete Sue - ENT)  Name of Who is Calling: Kendra (Dr. Pete Sue - ENT)    What is the request in detail: Dr. Sue would like to speak with Dr. Reed in regards to the patient. Patient is being seen by him today. Please contact to further discuss and advise      What [facility]: [phone] (Dr. Sue's mobile number)

## 2020-05-20 NOTE — LETTER
May 20, 2020      Lupe Reed MD  [address]  [address]  [place] [address]           [facility] [address]  [address]  [facility] [address]  Phone: [phone]  Fax: [phone]          Patient: Paula Armendariz   MR Number: [id number]   YOB: 1985   Date of Visit: 5/20/2020       Dear Dr. Lupe Reed:    Thank you for referring Paula Armendariz to me for evaluation. Attached you will find relevant portions of my assessment and plan of care.    If you have questions, please do not hesitate to call me. I look forward to following Paula Armendariz along with you.    Sincerely,    [place] YAA Sue III, MD    Enclosure  CC:  No Recipients    If you would like to receive this communication electronically, please contact [email or web address] or [phone] to request more information on [facility] access.    For providers and/or their staff who would like to refer a patient to [facility], please contact us through our one-stop-shop provider referral line, [facility], at [phone].    If you feel you have received this communication in error or would no longer like to receive these types of communications, please e-mail [email or web address]

## 2020-05-27 LAB
# FETUSES US: NORMAL
AGE AT DELIVERY: 33
B-HCG MOM SERPL: NORMAL
B-HCG SERPL-ACNC: 55 IU/ML
FET CRL US.MEAS: 69 MM
FET NASAL BONE LENGTH US.MEAS: NORMAL MM
FET NUCHAL FOLD MOM THICKNESS US.MEAS: NORMAL
FET NUCHAL FOLD THICKNESS US.MEAS: 1.9 MM
FET TS 21 RISK FROM MAT AGE: NORMAL
GA (DAYS): 1 D
GA (WEEKS): 13 WK
IDDM PATIENT QL: NORMAL
INTEGRATED SCN PATIENT-IMP NAR: NORMAL
INTEGRATED SCN PATIENT-IMP: NEGATIVE
PAPP-A MOM SERPL: NORMAL
PAPP-A SERPL-MCNC: NORMAL NG/ML
SMOKING STATUS FTND: NO
TS 18 RISK FETUS: NORMAL
TS 21 RISK FETUS: NORMAL
US DATE: NORMAL

## 2020-06-11 ENCOUNTER — ROUTINE PRENATAL (OUTPATIENT)
Dept: OBSTETRICS AND GYNECOLOGY | Facility: CLINIC | Age: 35
End: 2020-06-11
Payer: COMMERCIAL

## 2020-06-11 VITALS
SYSTOLIC BLOOD PRESSURE: 122 MMHG | BODY MASS INDEX: 26.25 KG/M2 | DIASTOLIC BLOOD PRESSURE: 76 MMHG | WEIGHT: 172.63 LBS

## 2020-06-11 DIAGNOSIS — O09.299 HISTORY OF IUGR (INTRAUTERINE GROWTH RETARDATION) AND STILLBIRTH, CURRENTLY PREGNANT: ICD-10-CM

## 2020-06-11 DIAGNOSIS — J32.9 CHRONIC SINUSITIS, UNSPECIFIED LOCATION: ICD-10-CM

## 2020-06-11 DIAGNOSIS — Z87.59 HISTORY OF GESTATIONAL HYPERTENSION: ICD-10-CM

## 2020-06-11 DIAGNOSIS — O09.92 SUPERVISION OF HIGH RISK PREGNANCY IN SECOND TRIMESTER: Primary | ICD-10-CM

## 2020-06-11 PROCEDURE — 81511 FTL CGEN ABNOR FOUR ANAL: CPT

## 2020-06-11 PROCEDURE — 99999 PR PBB SHADOW E&M-EST. PATIENT-LVL III: ICD-10-PCS | Mod: PBBFAC,,, | Performed by: OBSTETRICS & GYNECOLOGY

## 2020-06-11 PROCEDURE — 99999 PR PBB SHADOW E&M-EST. PATIENT-LVL III: CPT | Mod: PBBFAC,,, | Performed by: OBSTETRICS & GYNECOLOGY

## 2020-06-11 PROCEDURE — 0502F PR SUBSEQUENT PRENATAL CARE: ICD-10-PCS | Mod: CPTII,S$GLB,, | Performed by: OBSTETRICS & GYNECOLOGY

## 2020-06-11 PROCEDURE — 0502F SUBSEQUENT PRENATAL CARE: CPT | Mod: CPTII,S$GLB,, | Performed by: OBSTETRICS & GYNECOLOGY

## 2020-06-11 RX ORDER — METHYLPREDNISOLONE 4 MG/1
TABLET ORAL
Qty: 1 PACKAGE | Refills: 0 | Status: SHIPPED | OUTPATIENT
Start: 2020-06-11 | End: 2020-07-02

## 2020-06-11 RX ORDER — AZELASTINE 1 MG/ML
1 SPRAY, METERED NASAL 2 TIMES DAILY
Qty: 30 ML | Refills: 3 | Status: SHIPPED | OUTPATIENT
Start: 2020-06-11 | End: 2020-08-11

## 2020-06-11 NOTE — PROGRESS NOTES
Chief Complaint   Patient presents with    Routine Prenatal Visit       34 y.o., at 15w6d by Estimated Date of Delivery: 20    Complaints today: continued chronic sinus pressure. Stated that she is continuing to use the Afrin. Discussed Dr. Levi visit and recommendation. She is interested in attempting whatever we think is safe.    She is concerned about travel restrictions for her cousins wedding at the end of August. Discussed pregnant women are not at increased risk for COVID and have been seeing minimal vertical transmission to newborns. However, it is always safe to be cautious and socially distance.    Asking if her  being at the anatomy scan. Discussed he will  Be allowed to be there.     ROS  OBSTETRICS:   Contractions n   Bleeding n   Loss of fluid n   Fetal mvmnt n  GASTRO:   Nausea n   Vomiting n      OB History    Para Term  AB Living   3 1 1 0 1 1   SAB TAB Ectopic Multiple Live Births   0 0 0 0 1      # Outcome Date GA Lbr Jose Alejandro/2nd Weight Sex Delivery Anes PTL Lv   3 Current            2 Term 18 39w0d  2.23 kg (4 lb 14.7 oz) F Vag-Spont  N CHRISTOPHER   1 AB                Dating reviewed  Allergies and problem list reviewed and updated  Medical and surgical history reviewed  Prenatal labs reviewed and updated    PHYSICAL EXAM  /76   Wt 78.3 kg (172 lb 9.9 oz)   LMP 2020   BMI 26.25 kg/m²     GENERAL: No acute distress  HEENT: Normocephalic  NEURO: Alert and oriented x3  PSYCH: Normal mood and affect  PULMONARY: Non-labored respiration; no tachypnea  ABD: Soft, gravid, nontender; no hernia or hepatosplenomegaly    ASSESSMENT AND PLAN    pregnancy Problems (from 20 to present)     Problem Noted Resolved    Pregnancy, supervision, high-risk 2020 by Lupe Reed MD No    History of IUGR (intrauterine growth retardation) and stillbirth, currently pregnant 2020 by Lupe Reed MD No    History of gestational hypertension 2020 by Lupe ALMENDAREZ  MD Brianna Yandy Mitchell was seen today for routine prenatal visit.    Diagnoses and all orders for this visit:    Supervision of high risk pregnancy in second trimester  -     Maternal Sequential Screen Part 2    History of IUGR (intrauterine growth retardation) and stillbirth, currently pregnant    History of gestational hypertension    Chronic sinusitis, unspecified location  -     methylPREDNISolone (MEDROL, ALFA,) 4 mg tablet; use as directed  -     azelastine (ASTELIN) 137 mcg (0.1 %) nasal spray; 1 spray (137 mcg total) by Nasal route 2 (two) times daily.    OB Needs assessment   -endorses anxiety/depression in the past - postpartum after last baby and was on zoloft   -endorses anxiety/depression in last 7 days   -at next visit discuss starting zoloft again prior to delivery       labor precautions given  Follow-up: 4 weeks    Radha Brasher MD  OBGYN PGY-1

## 2020-06-15 LAB
# FETUSES US: NORMAL
AFP MOM SERPL: 1.45
AFP SERPL-MCNC: 42.7 NG/ML
AGE AT DELIVERY: 33
B-HCG MOM SERPL: 0.44
B-HCG SERPL-ACNC: 15.4 IU/ML
COLLECT DATE BLD: NORMAL
COLLECT DATE: NORMAL
FET NASAL BONE LENGTH US.MEAS: NORMAL MM
FET NUCHAL FOLD MOM THICKNESS US.MEAS: 1.35
FET NUCHAL FOLD THICKNESS US.MEAS: 1.9 MM
FET TS 21 RISK FROM MAT AGE: NORMAL
GA (DAYS): 1 D
GA (WEEKS): 13 WK
GA METHOD: NORMAL
GEST. AGE (DAYS) 2ND SAMPLE (SS2): 2
GEST. AGE (WKS) 2ND SAMPLE (SS2): 16
IDDM PATIENT QL: NORMAL
INHIBIN A MOM SERPL: 1.19
INHIBIN A SERPL-MCNC: 174.8 PG/ML
INTEGRATED SCN PATIENT-IMP: NEGATIVE
PAPP-A MOM SERPL: 1.19
PAPP-A SERPL-MCNC: NORMAL NG/ML
SEQUENTIAL SCREEN PART 2 INTERP: NORMAL
TS 18 RISK FETUS: NORMAL
TS 21 RISK FETUS: NORMAL
U ESTRIOL MOM SERPL: 0.8
U ESTRIOL SERPL-MCNC: 0.7 NG/ML

## 2020-07-08 ENCOUNTER — ROUTINE PRENATAL (OUTPATIENT)
Dept: OBSTETRICS AND GYNECOLOGY | Facility: CLINIC | Age: 35
End: 2020-07-08
Payer: COMMERCIAL

## 2020-07-08 VITALS
WEIGHT: 176.38 LBS | SYSTOLIC BLOOD PRESSURE: 130 MMHG | BODY MASS INDEX: 26.82 KG/M2 | DIASTOLIC BLOOD PRESSURE: 70 MMHG

## 2020-07-08 DIAGNOSIS — F41.9 ANXIETY: Primary | ICD-10-CM

## 2020-07-08 DIAGNOSIS — O99.891 HX OF POSTPARTUM DEPRESSION, CURRENTLY PREGNANT: ICD-10-CM

## 2020-07-08 DIAGNOSIS — N76.1 SUBACUTE VAGINITIS: ICD-10-CM

## 2020-07-08 DIAGNOSIS — Z86.59 HX OF POSTPARTUM DEPRESSION, CURRENTLY PREGNANT: ICD-10-CM

## 2020-07-08 PROCEDURE — 99999 PR PBB SHADOW E&M-EST. PATIENT-LVL III: ICD-10-PCS | Mod: PBBFAC,,, | Performed by: ADVANCED PRACTICE MIDWIFE

## 2020-07-08 PROCEDURE — 0502F SUBSEQUENT PRENATAL CARE: CPT | Mod: CPTII,S$GLB,, | Performed by: ADVANCED PRACTICE MIDWIFE

## 2020-07-08 PROCEDURE — 99999 PR PBB SHADOW E&M-EST. PATIENT-LVL III: CPT | Mod: PBBFAC,,, | Performed by: ADVANCED PRACTICE MIDWIFE

## 2020-07-08 PROCEDURE — 0502F PR SUBSEQUENT PRENATAL CARE: ICD-10-PCS | Mod: CPTII,S$GLB,, | Performed by: ADVANCED PRACTICE MIDWIFE

## 2020-07-08 RX ORDER — FLUCONAZOLE 200 MG/1
200 TABLET ORAL EVERY OTHER DAY
Qty: 2 TABLET | Refills: 0 | Status: SHIPPED | OUTPATIENT
Start: 2020-07-08 | End: 2020-07-11

## 2020-07-08 RX ORDER — SERTRALINE HYDROCHLORIDE 25 MG/1
25 TABLET, FILM COATED ORAL DAILY
Qty: 30 TABLET | Refills: 2 | Status: SHIPPED | OUTPATIENT
Start: 2020-07-08 | End: 2020-08-11

## 2020-07-08 NOTE — PROGRESS NOTES
Reason for visit: Routine Prenatal Visit    HPI:   34 y.o., at 19w5d by Estimated Date of Delivery: 20    Pt in with no c/o.     ROS:  OBSTETRICS  - Contractions: denies  - Bleeding: denies  - Loss of fluid: denies  - Fetal movement: reports slight  GASTRO  - Nausea: none  - Vomiting: none  NEURO  - Headache: none      Past medical, surgical, social, and family, and history: Reviewed and updated in EMR.  Medications: Reviewed and updated in EMR.  Allergies: Adhesive     OB History    Para Term  AB Living   3 1 1 0 1 1   SAB TAB Ectopic Multiple Live Births   0 0 0 0 1      # Outcome Date GA Lbr Jose Alejandro/2nd Weight Sex Delivery Anes PTL Lv   3 Current            2 Term 18 39w0d  2.23 kg (4 lb 14.7 oz) F Vag-Spont  N CHRISTOPHER   1 AB                Pregnancy dating, labs, ultrasound reports, prenatal testing, and problem list: Reviewed and updated in EMR.    pregnancy Problems (from 20 to present)     Problem Noted Resolved    Hx of postpartum depression, currently pregnant 2020 by Rosalinda Ramirez CNM No    Pregnancy, supervision, high-risk 2020 by Lupe Reed MD No    History of IUGR (intrauterine growth retardation) and stillbirth, currently pregnant 2020 by Lupe Reed MD No    History of gestational hypertension 2020 by Lupe Reed MD No            Vitals: /70   Wt 80 kg (176 lb 5.9 oz)   LMP 2020   BMI 26.82 kg/m²     Physical exam:  GENERAL: No acute distress  HEENT: Normocephalic  NEURO: Alert and oriented x3  PSYCH: Normal mood and affect  PULMONARY: Non-labored respiration; no tachypnea  ABD: Soft, gravid, nontender; no hernia or hepatosplenomegaly    Assessment and Plan:    Anxiety  -     sertraline (ZOLOFT) 25 MG tablet; Take 1 tablet (25 mg total) by mouth once daily.  Dispense: 30 tablet; Refill: 2    Subacute vaginitis  -     fluconazole (DIFLUCAN) 200 MG Tab; Take 1 tablet (200 mg total) by mouth every other day. for 2 doses  Dispense: 2  "tablet; Refill: 0    Hx of postpartum depression, currently pregnant      Discussed option for beginning zoloft per Dr. Reed"s recommendation and pt agrees and desires to start meds. Rx prescribed with instructions.  Has anatomy scan scheduled 20   labor precautions given  Follow-up: 4 weeks    "

## 2020-07-14 ENCOUNTER — PROCEDURE VISIT (OUTPATIENT)
Dept: MATERNAL FETAL MEDICINE | Facility: CLINIC | Age: 35
End: 2020-07-14
Attending: OBSTETRICS & GYNECOLOGY
Payer: COMMERCIAL

## 2020-07-14 DIAGNOSIS — Z36.89 ENCOUNTER FOR ULTRASOUND TO CHECK FETAL GROWTH: Primary | ICD-10-CM

## 2020-07-14 DIAGNOSIS — Z36.89 ENCOUNTER FOR FETAL ANATOMIC SURVEY: ICD-10-CM

## 2020-07-14 DIAGNOSIS — O09.812 PREGNANCY RESULTING FROM IN VITRO FERTILIZATION IN SECOND TRIMESTER: ICD-10-CM

## 2020-07-14 PROCEDURE — 76811 OB US DETAILED SNGL FETUS: CPT | Mod: S$GLB,,, | Performed by: OBSTETRICS & GYNECOLOGY

## 2020-07-14 PROCEDURE — 76811 PR US, OB FETAL EVAL & EXAM, TRANSABDOM,FIRST GESTATION: ICD-10-PCS | Mod: S$GLB,,, | Performed by: OBSTETRICS & GYNECOLOGY

## 2020-07-20 ENCOUNTER — LAB VISIT (OUTPATIENT)
Dept: URGENT CARE | Facility: CLINIC | Age: 35
End: 2020-07-20
Payer: COMMERCIAL

## 2020-07-20 ENCOUNTER — TELEPHONE (OUTPATIENT)
Dept: OBSTETRICS AND GYNECOLOGY | Facility: CLINIC | Age: 35
End: 2020-07-20

## 2020-07-20 DIAGNOSIS — R05.9 COUGH: ICD-10-CM

## 2020-07-20 DIAGNOSIS — Z20.822 CLOSE EXPOSURE TO COVID-19 VIRUS: ICD-10-CM

## 2020-07-20 DIAGNOSIS — Z20.822 CLOSE EXPOSURE TO COVID-19 VIRUS: Primary | ICD-10-CM

## 2020-07-20 PROCEDURE — U0003 INFECTIOUS AGENT DETECTION BY NUCLEIC ACID (DNA OR RNA); SEVERE ACUTE RESPIRATORY SYNDROME CORONAVIRUS 2 (SARS-COV-2) (CORONAVIRUS DISEASE [COVID-19]), AMPLIFIED PROBE TECHNIQUE, MAKING USE OF HIGH THROUGHPUT TECHNOLOGIES AS DESCRIBED BY CMS-2020-01-R: HCPCS

## 2020-07-20 NOTE — PROGRESS NOTES
Subjective:       Patient ID: Paula Armendariz is a 34 y.o. female.    Vitals:  vitals were not taken for this visit.     Chief Complaint: COVID-19 Concerns (swab )    covid swab     Other  This is a new problem. The current episode started today. Pertinent negatives include no arthralgias, chest pain, chills, congestion, coughing, fatigue, fever, headaches, joint swelling, myalgias, nausea, rash, sore throat, vertigo, vomiting or weakness.       Constitution: Negative for chills, fatigue and fever.   HENT: Negative for congestion and sore throat.    Neck: Negative for painful lymph nodes.   Cardiovascular: Negative for chest pain and leg swelling.   Eyes: Negative for double vision and blurred vision.   Respiratory: Negative for cough and shortness of breath.    Gastrointestinal: Negative for nausea, vomiting and diarrhea.   Genitourinary: Negative for dysuria, frequency, urgency and history of kidney stones.   Musculoskeletal: Negative for joint pain, joint swelling, muscle cramps and muscle ache.   Skin: Negative for color change, pale, rash and bruising.   Allergic/Immunologic: Negative for seasonal allergies.   Neurological: Negative for dizziness, history of vertigo, light-headedness, passing out and headaches.   Hematologic/Lymphatic: Negative for swollen lymph nodes.   Psychiatric/Behavioral: Negative for nervous/anxious, sleep disturbance and depression. The patient is not nervous/anxious.        Objective:      Physical Exam      Assessment:       1. Close Exposure to Covid-19 Virus    2. Cough        Plan:         Close Exposure to Covid-19 Virus    Cough  -     COVID-19 Routine Screening

## 2020-07-20 NOTE — TELEPHONE ENCOUNTER
----- Message from Tato Delong MA sent at 7/20/2020  1:45 PM CDT -----  Pt states she thinks she have COVID she said her daughter went to camp and a kid tested +. Pt states she feels horrible and want a test. Please advise  ----- Message -----  From: Ramila Mora  Sent: 7/20/2020  10:00 AM CDT  To: Brianna MOLINA Staff    Name of Who is Calling: EDWARD LUI [[id number]]    What is the request in detail: Patient states she is 21 weeks pregnant and states she has been having COVID-19 like symptoms. She states she is experiencing cough, fatigue, sore throat and fever (100.5). Please contact to further discuss and advise      Can the clinic reply by [facility]: no    What Number to Call Back [facility]: [phone]

## 2020-07-23 LAB — SARS-COV-2 RNA RESP QL NAA+PROBE: NOT DETECTED

## 2020-08-11 ENCOUNTER — ROUTINE PRENATAL (OUTPATIENT)
Dept: OBSTETRICS AND GYNECOLOGY | Facility: CLINIC | Age: 35
End: 2020-08-11
Payer: COMMERCIAL

## 2020-08-11 VITALS
WEIGHT: 187.81 LBS | BODY MASS INDEX: 28.56 KG/M2 | SYSTOLIC BLOOD PRESSURE: 138 MMHG | DIASTOLIC BLOOD PRESSURE: 76 MMHG

## 2020-08-11 DIAGNOSIS — O09.92 SUPERVISION OF HIGH RISK PREGNANCY IN SECOND TRIMESTER: ICD-10-CM

## 2020-08-11 DIAGNOSIS — Z87.59 HISTORY OF GESTATIONAL HYPERTENSION: ICD-10-CM

## 2020-08-11 DIAGNOSIS — Z86.59 HX OF POSTPARTUM DEPRESSION, CURRENTLY PREGNANT: ICD-10-CM

## 2020-08-11 DIAGNOSIS — O99.891 HX OF POSTPARTUM DEPRESSION, CURRENTLY PREGNANT: ICD-10-CM

## 2020-08-11 DIAGNOSIS — O09.299 HISTORY OF IUGR (INTRAUTERINE GROWTH RETARDATION) AND STILLBIRTH, CURRENTLY PREGNANT: ICD-10-CM

## 2020-08-11 DIAGNOSIS — O99.810 ABNORMAL GLUCOSE TOLERANCE TEST (GTT) DURING PREGNANCY, ANTEPARTUM: Primary | ICD-10-CM

## 2020-08-11 LAB
BASOPHILS # BLD AUTO: 0.05 K/UL (ref 0–0.2)
BASOPHILS NFR BLD: 0.6 % (ref 0–1.9)
DIFFERENTIAL METHOD: ABNORMAL
EOSINOPHIL # BLD AUTO: 0.3 K/UL (ref 0–0.5)
EOSINOPHIL NFR BLD: 3.3 % (ref 0–8)
ERYTHROCYTE [DISTWIDTH] IN BLOOD BY AUTOMATED COUNT: 13.4 % (ref 11.5–14.5)
HCT VFR BLD AUTO: 40.8 % (ref 37–48.5)
HGB BLD-MCNC: 12.3 G/DL (ref 12–16)
IMM GRANULOCYTES # BLD AUTO: 0.06 K/UL (ref 0–0.04)
IMM GRANULOCYTES NFR BLD AUTO: 0.7 % (ref 0–0.5)
LYMPHOCYTES # BLD AUTO: 1.6 K/UL (ref 1–4.8)
LYMPHOCYTES NFR BLD: 18.4 % (ref 18–48)
MCH RBC QN AUTO: 29.5 PG (ref 27–31)
MCHC RBC AUTO-ENTMCNC: 30.1 G/DL (ref 32–36)
MCV RBC AUTO: 98 FL (ref 82–98)
MONOCYTES # BLD AUTO: 0.4 K/UL (ref 0.3–1)
MONOCYTES NFR BLD: 4.9 % (ref 4–15)
NEUTROPHILS # BLD AUTO: 6.2 K/UL (ref 1.8–7.7)
NEUTROPHILS NFR BLD: 72.1 % (ref 38–73)
NRBC BLD-RTO: 0 /100 WBC
PLATELET # BLD AUTO: 270 K/UL (ref 150–350)
PMV BLD AUTO: 11.7 FL (ref 9.2–12.9)
RBC # BLD AUTO: 4.17 M/UL (ref 4–5.4)
WBC # BLD AUTO: 8.59 K/UL (ref 3.9–12.7)

## 2020-08-11 PROCEDURE — 99999 PR PBB SHADOW E&M-EST. PATIENT-LVL III: ICD-10-PCS | Mod: PBBFAC,,, | Performed by: OBSTETRICS & GYNECOLOGY

## 2020-08-11 PROCEDURE — 0502F PR SUBSEQUENT PRENATAL CARE: ICD-10-PCS | Mod: CPTII,S$GLB,, | Performed by: OBSTETRICS & GYNECOLOGY

## 2020-08-11 PROCEDURE — 82952 GTT-ADDED SAMPLES: CPT

## 2020-08-11 PROCEDURE — 99999 PR PBB SHADOW E&M-EST. PATIENT-LVL III: CPT | Mod: PBBFAC,,, | Performed by: OBSTETRICS & GYNECOLOGY

## 2020-08-11 PROCEDURE — 82951 GLUCOSE TOLERANCE TEST (GTT): CPT | Mod: 59

## 2020-08-11 PROCEDURE — 0502F SUBSEQUENT PRENATAL CARE: CPT | Mod: CPTII,S$GLB,, | Performed by: OBSTETRICS & GYNECOLOGY

## 2020-08-11 PROCEDURE — 82950 GLUCOSE TEST: CPT

## 2020-08-11 PROCEDURE — 85025 COMPLETE CBC W/AUTO DIFF WBC: CPT

## 2020-08-11 RX ORDER — SERTRALINE HYDROCHLORIDE 50 MG/1
50 TABLET, FILM COATED ORAL DAILY
Qty: 30 TABLET | Refills: 12 | Status: SHIPPED | OUTPATIENT
Start: 2020-08-11 | End: 2021-08-30 | Stop reason: SDUPTHER

## 2020-08-11 NOTE — PROGRESS NOTES
Paula Armendariz is a 34 y.o. [id number] at 24w4d presents for routine prenatal visit.  This IUP is complicated by anxiety/depression (on Zoloft but desires increased dose), hx of IUGR, hx of PreE (on ASA).  Patient denies contractions, denies vaginal bleeding, denies LOF.   Fetal Movement: normal.   She has no complaints today.      /76   Wt 85.2 kg (187 lb 13.3 oz)   LMP 2020   BMI 28.56 kg/m²     34 y.o., at 24w4d by Estimated Date of Delivery: 20  Patient Active Problem List   Diagnosis    PCOS (polycystic ovarian syndrome)    Pregnancy, supervision, high-risk    History of IUGR (intrauterine growth retardation) and stillbirth, currently pregnant    History of gestational hypertension    Hx of postpartum depression, currently pregnant     OB History    Para Term  AB Living   3 1 1 0 1 1   SAB TAB Ectopic Multiple Live Births   0 0 0 0 1      # Outcome Date GA Lbr Jose Alejandro/2nd Weight Sex Delivery Anes PTL Lv   3 Current            2 Term 18 39w0d  2.23 kg (4 lb 14.7 oz) F Vag-Spont  N CHIRSTOPHER   1 AB                Dating reviewed    Allergies and problem list reviewed and updated    Medical and surgical history reviewed    Prenatal labs reviewed and updated    Physical Exam:  ABD: soft, gravid, nontender  FH: 27 cm  FHT: 155 bpm    Paula was seen today for routine prenatal visit.    Diagnoses and all orders for this visit:    Supervision of high risk pregnancy in second trimester  -     OB Glucose Screen  -     CBC auto differential    History of IUGR (intrauterine growth retardation) and stillbirth, currently pregnant    History of gestational hypertension    Hx of postpartum depression, currently pregnant          Assessment/Plan:  1. IUP @ 24 weeks  - Glucola & CBC today   - On ASA daily for hx of gHTN   - Breastfeeding. Prescription for pump given today.     2. Anxiety/Depression  - Desires increase in Zoloft dose. Will go up to 50 mg.       Follow up 4 Weeks, carmina  counts, labor precautions      Carol Sewell M.D.  [facility] PGY-2

## 2020-08-12 ENCOUNTER — TELEPHONE (OUTPATIENT)
Dept: OBSTETRICS AND GYNECOLOGY | Facility: CLINIC | Age: 35
End: 2020-08-12

## 2020-08-12 LAB — GLUCOSE SERPL-MCNC: 154 MG/DL (ref 70–140)

## 2020-08-12 NOTE — TELEPHONE ENCOUNTER
----- Message from Carol Sewell MD sent at 8/12/2020  2:23 PM CDT -----  Failed 1 hour.   Please schedule for 3 hour.

## 2020-08-14 LAB
GLUCOSE SERPL-MCNC: 120 MG/DL
GLUCOSE SERPL-MCNC: 120 MG/DL
GLUCOSE SERPL-MCNC: 59 MG/DL
GLUCOSE SERPL-MCNC: 78 MG/DL (ref 70–110)

## 2020-08-18 ENCOUNTER — TELEPHONE (OUTPATIENT)
Dept: OBSTETRICS AND GYNECOLOGY | Facility: CLINIC | Age: 35
End: 2020-08-18

## 2020-08-18 NOTE — LETTER
August 18, 2020    Paula Armendariz  [address] [address]             [facility]  [address] [place] [address]  Phone: [phone] To whom it may concern:    I am seeing Ms. Armendariz for her pregnancy.  Estimated Date of Delivery: 11/27/20.      If you have any questions or concerns, please don't hesitate to call.    Sincerely,        Lupe Reed MD

## 2020-08-18 NOTE — TELEPHONE ENCOUNTER
----- Message from Tato Delong MA sent at 8/14/2020 10:49 AM CDT -----  Dr. Reed,     My Human Resources department is requesting a letter from you stating my expected due date (Nov 26) in order to begin processing my leave paperwork. Is this something you can provide via the portal? Thank you so much!     -lion Siu is aware that you are out of the clinic and when you will return. She is not expecting an immediate response.

## 2020-09-17 ENCOUNTER — ROUTINE PRENATAL (OUTPATIENT)
Dept: OBSTETRICS AND GYNECOLOGY | Facility: CLINIC | Age: 35
End: 2020-09-17
Payer: COMMERCIAL

## 2020-09-17 VITALS
BODY MASS INDEX: 28.76 KG/M2 | DIASTOLIC BLOOD PRESSURE: 78 MMHG | WEIGHT: 189.13 LBS | SYSTOLIC BLOOD PRESSURE: 126 MMHG

## 2020-09-17 DIAGNOSIS — O09.93 SUPERVISION OF HIGH RISK PREGNANCY IN THIRD TRIMESTER: Primary | ICD-10-CM

## 2020-09-17 DIAGNOSIS — O09.299 HISTORY OF IUGR (INTRAUTERINE GROWTH RETARDATION) AND STILLBIRTH, CURRENTLY PREGNANT: ICD-10-CM

## 2020-09-17 DIAGNOSIS — Z87.59 HISTORY OF GESTATIONAL HYPERTENSION: ICD-10-CM

## 2020-09-17 DIAGNOSIS — O09.813 PREGNANCY RESULTING FROM IN VITRO FERTILIZATION IN THIRD TRIMESTER: ICD-10-CM

## 2020-09-17 DIAGNOSIS — O99.891 HX OF POSTPARTUM DEPRESSION, CURRENTLY PREGNANT: ICD-10-CM

## 2020-09-17 DIAGNOSIS — Z23 NEED FOR TDAP VACCINATION: ICD-10-CM

## 2020-09-17 DIAGNOSIS — Z67.91 RH NEGATIVE STATE IN ANTEPARTUM PERIOD: ICD-10-CM

## 2020-09-17 DIAGNOSIS — Z86.59 HX OF POSTPARTUM DEPRESSION, CURRENTLY PREGNANT: ICD-10-CM

## 2020-09-17 DIAGNOSIS — O26.899 RH NEGATIVE STATE IN ANTEPARTUM PERIOD: ICD-10-CM

## 2020-09-17 DIAGNOSIS — Z23 NEEDS FLU SHOT: ICD-10-CM

## 2020-09-17 PROCEDURE — 0502F SUBSEQUENT PRENATAL CARE: CPT | Mod: CPTII,S$GLB,, | Performed by: OBSTETRICS & GYNECOLOGY

## 2020-09-17 PROCEDURE — 90686 FLU VACCINE (QUAD) GREATER THAN OR EQUAL TO 3YO PRESERVATIVE FREE IM: ICD-10-PCS | Mod: S$GLB,,, | Performed by: OBSTETRICS & GYNECOLOGY

## 2020-09-17 PROCEDURE — 99999 PR PBB SHADOW E&M-EST. PATIENT-LVL III: ICD-10-PCS | Mod: PBBFAC,,, | Performed by: OBSTETRICS & GYNECOLOGY

## 2020-09-17 PROCEDURE — 96372 RHO (D) IMMUNE GLOBULIN: ICD-10-PCS | Mod: 59,S$GLB,, | Performed by: OBSTETRICS & GYNECOLOGY

## 2020-09-17 PROCEDURE — 90715 TDAP VACCINE 7 YRS/> IM: CPT | Mod: S$GLB,,, | Performed by: OBSTETRICS & GYNECOLOGY

## 2020-09-17 PROCEDURE — 99999 PR PBB SHADOW E&M-EST. PATIENT-LVL III: CPT | Mod: PBBFAC,,, | Performed by: OBSTETRICS & GYNECOLOGY

## 2020-09-17 PROCEDURE — 90471 IMMUNIZATION ADMIN: CPT | Mod: S$GLB,,, | Performed by: OBSTETRICS & GYNECOLOGY

## 2020-09-17 PROCEDURE — 90472 IMMUNIZATION ADMIN EACH ADD: CPT | Mod: S$GLB,,, | Performed by: OBSTETRICS & GYNECOLOGY

## 2020-09-17 PROCEDURE — 90686 IIV4 VACC NO PRSV 0.5 ML IM: CPT | Mod: S$GLB,,, | Performed by: OBSTETRICS & GYNECOLOGY

## 2020-09-17 PROCEDURE — 90715 TDAP VACCINE GREATER THAN OR EQUAL TO 7YO IM: ICD-10-PCS | Mod: S$GLB,,, | Performed by: OBSTETRICS & GYNECOLOGY

## 2020-09-17 PROCEDURE — 96372 THER/PROPH/DIAG INJ SC/IM: CPT | Mod: 59,S$GLB,, | Performed by: OBSTETRICS & GYNECOLOGY

## 2020-09-17 PROCEDURE — 0502F PR SUBSEQUENT PRENATAL CARE: ICD-10-PCS | Mod: CPTII,S$GLB,, | Performed by: OBSTETRICS & GYNECOLOGY

## 2020-09-17 PROCEDURE — 90472 TDAP VACCINE GREATER THAN OR EQUAL TO 7YO IM: ICD-10-PCS | Mod: S$GLB,,, | Performed by: OBSTETRICS & GYNECOLOGY

## 2020-09-17 PROCEDURE — 90471 FLU VACCINE (QUAD) GREATER THAN OR EQUAL TO 3YO PRESERVATIVE FREE IM: ICD-10-PCS | Mod: S$GLB,,, | Performed by: OBSTETRICS & GYNECOLOGY

## 2020-09-17 NOTE — PROGRESS NOTES
Complaints today:none, Good fetal movements reported, No Bleeding or pains    /78   Wt 85.8 kg (189 lb 2.5 oz)   LMP 2020   BMI 28.76 kg/m²     34 y.o., at 29w6d by Estimated Date of Delivery: 20  Patient Active Problem List   Diagnosis    PCOS (polycystic ovarian syndrome)    Pregnancy, supervision, high-risk/breast/pump/fmla    History of IUGR (intrauterine growth retardation) and stillbirth, currently pregnant    History of gestational hypertension    Hx of postpartum depression, currently pregnant    Rh negative state in antepartum period    Pregnancy resulting from in vitro fertilization in third trimester     OB History    Para Term  AB Living   3 1 1 0 1 1   SAB TAB Ectopic Multiple Live Births   0 0 0 0 1      # Outcome Date GA Lbr Jose Alejandro/2nd Weight Sex Delivery Anes PTL Lv   3 Current            2 Term 18 39w0d  2.23 kg (4 lb 14.7 oz) F Vag-Spont  N CHRISTOPHER   1 AB                Dating reviewed    Allergies and problem list reviewed and updated    Medical and surgical history reviewed    Prenatal labs reviewed and updated    Physical Exam:  ABD: soft, gravid, nontender, measuring appropriately   FH: 29 cm       Assessment:    Assessment/Plan:  1. IUP @ 29 weeks  - TDaP, RhoGam, Flu shot today   - On ASA daily for hx of gHTN      2. Anxiety/Depression  - Doing well with Zoloft 50mg       Plan:   Follow up 4 Weeks, kick counts, labor precautions        Ashley Gary MD   PGY-1, OB-GYN

## 2020-09-17 NOTE — PROGRESS NOTES
Pt was given 3 injections today per Dr. Reed. Pt denies pain before and after injections. Pt instructed to wait 15 minutes after injections. Pt voiced understanding. Pt denies any problems 15 minutes after injections given.  Fluavarix   Lot:                   Exp: 06/30/2021     Right Deltoid  TDAP        Lot: [id number]            Exp: 04/30/2022      Left Deltoid  Rhogam    Lot: [id number]    Exp: 01/17/2022      Left Gluteus

## 2020-09-30 ENCOUNTER — ROUTINE PRENATAL (OUTPATIENT)
Dept: OBSTETRICS AND GYNECOLOGY | Facility: CLINIC | Age: 35
End: 2020-09-30
Payer: COMMERCIAL

## 2020-09-30 VITALS
BODY MASS INDEX: 28.89 KG/M2 | DIASTOLIC BLOOD PRESSURE: 80 MMHG | SYSTOLIC BLOOD PRESSURE: 112 MMHG | WEIGHT: 190.06 LBS

## 2020-09-30 DIAGNOSIS — Z87.59 HISTORY OF GESTATIONAL HYPERTENSION: ICD-10-CM

## 2020-09-30 DIAGNOSIS — Z86.59 HX OF POSTPARTUM DEPRESSION, CURRENTLY PREGNANT: ICD-10-CM

## 2020-09-30 DIAGNOSIS — O09.93 SUPERVISION OF HIGH RISK PREGNANCY IN THIRD TRIMESTER: Primary | ICD-10-CM

## 2020-09-30 DIAGNOSIS — O09.299 HISTORY OF IUGR (INTRAUTERINE GROWTH RETARDATION) AND STILLBIRTH, CURRENTLY PREGNANT: ICD-10-CM

## 2020-09-30 DIAGNOSIS — O99.891 HX OF POSTPARTUM DEPRESSION, CURRENTLY PREGNANT: ICD-10-CM

## 2020-09-30 PROCEDURE — 99999 PR PBB SHADOW E&M-EST. PATIENT-LVL III: CPT | Mod: PBBFAC,,, | Performed by: OBSTETRICS & GYNECOLOGY

## 2020-09-30 PROCEDURE — 0502F SUBSEQUENT PRENATAL CARE: CPT | Mod: CPTII,S$GLB,, | Performed by: OBSTETRICS & GYNECOLOGY

## 2020-09-30 PROCEDURE — 99999 PR PBB SHADOW E&M-EST. PATIENT-LVL III: ICD-10-PCS | Mod: PBBFAC,,, | Performed by: OBSTETRICS & GYNECOLOGY

## 2020-09-30 PROCEDURE — 0502F PR SUBSEQUENT PRENATAL CARE: ICD-10-PCS | Mod: CPTII,S$GLB,, | Performed by: OBSTETRICS & GYNECOLOGY

## 2020-09-30 NOTE — PROGRESS NOTES
Good fm.  Denies ctx, vb, lof.  She cracked a tooth and is seeing the dentist tomorrow.  Precautions given.  Baby feels transverse today.  Paula was seen today for routine prenatal visit.    Diagnoses and all orders for this visit:    Supervision of high risk pregnancy in third trimester    History of IUGR (intrauterine growth retardation) and stillbirth, currently pregnant    History of gestational hypertension    Hx of postpartum depression, currently pregnant     follow up ultrasound.

## 2020-10-08 ENCOUNTER — PATIENT MESSAGE (OUTPATIENT)
Dept: OBSTETRICS AND GYNECOLOGY | Facility: CLINIC | Age: 35
End: 2020-10-08

## 2020-10-08 ENCOUNTER — PROCEDURE VISIT (OUTPATIENT)
Dept: MATERNAL FETAL MEDICINE | Facility: CLINIC | Age: 35
End: 2020-10-08
Attending: OBSTETRICS & GYNECOLOGY
Payer: COMMERCIAL

## 2020-10-08 ENCOUNTER — TELEPHONE (OUTPATIENT)
Dept: OBSTETRICS AND GYNECOLOGY | Facility: CLINIC | Age: 35
End: 2020-10-08

## 2020-10-08 DIAGNOSIS — Z36.89 ENCOUNTER FOR ULTRASOUND TO CHECK FETAL GROWTH: ICD-10-CM

## 2020-10-08 DIAGNOSIS — O09.813 PREGNANCY RESULTING FROM IN VITRO FERTILIZATION IN THIRD TRIMESTER: Primary | ICD-10-CM

## 2020-10-08 DIAGNOSIS — O09.813 PREGNANCY RESULTING FROM IN VITRO FERTILIZATION IN THIRD TRIMESTER: ICD-10-CM

## 2020-10-08 PROCEDURE — 76819 FETAL BIOPHYS PROFIL W/O NST: CPT | Mod: S$GLB,,, | Performed by: OBSTETRICS & GYNECOLOGY

## 2020-10-08 PROCEDURE — 76819 PR US, OB, FETAL BIOPHYSICAL, W/O NST: ICD-10-PCS | Mod: S$GLB,,, | Performed by: OBSTETRICS & GYNECOLOGY

## 2020-10-08 PROCEDURE — 76816 PR  US,PREGNANT UTERUS,F/U,TRANSABD APP: ICD-10-PCS | Mod: S$GLB,,, | Performed by: OBSTETRICS & GYNECOLOGY

## 2020-10-08 PROCEDURE — 76816 OB US FOLLOW-UP PER FETUS: CPT | Mod: S$GLB,,, | Performed by: OBSTETRICS & GYNECOLOGY

## 2020-10-14 ENCOUNTER — HOSPITAL ENCOUNTER (OUTPATIENT)
Dept: PERINATAL CARE | Facility: OTHER | Age: 35
Discharge: HOME | End: 2020-10-14
Attending: OBSTETRICS & GYNECOLOGY
Payer: COMMERCIAL

## 2020-10-14 ENCOUNTER — ROUTINE PRENATAL (OUTPATIENT)
Dept: OBSTETRICS AND GYNECOLOGY | Facility: CLINIC | Age: 35
End: 2020-10-14
Payer: COMMERCIAL

## 2020-10-14 VITALS — SYSTOLIC BLOOD PRESSURE: 126 MMHG | WEIGHT: 192.69 LBS | DIASTOLIC BLOOD PRESSURE: 80 MMHG | BODY MASS INDEX: 29.3 KG/M2

## 2020-10-14 DIAGNOSIS — Z34.83 ENCOUNTER FOR SUPERVISION OF OTHER NORMAL PREGNANCY IN THIRD TRIMESTER: Primary | ICD-10-CM

## 2020-10-14 DIAGNOSIS — O09.813 PREGNANCY RESULTING FROM IN VITRO FERTILIZATION IN THIRD TRIMESTER: ICD-10-CM

## 2020-10-14 PROCEDURE — 0502F SUBSEQUENT PRENATAL CARE: CPT | Mod: CPTII,S$GLB,, | Performed by: ADVANCED PRACTICE MIDWIFE

## 2020-10-14 PROCEDURE — 76819 PR US, OB, FETAL BIOPHYSICAL, W/O NST: ICD-10-PCS | Mod: 26,,, | Performed by: OBSTETRICS & GYNECOLOGY

## 2020-10-14 PROCEDURE — 76819 FETAL BIOPHYS PROFIL W/O NST: CPT

## 2020-10-14 PROCEDURE — 76819 FETAL BIOPHYS PROFIL W/O NST: CPT | Mod: 26,,, | Performed by: OBSTETRICS & GYNECOLOGY

## 2020-10-14 PROCEDURE — 99999 PR PBB SHADOW E&M-EST. PATIENT-LVL III: CPT | Mod: PBBFAC,,, | Performed by: ADVANCED PRACTICE MIDWIFE

## 2020-10-14 PROCEDURE — 99999 PR PBB SHADOW E&M-EST. PATIENT-LVL III: ICD-10-PCS | Mod: PBBFAC,,, | Performed by: ADVANCED PRACTICE MIDWIFE

## 2020-10-14 PROCEDURE — 0502F PR SUBSEQUENT PRENATAL CARE: ICD-10-PCS | Mod: CPTII,S$GLB,, | Performed by: ADVANCED PRACTICE MIDWIFE

## 2020-10-14 NOTE — PROGRESS NOTES
Reason for visit: Routine Prenatal Visit    HPI:   34 y.o., at 33w5d by Estimated Date of Delivery: 11/27/20    No complaints    ROS:  OBSTETRICS  - Contractions: reports some armando - Pineda  - Bleeding: denies  - Loss of fluid: denies  - Fetal movement: denies  GASTRO  - Nausea: reports good FM, reinforced BID  - Vomiting: none  NEURO  - Headache: none      Past medical, surgical, social, family, and OB history: Reviewed and updated in EMR.  Medications: Reviewed and updated in EMR.  Allergies: Adhesive   Pregnancy dating, labs, ultrasound reports, prenatal testing, and problem list: Reviewed and updated in EMR.    pregnancy Problems (from 05/13/20 to present)     Problem Noted Resolved    Rh negative state in antepartum period - rhogam 9/17 9/17/2020 by Lupe Reed MD No    Pregnancy resulting from in vitro fertilization in third trimester 9/17/2020 by Lupe Reed MD No    Hx of postpartum depression, currently pregnant 7/8/2020 by Rosalinda Ramirez CNM No    Pregnancy, supervision, high-risk/breast/pump/fmla/tdap/flu 5/13/2020 by Lupe Reed MD No    History of IUGR (intrauterine growth retardation) and stillbirth, currently pregnant 5/13/2020 by Lupe Reed MD No    History of gestational hypertension 5/13/2020 by Lupe Reed MD No            Vitals: /80   Wt 87.4 kg (192 lb 10.9 oz)   LMP 02/20/2020   BMI 29.30 kg/m²     Physical exam:  GENERAL: No acute distress, normal appearance  NECK: Supple, normal ROM  SKIN: No rashes  NEURO: Alert and oriented x3  PSYCH: Normal mood and affect  CARDIO: Trace bilateral LE edema  PULMONARY: Normal respiratory effort; no respiratory distress  ABD: Soft, gravid, nontender; no hernia or hepatosplenomegaly    Assessment and Plan:    Encounter for supervision of other normal pregnancy in third trimester        Hand held US with head L lower quadrant/ oblique lie- discussed option for version does not turn to vertex and pt desires to discuss with   White.   labor precautions given  Follow-up: 2 weeks

## 2020-10-21 ENCOUNTER — HOSPITAL ENCOUNTER (OUTPATIENT)
Dept: PERINATAL CARE | Facility: OTHER | Age: 35
Discharge: HOME | End: 2020-10-21
Attending: OBSTETRICS & GYNECOLOGY
Payer: COMMERCIAL

## 2020-10-21 DIAGNOSIS — O09.813 PREGNANCY RESULTING FROM IN VITRO FERTILIZATION IN THIRD TRIMESTER: ICD-10-CM

## 2020-10-21 PROCEDURE — 76815 OB US LIMITED FETUS(S): CPT

## 2020-10-21 PROCEDURE — 59025 FETAL NON-STRESS TEST: CPT | Mod: 26,,, | Performed by: OBSTETRICS & GYNECOLOGY

## 2020-10-21 PROCEDURE — 76815 PR  US,PREGNANT UTERUS,LIMITED, 1/> FETUSES: ICD-10-PCS | Mod: 26,,, | Performed by: OBSTETRICS & GYNECOLOGY

## 2020-10-21 PROCEDURE — 59025 FETAL NON-STRESS TEST: CPT

## 2020-10-21 PROCEDURE — 59025 PR FETAL 2N-STRESS TEST: ICD-10-PCS | Mod: 26,,, | Performed by: OBSTETRICS & GYNECOLOGY

## 2020-10-21 PROCEDURE — 76815 OB US LIMITED FETUS(S): CPT | Mod: 26,,, | Performed by: OBSTETRICS & GYNECOLOGY

## 2020-10-28 DIAGNOSIS — O09.813 PREGNANCY RESULTING FROM IN VITRO FERTILIZATION IN THIRD TRIMESTER: Primary | ICD-10-CM

## 2020-10-29 ENCOUNTER — ROUTINE PRENATAL (OUTPATIENT)
Dept: OBSTETRICS AND GYNECOLOGY | Facility: CLINIC | Age: 35
End: 2020-10-29
Attending: OBSTETRICS & GYNECOLOGY
Payer: COMMERCIAL

## 2020-10-29 ENCOUNTER — HOSPITAL ENCOUNTER (OUTPATIENT)
Dept: PERINATAL CARE | Facility: OTHER | Age: 35
Discharge: HOME | End: 2020-10-29
Attending: OBSTETRICS & GYNECOLOGY
Payer: COMMERCIAL

## 2020-10-29 ENCOUNTER — TELEPHONE (OUTPATIENT)
Dept: OBSTETRICS AND GYNECOLOGY | Facility: HOSPITAL | Age: 35
End: 2020-10-29

## 2020-10-29 VITALS
SYSTOLIC BLOOD PRESSURE: 124 MMHG | BODY MASS INDEX: 20.82 KG/M2 | WEIGHT: 136.88 LBS | DIASTOLIC BLOOD PRESSURE: 80 MMHG

## 2020-10-29 DIAGNOSIS — Z67.91 RH NEGATIVE STATE IN ANTEPARTUM PERIOD: ICD-10-CM

## 2020-10-29 DIAGNOSIS — O09.813 PREGNANCY RESULTING FROM IN VITRO FERTILIZATION IN THIRD TRIMESTER: ICD-10-CM

## 2020-10-29 DIAGNOSIS — O99.891 HX OF POSTPARTUM DEPRESSION, CURRENTLY PREGNANT: ICD-10-CM

## 2020-10-29 DIAGNOSIS — Z87.59 HISTORY OF GESTATIONAL HYPERTENSION: ICD-10-CM

## 2020-10-29 DIAGNOSIS — Z03.818 ENCOUNTER FOR OBSERVATION FOR SUSPECTED EXPOSURE TO OTHER BIOLOGICAL AGENTS RULED OUT: ICD-10-CM

## 2020-10-29 DIAGNOSIS — Z86.59 HX OF POSTPARTUM DEPRESSION, CURRENTLY PREGNANT: ICD-10-CM

## 2020-10-29 DIAGNOSIS — O09.93 SUPERVISION OF HIGH RISK PREGNANCY IN THIRD TRIMESTER: ICD-10-CM

## 2020-10-29 DIAGNOSIS — O26.899 RH NEGATIVE STATE IN ANTEPARTUM PERIOD: ICD-10-CM

## 2020-10-29 DIAGNOSIS — O09.299 HISTORY OF IUGR (INTRAUTERINE GROWTH RETARDATION) AND STILLBIRTH, CURRENTLY PREGNANT: ICD-10-CM

## 2020-10-29 PROCEDURE — 99999 PR PBB SHADOW E&M-EST. PATIENT-LVL IV: ICD-10-PCS | Mod: PBBFAC,,, | Performed by: OBSTETRICS & GYNECOLOGY

## 2020-10-29 PROCEDURE — 59025 FETAL NON-STRESS TEST: CPT

## 2020-10-29 PROCEDURE — 99999 PR PBB SHADOW E&M-EST. PATIENT-LVL IV: CPT | Mod: PBBFAC,,, | Performed by: OBSTETRICS & GYNECOLOGY

## 2020-10-29 PROCEDURE — 87081 CULTURE SCREEN ONLY: CPT

## 2020-10-29 PROCEDURE — 59025 FETAL NON-STRESS TEST: CPT | Mod: 26,,, | Performed by: OBSTETRICS & GYNECOLOGY

## 2020-10-29 PROCEDURE — 0502F PR SUBSEQUENT PRENATAL CARE: ICD-10-PCS | Mod: CPTII,S$GLB,, | Performed by: OBSTETRICS & GYNECOLOGY

## 2020-10-29 PROCEDURE — 76815 PR  US,PREGNANT UTERUS,LIMITED, 1/> FETUSES: ICD-10-PCS | Mod: 26,,, | Performed by: OBSTETRICS & GYNECOLOGY

## 2020-10-29 PROCEDURE — 76815 OB US LIMITED FETUS(S): CPT | Mod: 26,,, | Performed by: OBSTETRICS & GYNECOLOGY

## 2020-10-29 PROCEDURE — 0502F SUBSEQUENT PRENATAL CARE: CPT | Mod: CPTII,S$GLB,, | Performed by: OBSTETRICS & GYNECOLOGY

## 2020-10-29 PROCEDURE — 76815 OB US LIMITED FETUS(S): CPT

## 2020-10-29 PROCEDURE — 59025 PR FETAL 2N-STRESS TEST: ICD-10-PCS | Mod: 26,,, | Performed by: OBSTETRICS & GYNECOLOGY

## 2020-11-02 LAB — BACTERIA SPEC AEROBE CULT: NORMAL

## 2020-11-04 ENCOUNTER — PROCEDURE VISIT (OUTPATIENT)
Dept: OBSTETRICS AND GYNECOLOGY | Facility: CLINIC | Age: 35
End: 2020-11-04
Attending: OBSTETRICS & GYNECOLOGY
Payer: COMMERCIAL

## 2020-11-04 ENCOUNTER — PATIENT MESSAGE (OUTPATIENT)
Dept: OBSTETRICS AND GYNECOLOGY | Facility: OTHER | Age: 35
End: 2020-11-04

## 2020-11-04 ENCOUNTER — HOSPITAL ENCOUNTER (OUTPATIENT)
Dept: PERINATAL CARE | Facility: OTHER | Age: 35
Discharge: HOME | End: 2020-11-04
Attending: OBSTETRICS & GYNECOLOGY
Payer: COMMERCIAL

## 2020-11-04 VITALS
DIASTOLIC BLOOD PRESSURE: 80 MMHG | BODY MASS INDEX: 29.53 KG/M2 | SYSTOLIC BLOOD PRESSURE: 120 MMHG | WEIGHT: 194.25 LBS

## 2020-11-04 DIAGNOSIS — O09.299 HISTORY OF IUGR (INTRAUTERINE GROWTH RETARDATION) AND STILLBIRTH, CURRENTLY PREGNANT: ICD-10-CM

## 2020-11-04 DIAGNOSIS — Z87.59 HISTORY OF GESTATIONAL HYPERTENSION: ICD-10-CM

## 2020-11-04 DIAGNOSIS — O09.813 PREGNANCY RESULTING FROM IN VITRO FERTILIZATION IN THIRD TRIMESTER: ICD-10-CM

## 2020-11-04 DIAGNOSIS — O09.93 SUPERVISION OF HIGH RISK PREGNANCY IN THIRD TRIMESTER: ICD-10-CM

## 2020-11-04 DIAGNOSIS — Z67.91 RH NEGATIVE STATE IN ANTEPARTUM PERIOD: ICD-10-CM

## 2020-11-04 DIAGNOSIS — O99.891 HX OF POSTPARTUM DEPRESSION, CURRENTLY PREGNANT: ICD-10-CM

## 2020-11-04 DIAGNOSIS — O26.899 RH NEGATIVE STATE IN ANTEPARTUM PERIOD: ICD-10-CM

## 2020-11-04 DIAGNOSIS — Z86.59 HX OF POSTPARTUM DEPRESSION, CURRENTLY PREGNANT: ICD-10-CM

## 2020-11-04 LAB
ERYTHROCYTE [DISTWIDTH] IN BLOOD BY AUTOMATED COUNT: 13.3 % (ref 11.5–14.5)
HCT VFR BLD AUTO: 37.6 % (ref 37–48.5)
HGB BLD-MCNC: 11.8 G/DL (ref 12–16)
MCH RBC QN AUTO: 29.8 PG (ref 27–31)
MCHC RBC AUTO-ENTMCNC: 31.4 G/DL (ref 32–36)
MCV RBC AUTO: 95 FL (ref 82–98)
PLATELET # BLD AUTO: 217 K/UL (ref 150–350)
PMV BLD AUTO: 12.3 FL (ref 9.2–12.9)
RBC # BLD AUTO: 3.96 M/UL (ref 4–5.4)
WBC # BLD AUTO: 7.4 K/UL (ref 3.9–12.7)

## 2020-11-04 PROCEDURE — 76815 OB US LIMITED FETUS(S): CPT

## 2020-11-04 PROCEDURE — 59025 FETAL NON-STRESS TEST: CPT | Mod: 26,,, | Performed by: OBSTETRICS & GYNECOLOGY

## 2020-11-04 PROCEDURE — 99999 PR PBB SHADOW E&M-EST. PATIENT-LVL III: CPT | Mod: PBBFAC,,, | Performed by: OBSTETRICS & GYNECOLOGY

## 2020-11-04 PROCEDURE — 0502F SUBSEQUENT PRENATAL CARE: CPT | Mod: CPTII,S$GLB,, | Performed by: OBSTETRICS & GYNECOLOGY

## 2020-11-04 PROCEDURE — 76815 PR  US,PREGNANT UTERUS,LIMITED, 1/> FETUSES: ICD-10-PCS | Mod: 26,,, | Performed by: OBSTETRICS & GYNECOLOGY

## 2020-11-04 PROCEDURE — 76815 OB US LIMITED FETUS(S): CPT | Mod: 26,,, | Performed by: OBSTETRICS & GYNECOLOGY

## 2020-11-04 PROCEDURE — 0502F PR SUBSEQUENT PRENATAL CARE: ICD-10-PCS | Mod: CPTII,S$GLB,, | Performed by: OBSTETRICS & GYNECOLOGY

## 2020-11-04 PROCEDURE — 59025 PR FETAL 2N-STRESS TEST: ICD-10-PCS | Mod: 26,,, | Performed by: OBSTETRICS & GYNECOLOGY

## 2020-11-04 PROCEDURE — 86592 SYPHILIS TEST NON-TREP QUAL: CPT

## 2020-11-04 PROCEDURE — 59025 FETAL NON-STRESS TEST: CPT

## 2020-11-04 PROCEDURE — 86703 HIV-1/HIV-2 1 RESULT ANTBDY: CPT

## 2020-11-04 PROCEDURE — 99999 PR PBB SHADOW E&M-EST. PATIENT-LVL III: ICD-10-PCS | Mod: PBBFAC,,, | Performed by: OBSTETRICS & GYNECOLOGY

## 2020-11-04 PROCEDURE — 85027 COMPLETE CBC AUTOMATED: CPT

## 2020-11-04 NOTE — PROGRESS NOTES
Doind well. Denies complaints. Denies ctx, vb, lof. Version scheduled for this Friday for breech presentation.     F/u in 1 week    Negar Marcos M.D.   OB/GYN  PGY-2

## 2020-11-05 DIAGNOSIS — O09.93 SUPERVISION OF HIGH RISK PREGNANCY IN THIRD TRIMESTER: Primary | ICD-10-CM

## 2020-11-05 LAB
HIV 1+2 AB+HIV1 P24 AG SERPL QL IA: NEGATIVE
RPR SER QL: NORMAL

## 2020-11-09 DIAGNOSIS — O09.93 SUPERVISION OF HIGH RISK PREGNANCY IN THIRD TRIMESTER: Primary | ICD-10-CM

## 2020-11-12 ENCOUNTER — HOSPITAL ENCOUNTER (OUTPATIENT)
Dept: PERINATAL CARE | Facility: OTHER | Age: 35
Discharge: HOME | End: 2020-11-12
Attending: OBSTETRICS & GYNECOLOGY
Payer: COMMERCIAL

## 2020-11-12 ENCOUNTER — ROUTINE PRENATAL (OUTPATIENT)
Dept: OBSTETRICS AND GYNECOLOGY | Facility: CLINIC | Age: 35
End: 2020-11-12
Attending: OBSTETRICS & GYNECOLOGY
Payer: COMMERCIAL

## 2020-11-12 VITALS — WEIGHT: 195.31 LBS | DIASTOLIC BLOOD PRESSURE: 80 MMHG | SYSTOLIC BLOOD PRESSURE: 122 MMHG | BODY MASS INDEX: 29.7 KG/M2

## 2020-11-12 DIAGNOSIS — Z67.91 RH NEGATIVE STATE IN ANTEPARTUM PERIOD: ICD-10-CM

## 2020-11-12 DIAGNOSIS — O09.813 PREGNANCY RESULTING FROM IN VITRO FERTILIZATION IN THIRD TRIMESTER: ICD-10-CM

## 2020-11-12 DIAGNOSIS — Z87.59 HISTORY OF GESTATIONAL HYPERTENSION: ICD-10-CM

## 2020-11-12 DIAGNOSIS — Z01.818 PRE-OP EVALUATION: ICD-10-CM

## 2020-11-12 DIAGNOSIS — O99.891 HX OF POSTPARTUM DEPRESSION, CURRENTLY PREGNANT: ICD-10-CM

## 2020-11-12 DIAGNOSIS — Z86.59 HX OF POSTPARTUM DEPRESSION, CURRENTLY PREGNANT: ICD-10-CM

## 2020-11-12 DIAGNOSIS — O09.93 SUPERVISION OF HIGH RISK PREGNANCY IN THIRD TRIMESTER: Primary | ICD-10-CM

## 2020-11-12 DIAGNOSIS — O26.899 RH NEGATIVE STATE IN ANTEPARTUM PERIOD: ICD-10-CM

## 2020-11-12 DIAGNOSIS — O09.299 HISTORY OF IUGR (INTRAUTERINE GROWTH RETARDATION) AND STILLBIRTH, CURRENTLY PREGNANT: ICD-10-CM

## 2020-11-12 PROCEDURE — 59025 FETAL NON-STRESS TEST: CPT | Mod: 26,,, | Performed by: OBSTETRICS & GYNECOLOGY

## 2020-11-12 PROCEDURE — 76815 OB US LIMITED FETUS(S): CPT | Mod: 26,,, | Performed by: OBSTETRICS & GYNECOLOGY

## 2020-11-12 PROCEDURE — 76815 OB US LIMITED FETUS(S): CPT

## 2020-11-12 PROCEDURE — 99999 PR PBB SHADOW E&M-EST. PATIENT-LVL II: ICD-10-PCS | Mod: PBBFAC,,, | Performed by: OBSTETRICS & GYNECOLOGY

## 2020-11-12 PROCEDURE — 0502F PR SUBSEQUENT PRENATAL CARE: ICD-10-PCS | Mod: CPTII,S$GLB,, | Performed by: OBSTETRICS & GYNECOLOGY

## 2020-11-12 PROCEDURE — 99999 PR PBB SHADOW E&M-EST. PATIENT-LVL II: CPT | Mod: PBBFAC,,, | Performed by: OBSTETRICS & GYNECOLOGY

## 2020-11-12 PROCEDURE — 76815 PR  US,PREGNANT UTERUS,LIMITED, 1/> FETUSES: ICD-10-PCS | Mod: 26,,, | Performed by: OBSTETRICS & GYNECOLOGY

## 2020-11-12 PROCEDURE — 59025 FETAL NON-STRESS TEST: CPT

## 2020-11-12 PROCEDURE — 59025 PR FETAL 2N-STRESS TEST: ICD-10-PCS | Mod: 26,,, | Performed by: OBSTETRICS & GYNECOLOGY

## 2020-11-12 PROCEDURE — 0502F SUBSEQUENT PRENATAL CARE: CPT | Mod: CPTII,S$GLB,, | Performed by: OBSTETRICS & GYNECOLOGY

## 2020-11-12 NOTE — H&P (VIEW-ONLY)
HISTORY AND PHYSICAL                                                OBSTETRICS          Subjective:       Paula Armendariz is a 34 y.o.  female with IUP at 37w6d weeks gestation who presents for routine OB care. IOL schedule for  when pt will be 39w0d.    Patient denies contractions, denies vaginal bleeding, denies LOF.   Fetal Movement: normal.    This IUP is complicated by h/o IUGR, h/o gHTN, h/o PPD, Rh- status, and IVF pregnancy.    Review of Systems   Constitutional: Negative for chills and fever.   Respiratory: Negative for cough and shortness of breath.    Cardiovascular: Negative for chest pain and palpitations.   Gastrointestinal: Negative for abdominal pain, constipation, diarrhea, nausea and vomiting.   Genitourinary: Negative for dysuria, urgency, vaginal bleeding, vaginal discharge and vaginal pain.   Musculoskeletal: Negative for arthralgias.   Integumentary:  Negative for rash.   Neurological: Negative for syncope and headaches.       PMHx:   Past Medical History:   Diagnosis Date    PCOS (polycystic ovarian syndrome)        PSHx:   Past Surgical History:   Procedure Laterality Date    TONSILLECTOMY         All:   Review of patient's allergies indicates:   Allergen Reactions    Adhesive Rash       Meds: (Not in a hospital admission)      SH:   Social History     Socioeconomic History    Marital status:      Spouse name: Not on file    Number of children: Not on file    Years of education: Not on file    Highest education level: Not on file   Occupational History    Not on file   Social Needs    Financial resource strain: Not on file    Food insecurity     Worry: Not on file     Inability: Not on file    Transportation needs     Medical: Not on file     Non-medical: Not on file   Tobacco Use    Smoking status: Never Smoker    Smokeless tobacco: Never Used   Substance and Sexual Activity    Alcohol use: Not Currently     Comment: socially    Drug use: No     Sexual activity: Yes     Partners: Male   Lifestyle    Physical activity     Days per week: Not on file     Minutes per session: Not on file    Stress: Not on file   Relationships    Social connections     Talks on phone: Not on file     Gets together: Not on file     Attends [employer] service: Not on file     Active member of club or organization: Not on file     Attends meetings of clubs or organizations: Not on file     Relationship status: Not on file   Other Topics Concern    Not on file   Social History Narrative    Not on file       FH:   Family History   Problem Relation Age of Onset    Diabetes Maternal Grandmother     Diabetes Maternal Grandfather     Ovarian cancer Neg Hx     Eclampsia Neg Hx        OBHx:   OB History    Para Term  AB Living   3 1 1 0 1 1   SAB TAB Ectopic Multiple Live Births   0 0 0 0 1      # Outcome Date GA Lbr Jose Alejandro/2nd Weight Sex Delivery Anes PTL Lv   3 Current            2 Term 18 39w0d  2.23 kg (4 lb 14.7 oz) F Vag-Spont  N CHRISTOPHER      Name: MELISA LUI      Apgar1: 8  Apgar5: 9   1 AB                Objective:       /80   Wt 88.6 kg (195 lb 5.2 oz)   LMP 2020   BMI 29.70 kg/m²     Vitals:    20 0929   BP: 122/80   Weight: 88.6 kg (195 lb 5.2 oz)       General: NAD, alert, oriented, cooperative  HEENT: NCAT, EOM grossly intact  Lungs: Normal WOB  Heart: regular rate  Abdomen: gravid, soft, nondistended, nontender, no rebound or guarding  Extremities: no edema    Cervix: /60/-3    EFW by Leopold's: 6.5#    Maternal pelvis proven to 4 lbs 14 oz    Recent Growth Scan: 20% (1975g) at 33 wga    Lab Review  Blood Type O NEG  GBBS: negative  Rubella: Immune  RPR: NR  HIV: negative  HepB: negative       Assessment:       37w6d weeks gestation for routine OB care.    There are no hospital problems to display for this patient.       Plan:     1. IOL   Risks, benefits, alternatives and possible complications have been discussed in detail  with the patient.   - Consents signed and scanned into chart on 11/12  - Admit to Labor and Delivery unit  - Epidural per Anesthesia  - Draw CBC, T&S  - Notify Staff  - Recheck in 2 hrs or PRN  - Post-Partum Hemorrhage risk - low    2. H/o FGR  - Normal growth this pregnancy  - Recent Growth Scan: 20% (1975g) at 33 wga  - Fundal height at 37cm at 37 wga visit    3. H/o gHTN  - BP: (122)/(80)   - Asymptomatic at this time    4. H/o PPD  - will require 1 week mood check      Paula Louis MD/MPH  OB/GYN PGY1

## 2020-11-12 NOTE — PROGRESS NOTES
HISTORY AND PHYSICAL                                                OBSTETRICS          Subjective:       Paula Armendariz is a 34 y.o.  female with IUP at 37w6d weeks gestation who presents for routine OB care. IOL schedule for  when pt will be 39w0d.    Patient denies contractions, denies vaginal bleeding, denies LOF.   Fetal Movement: normal.    This IUP is complicated by h/o IUGR, h/o gHTN, h/o PPD, Rh- status, and IVF pregnancy.    Review of Systems   Constitutional: Negative for chills and fever.   Respiratory: Negative for cough and shortness of breath.    Cardiovascular: Negative for chest pain and palpitations.   Gastrointestinal: Negative for abdominal pain, constipation, diarrhea, nausea and vomiting.   Genitourinary: Negative for dysuria, urgency, vaginal bleeding, vaginal discharge and vaginal pain.   Musculoskeletal: Negative for arthralgias.   Integumentary:  Negative for rash.   Neurological: Negative for syncope and headaches.       PMHx:   Past Medical History:   Diagnosis Date    PCOS (polycystic ovarian syndrome)        PSHx:   Past Surgical History:   Procedure Laterality Date    TONSILLECTOMY         All:   Review of patient's allergies indicates:   Allergen Reactions    Adhesive Rash       Meds: (Not in a hospital admission)      SH:   Social History     Socioeconomic History    Marital status:      Spouse name: Not on file    Number of children: Not on file    Years of education: Not on file    Highest education level: Not on file   Occupational History    Not on file   Social Needs    Financial resource strain: Not on file    Food insecurity     Worry: Not on file     Inability: Not on file    Transportation needs     Medical: Not on file     Non-medical: Not on file   Tobacco Use    Smoking status: Never Smoker    Smokeless tobacco: Never Used   Substance and Sexual Activity    Alcohol use: Not Currently     Comment: socially    Drug use: No     Sexual activity: Yes     Partners: Male   Lifestyle    Physical activity     Days per week: Not on file     Minutes per session: Not on file    Stress: Not on file   Relationships    Social connections     Talks on phone: Not on file     Gets together: Not on file     Attends Denominational service: Not on file     Active member of club or organization: Not on file     Attends meetings of clubs or organizations: Not on file     Relationship status: Not on file   Other Topics Concern    Not on file   Social History Narrative    Not on file       FH:   Family History   Problem Relation Age of Onset    Diabetes Maternal Grandmother     Diabetes Maternal Grandfather     Ovarian cancer Neg Hx     Eclampsia Neg Hx        OBHx:   OB History    Para Term  AB Living   3 1 1 0 1 1   SAB TAB Ectopic Multiple Live Births   0 0 0 0 1      # Outcome Date GA Lbr Jose Alejandro/2nd Weight Sex Delivery Anes PTL Lv   3 Current            2 Term 18 39w0d  2.23 kg (4 lb 14.7 oz) F Vag-Spont  N CHRISTOPHER      Name: MELISA LUI      Apgar1: 8  Apgar5: 9   1 AB                Objective:       /80   Wt 88.6 kg (195 lb 5.2 oz)   LMP 2020   BMI 29.70 kg/m²     Vitals:    20 0929   BP: 122/80   Weight: 88.6 kg (195 lb 5.2 oz)       General: NAD, alert, oriented, cooperative  HEENT: NCAT, EOM grossly intact  Lungs: Normal WOB  Heart: regular rate  Abdomen: gravid, soft, nondistended, nontender, no rebound or guarding  Extremities: no edema    Cervix: /60/-3    EFW by Leopold's: 6.5#    Maternal pelvis proven to 4 lbs 14 oz    Recent Growth Scan: 20% (1975g) at 33 wga    Lab Review  Blood Type O NEG  GBBS: negative  Rubella: Immune  RPR: NR  HIV: negative  HepB: negative       Assessment:       37w6d weeks gestation for routine OB care.    There are no hospital problems to display for this patient.       Plan:     1. IOL   Risks, benefits, alternatives and possible complications have been discussed in detail  with the patient.   - Consents signed and scanned into chart on 11/12  - Admit to Labor and Delivery unit  - Epidural per Anesthesia  - Draw CBC, T&S  - Notify Staff  - Recheck in 2 hrs or PRN  - Post-Partum Hemorrhage risk - low    2. H/o FGR  - Normal growth this pregnancy  - Recent Growth Scan: 20% (1975g) at 33 wga  - Fundal height at 37cm at 37 wga visit    3. H/o gHTN  - BP: (122)/(80)   - Asymptomatic at this time    4. H/o PPD  - will require 1 week mood check      Paula Louis MD/MPH  OB/GYN PGY1

## 2020-11-17 ENCOUNTER — HOSPITAL ENCOUNTER (OUTPATIENT)
Dept: PREADMISSION TESTING | Facility: OTHER | Age: 35
Discharge: HOME | End: 2020-11-17
Attending: OBSTETRICS & GYNECOLOGY
Payer: COMMERCIAL

## 2020-11-17 DIAGNOSIS — Z01.818 PRE-OP EVALUATION: ICD-10-CM

## 2020-11-17 PROCEDURE — U0003 INFECTIOUS AGENT DETECTION BY NUCLEIC ACID (DNA OR RNA); SEVERE ACUTE RESPIRATORY SYNDROME CORONAVIRUS 2 (SARS-COV-2) (CORONAVIRUS DISEASE [COVID-19]), AMPLIFIED PROBE TECHNIQUE, MAKING USE OF HIGH THROUGHPUT TECHNOLOGIES AS DESCRIBED BY CMS-2020-01-R: HCPCS

## 2020-11-19 ENCOUNTER — HOSPITAL ENCOUNTER (OUTPATIENT)
Dept: PERINATAL CARE | Facility: OTHER | Age: 35
Discharge: HOME | End: 2020-11-19
Attending: OBSTETRICS & GYNECOLOGY
Payer: COMMERCIAL

## 2020-11-19 ENCOUNTER — ROUTINE PRENATAL (OUTPATIENT)
Dept: OBSTETRICS AND GYNECOLOGY | Facility: CLINIC | Age: 35
End: 2020-11-19
Attending: OBSTETRICS & GYNECOLOGY
Payer: COMMERCIAL

## 2020-11-19 VITALS
BODY MASS INDEX: 30.03 KG/M2 | DIASTOLIC BLOOD PRESSURE: 78 MMHG | SYSTOLIC BLOOD PRESSURE: 118 MMHG | WEIGHT: 197.56 LBS

## 2020-11-19 DIAGNOSIS — O09.813 PREGNANCY RESULTING FROM IN VITRO FERTILIZATION IN THIRD TRIMESTER: ICD-10-CM

## 2020-11-19 DIAGNOSIS — Z86.59 HX OF POSTPARTUM DEPRESSION, CURRENTLY PREGNANT: ICD-10-CM

## 2020-11-19 DIAGNOSIS — O09.299 HISTORY OF IUGR (INTRAUTERINE GROWTH RETARDATION) AND STILLBIRTH, CURRENTLY PREGNANT: ICD-10-CM

## 2020-11-19 DIAGNOSIS — Z67.91 RH NEGATIVE STATE IN ANTEPARTUM PERIOD: ICD-10-CM

## 2020-11-19 DIAGNOSIS — O99.891 HX OF POSTPARTUM DEPRESSION, CURRENTLY PREGNANT: ICD-10-CM

## 2020-11-19 DIAGNOSIS — Z87.59 HISTORY OF GESTATIONAL HYPERTENSION: ICD-10-CM

## 2020-11-19 DIAGNOSIS — O09.93 SUPERVISION OF HIGH RISK PREGNANCY IN THIRD TRIMESTER: Primary | ICD-10-CM

## 2020-11-19 DIAGNOSIS — O26.899 RH NEGATIVE STATE IN ANTEPARTUM PERIOD: ICD-10-CM

## 2020-11-19 LAB — SARS-COV-2 RNA RESP QL NAA+PROBE: NOT DETECTED

## 2020-11-19 PROCEDURE — 59025 FETAL NON-STRESS TEST: CPT

## 2020-11-19 PROCEDURE — 76815 OB US LIMITED FETUS(S): CPT | Mod: 26,,, | Performed by: OBSTETRICS & GYNECOLOGY

## 2020-11-19 PROCEDURE — 76815 PR  US,PREGNANT UTERUS,LIMITED, 1/> FETUSES: ICD-10-PCS | Mod: 26,,, | Performed by: OBSTETRICS & GYNECOLOGY

## 2020-11-19 PROCEDURE — 0502F PR SUBSEQUENT PRENATAL CARE: ICD-10-PCS | Mod: CPTII,S$GLB,, | Performed by: OBSTETRICS & GYNECOLOGY

## 2020-11-19 PROCEDURE — 76815 OB US LIMITED FETUS(S): CPT

## 2020-11-19 PROCEDURE — 99999 PR PBB SHADOW E&M-EST. PATIENT-LVL III: ICD-10-PCS | Mod: PBBFAC,,, | Performed by: OBSTETRICS & GYNECOLOGY

## 2020-11-19 PROCEDURE — 0502F SUBSEQUENT PRENATAL CARE: CPT | Mod: CPTII,S$GLB,, | Performed by: OBSTETRICS & GYNECOLOGY

## 2020-11-19 PROCEDURE — 99999 PR PBB SHADOW E&M-EST. PATIENT-LVL III: CPT | Mod: PBBFAC,,, | Performed by: OBSTETRICS & GYNECOLOGY

## 2020-11-19 PROCEDURE — 59025 PR FETAL 2N-STRESS TEST: ICD-10-PCS | Mod: 26,,, | Performed by: OBSTETRICS & GYNECOLOGY

## 2020-11-19 PROCEDURE — 59025 FETAL NON-STRESS TEST: CPT | Mod: 26,,, | Performed by: OBSTETRICS & GYNECOLOGY

## 2020-11-19 NOTE — PROGRESS NOTES
Please use Paula Louis's last progress note for H&P    Patient to be admitted to L&D for iol tonight.  Cervix 2-3/60/-3    Consents previously signed.    Radha Brasher MD  OBGYN PGY-2       [phone]

## 2020-11-20 ENCOUNTER — ANESTHESIA (OUTPATIENT)
Dept: OBSTETRICS AND GYNECOLOGY | Facility: OTHER | Age: 35
End: 2020-11-20
Payer: COMMERCIAL

## 2020-11-20 ENCOUNTER — ANESTHESIA EVENT (OUTPATIENT)
Dept: OBSTETRICS AND GYNECOLOGY | Facility: OTHER | Age: 35
End: 2020-11-20
Payer: COMMERCIAL

## 2020-11-20 ENCOUNTER — HOSPITAL ENCOUNTER (INPATIENT)
Facility: OTHER | Age: 35
LOS: 1 days | Discharge: HOME | End: 2020-11-21
Attending: OBSTETRICS & GYNECOLOGY | Admitting: STUDENT IN AN ORGANIZED HEALTH CARE EDUCATION/TRAINING PROGRAM
Payer: COMMERCIAL

## 2020-11-20 DIAGNOSIS — O09.93 SUPERVISION OF HIGH RISK PREGNANCY IN THIRD TRIMESTER: ICD-10-CM

## 2020-11-20 DIAGNOSIS — Z34.90 ENCOUNTER FOR INDUCTION OF LABOR: ICD-10-CM

## 2020-11-20 PROBLEM — O09.299 HISTORY OF IUGR (INTRAUTERINE GROWTH RETARDATION) AND STILLBIRTH, CURRENTLY PREGNANT: Status: RESOLVED | Noted: 2020-05-13 | Resolved: 2020-11-20

## 2020-11-20 LAB
ABO + RH BLD: NORMAL
BASOPHILS # BLD AUTO: 0.05 K/UL (ref 0–0.2)
BASOPHILS NFR BLD: 0.5 % (ref 0–1.9)
BLD GP AB SCN CELLS X3 SERPL QL: NORMAL
BLOOD GROUP ANTIBODIES SERPL: NORMAL
DIFFERENTIAL METHOD: ABNORMAL
EOSINOPHIL # BLD AUTO: 0.2 K/UL (ref 0–0.5)
EOSINOPHIL NFR BLD: 1.6 % (ref 0–8)
ERYTHROCYTE [DISTWIDTH] IN BLOOD BY AUTOMATED COUNT: 13.2 % (ref 11.5–14.5)
HCT VFR BLD AUTO: 36.6 % (ref 37–48.5)
HGB BLD-MCNC: 12 G/DL (ref 12–16)
IMM GRANULOCYTES # BLD AUTO: 0.04 K/UL (ref 0–0.04)
IMM GRANULOCYTES NFR BLD AUTO: 0.4 % (ref 0–0.5)
LYMPHOCYTES # BLD AUTO: 2 K/UL (ref 1–4.8)
LYMPHOCYTES NFR BLD: 19.8 % (ref 18–48)
MCH RBC QN AUTO: 29.3 PG (ref 27–31)
MCHC RBC AUTO-ENTMCNC: 32.8 G/DL (ref 32–36)
MCV RBC AUTO: 90 FL (ref 82–98)
MONOCYTES # BLD AUTO: 0.7 K/UL (ref 0.3–1)
MONOCYTES NFR BLD: 7 % (ref 4–15)
NEUTROPHILS # BLD AUTO: 7 K/UL (ref 1.8–7.7)
NEUTROPHILS NFR BLD: 70.7 % (ref 38–73)
NRBC BLD-RTO: 0 /100 WBC
PLATELET # BLD AUTO: 243 K/UL (ref 150–350)
PMV BLD AUTO: 11.8 FL (ref 9.2–12.9)
RBC # BLD AUTO: 4.09 M/UL (ref 4–5.4)
WBC # BLD AUTO: 9.89 K/UL (ref 3.9–12.7)

## 2020-11-20 PROCEDURE — 25000003 PHARM REV CODE 250: Performed by: STUDENT IN AN ORGANIZED HEALTH CARE EDUCATION/TRAINING PROGRAM

## 2020-11-20 PROCEDURE — 72100002 HC LABOR CARE, 1ST 8 HOURS

## 2020-11-20 PROCEDURE — 27200710 HC EPIDURAL INFUSION PUMP SET: Performed by: ANESTHESIOLOGY

## 2020-11-20 PROCEDURE — 59400 PR FULL ROUT OBSTE CARE,VAGINAL DELIV: ICD-10-PCS | Mod: GB,,, | Performed by: OBSTETRICS & GYNECOLOGY

## 2020-11-20 PROCEDURE — 63600175 PHARM REV CODE 636 W HCPCS: Performed by: STUDENT IN AN ORGANIZED HEALTH CARE EDUCATION/TRAINING PROGRAM

## 2020-11-20 PROCEDURE — 59400 OBSTETRICAL CARE: CPT | Mod: GB,,, | Performed by: OBSTETRICS & GYNECOLOGY

## 2020-11-20 PROCEDURE — 25000003 PHARM REV CODE 250: Performed by: OBSTETRICS & GYNECOLOGY

## 2020-11-20 PROCEDURE — 59409 PRA ETRICAL CARE,VAG DELIV ONLY: ICD-10-PCS | Mod: QY,,, | Performed by: ANESTHESIOLOGY

## 2020-11-20 PROCEDURE — 59409 OBSTETRICAL CARE: CPT | Mod: QY,,, | Performed by: ANESTHESIOLOGY

## 2020-11-20 PROCEDURE — 62326 NJX INTERLAMINAR LMBR/SAC: CPT | Performed by: STUDENT IN AN ORGANIZED HEALTH CARE EDUCATION/TRAINING PROGRAM

## 2020-11-20 PROCEDURE — 86901 BLOOD TYPING SEROLOGIC RH(D): CPT

## 2020-11-20 PROCEDURE — 86870 RBC ANTIBODY IDENTIFICATION: CPT

## 2020-11-20 PROCEDURE — 11000001 HC ACUTE MED/SURG PRIVATE ROOM

## 2020-11-20 PROCEDURE — 72200004 HC VAGINAL DELIVERY LEVEL I

## 2020-11-20 PROCEDURE — 85025 COMPLETE CBC W/AUTO DIFF WBC: CPT

## 2020-11-20 PROCEDURE — C1751 CATH, INF, PER/CENT/MIDLINE: HCPCS | Performed by: ANESTHESIOLOGY

## 2020-11-20 RX ORDER — BUPIVACAINE HYDROCHLORIDE 2.5 MG/ML
INJECTION, SOLUTION EPIDURAL; INFILTRATION; INTRACAUDAL
Status: DISCONTINUED | OUTPATIENT
Start: 2020-11-20 | End: 2020-11-20

## 2020-11-20 RX ORDER — LIDOCAINE HYDROCHLORIDE AND EPINEPHRINE 15; 5 MG/ML; UG/ML
INJECTION, SOLUTION EPIDURAL
Status: DISCONTINUED | OUTPATIENT
Start: 2020-11-20 | End: 2020-11-20

## 2020-11-20 RX ORDER — MISOPROSTOL 200 UG/1
TABLET ORAL
Status: DISPENSED
Start: 2020-11-20 | End: 2020-11-21

## 2020-11-20 RX ORDER — OXYTOCIN/RINGER'S LACTATE 30/500 ML
95 PLASTIC BAG, INJECTION (ML) INTRAVENOUS ONCE
Status: DISCONTINUED | OUTPATIENT
Start: 2020-11-20 | End: 2020-11-22 | Stop reason: HOSPADM

## 2020-11-20 RX ORDER — SERTRALINE HYDROCHLORIDE 50 MG/1
50 TABLET, FILM COATED ORAL DAILY
Status: DISCONTINUED | OUTPATIENT
Start: 2020-11-20 | End: 2020-11-22 | Stop reason: HOSPADM

## 2020-11-20 RX ORDER — BUPIVACAINE HYDROCHLORIDE 2.5 MG/ML
INJECTION, SOLUTION EPIDURAL; INFILTRATION; INTRACAUDAL
Status: COMPLETED
Start: 2020-11-20 | End: 2020-11-20

## 2020-11-20 RX ORDER — SODIUM CHLORIDE, SODIUM LACTATE, POTASSIUM CHLORIDE, CALCIUM CHLORIDE 600; 310; 30; 20 MG/100ML; MG/100ML; MG/100ML; MG/100ML
INJECTION, SOLUTION INTRAVENOUS CONTINUOUS
Status: DISCONTINUED | OUTPATIENT
Start: 2020-11-20 | End: 2020-11-20

## 2020-11-20 RX ORDER — SIMETHICONE 80 MG
1 TABLET,CHEWABLE ORAL 4 TIMES DAILY PRN
Status: DISCONTINUED | OUTPATIENT
Start: 2020-11-20 | End: 2020-11-20

## 2020-11-20 RX ORDER — FAMOTIDINE 10 MG/ML
20 INJECTION INTRAVENOUS ONCE
Status: DISCONTINUED | OUTPATIENT
Start: 2020-11-20 | End: 2020-11-20

## 2020-11-20 RX ORDER — SODIUM CITRATE AND CITRIC ACID MONOHYDRATE 334; 500 MG/5ML; MG/5ML
30 SOLUTION ORAL ONCE
Status: DISCONTINUED | OUTPATIENT
Start: 2020-11-20 | End: 2020-11-20

## 2020-11-20 RX ORDER — PRENATAL WITH FERROUS FUM AND FOLIC ACID 3080; 920; 120; 400; 22; 1.84; 3; 20; 10; 1; 12; 200; 27; 25; 2 [IU]/1; [IU]/1; MG/1; [IU]/1; MG/1; MG/1; MG/1; MG/1; MG/1; MG/1; UG/1; MG/1; MG/1; MG/1; MG/1
1 TABLET ORAL DAILY
Status: DISCONTINUED | OUTPATIENT
Start: 2020-11-21 | End: 2020-11-22 | Stop reason: HOSPADM

## 2020-11-20 RX ORDER — ONDANSETRON 8 MG/1
8 TABLET, ORALLY DISINTEGRATING ORAL EVERY 8 HOURS PRN
Status: DISCONTINUED | OUTPATIENT
Start: 2020-11-20 | End: 2020-11-22 | Stop reason: HOSPADM

## 2020-11-20 RX ORDER — OXYTOCIN/RINGER'S LACTATE 30/500 ML
2 PLASTIC BAG, INJECTION (ML) INTRAVENOUS CONTINUOUS
Status: DISCONTINUED | OUTPATIENT
Start: 2020-11-20 | End: 2020-11-20

## 2020-11-20 RX ORDER — FENTANYL/BUPIVACAINE/NS/PF 2MCG/ML-.1
PLASTIC BAG, INJECTION (ML) INJECTION CONTINUOUS PRN
Status: DISCONTINUED | OUTPATIENT
Start: 2020-11-20 | End: 2020-11-20

## 2020-11-20 RX ORDER — METHYLERGONOVINE MALEATE 0.2 MG/1
0.2 TABLET ORAL 4 TIMES DAILY
Status: DISCONTINUED | OUTPATIENT
Start: 2020-11-20 | End: 2020-11-22 | Stop reason: HOSPADM

## 2020-11-20 RX ORDER — OXYTOCIN/RINGER'S LACTATE 30/500 ML
334 PLASTIC BAG, INJECTION (ML) INTRAVENOUS ONCE
Status: DISCONTINUED | OUTPATIENT
Start: 2020-11-20 | End: 2020-11-20

## 2020-11-20 RX ORDER — HYDROCORTISONE 25 MG/G
CREAM TOPICAL 3 TIMES DAILY PRN
Status: DISCONTINUED | OUTPATIENT
Start: 2020-11-20 | End: 2020-11-22 | Stop reason: HOSPADM

## 2020-11-20 RX ORDER — SODIUM CHLORIDE 9 MG/ML
INJECTION, SOLUTION INTRAVENOUS
Status: DISCONTINUED | OUTPATIENT
Start: 2020-11-20 | End: 2020-11-20

## 2020-11-20 RX ORDER — IBUPROFEN 600 MG/1
600 TABLET ORAL EVERY 6 HOURS
Status: DISCONTINUED | OUTPATIENT
Start: 2020-11-21 | End: 2020-11-22 | Stop reason: HOSPADM

## 2020-11-20 RX ORDER — OXYTOCIN/RINGER'S LACTATE 30/500 ML
95 PLASTIC BAG, INJECTION (ML) INTRAVENOUS ONCE
Status: DISCONTINUED | OUTPATIENT
Start: 2020-11-20 | End: 2020-11-20

## 2020-11-20 RX ORDER — CALCIUM CARBONATE 200(500)MG
500 TABLET,CHEWABLE ORAL 3 TIMES DAILY PRN
Status: DISCONTINUED | OUTPATIENT
Start: 2020-11-20 | End: 2020-11-20

## 2020-11-20 RX ORDER — ACETAMINOPHEN 325 MG/1
650 TABLET ORAL EVERY 6 HOURS PRN
Status: DISCONTINUED | OUTPATIENT
Start: 2020-11-20 | End: 2020-11-22 | Stop reason: HOSPADM

## 2020-11-20 RX ORDER — DIPHENHYDRAMINE HCL 25 MG
25 CAPSULE ORAL EVERY 4 HOURS PRN
Status: DISCONTINUED | OUTPATIENT
Start: 2020-11-20 | End: 2020-11-22 | Stop reason: HOSPADM

## 2020-11-20 RX ORDER — FENTANYL CITRATE 50 UG/ML
INJECTION, SOLUTION INTRAMUSCULAR; INTRAVENOUS
Status: DISCONTINUED | OUTPATIENT
Start: 2020-11-20 | End: 2020-11-20

## 2020-11-20 RX ORDER — FENTANYL/BUPIVACAINE/NS/PF 2MCG/ML-.1
PLASTIC BAG, INJECTION (ML) INJECTION
Status: COMPLETED
Start: 2020-11-20 | End: 2020-11-20

## 2020-11-20 RX ORDER — ONDANSETRON 8 MG/1
8 TABLET, ORALLY DISINTEGRATING ORAL EVERY 8 HOURS PRN
Status: DISCONTINUED | OUTPATIENT
Start: 2020-11-20 | End: 2020-11-20

## 2020-11-20 RX ORDER — DOCUSATE SODIUM 100 MG/1
200 CAPSULE, LIQUID FILLED ORAL 2 TIMES DAILY PRN
Status: DISCONTINUED | OUTPATIENT
Start: 2020-11-20 | End: 2020-11-22 | Stop reason: HOSPADM

## 2020-11-20 RX ORDER — MISOPROSTOL 200 UG/1
200 TABLET ORAL ONCE
Status: COMPLETED | OUTPATIENT
Start: 2020-11-20 | End: 2020-11-20

## 2020-11-20 RX ORDER — HYDROCODONE BITARTRATE AND ACETAMINOPHEN 10; 325 MG/1; MG/1
1 TABLET ORAL EVERY 4 HOURS PRN
Status: DISCONTINUED | OUTPATIENT
Start: 2020-11-20 | End: 2020-11-22 | Stop reason: HOSPADM

## 2020-11-20 RX ORDER — DIPHENHYDRAMINE HYDROCHLORIDE 50 MG/ML
25 INJECTION INTRAMUSCULAR; INTRAVENOUS EVERY 4 HOURS PRN
Status: DISCONTINUED | OUTPATIENT
Start: 2020-11-20 | End: 2020-11-22 | Stop reason: HOSPADM

## 2020-11-20 RX ORDER — HYDROCODONE BITARTRATE AND ACETAMINOPHEN 5; 325 MG/1; MG/1
1 TABLET ORAL EVERY 4 HOURS PRN
Status: DISCONTINUED | OUTPATIENT
Start: 2020-11-20 | End: 2020-11-22 | Stop reason: HOSPADM

## 2020-11-20 RX ORDER — FENTANYL CITRATE 50 UG/ML
INJECTION, SOLUTION INTRAMUSCULAR; INTRAVENOUS
Status: COMPLETED
Start: 2020-11-20 | End: 2020-11-20

## 2020-11-20 RX ORDER — BUPIVACAINE HYDROCHLORIDE 2.5 MG/ML
INJECTION, SOLUTION EPIDURAL; INFILTRATION; INTRACAUDAL
Status: DISPENSED
Start: 2020-11-20 | End: 2020-11-21

## 2020-11-20 RX ORDER — FENTANYL/BUPIVACAINE/NS/PF 2MCG/ML-.1
PLASTIC BAG, INJECTION (ML) INJECTION CONTINUOUS
Status: DISCONTINUED | OUTPATIENT
Start: 2020-11-20 | End: 2020-11-20

## 2020-11-20 RX ORDER — SIMETHICONE 80 MG
1 TABLET,CHEWABLE ORAL EVERY 6 HOURS PRN
Status: DISCONTINUED | OUTPATIENT
Start: 2020-11-20 | End: 2020-11-22 | Stop reason: HOSPADM

## 2020-11-20 RX ADMIN — PROMETHAZINE HYDROCHLORIDE 12.5 MG: 25 INJECTION INTRAMUSCULAR; INTRAVENOUS at 10:11

## 2020-11-20 RX ADMIN — Medication 5 ML: at 06:11

## 2020-11-20 RX ADMIN — Medication 2 MILLI-UNITS/MIN: at 05:11

## 2020-11-20 RX ADMIN — FENTANYL CITRATE 100 MCG: 50 INJECTION, SOLUTION INTRAMUSCULAR; INTRAVENOUS at 02:11

## 2020-11-20 RX ADMIN — HYDROCODONE BITARTRATE AND ACETAMINOPHEN 1 TABLET: 5; 325 TABLET ORAL at 08:11

## 2020-11-20 RX ADMIN — SODIUM CHLORIDE, SODIUM LACTATE, POTASSIUM CHLORIDE, AND CALCIUM CHLORIDE: .6; .31; .03; .02 INJECTION, SOLUTION INTRAVENOUS at 01:11

## 2020-11-20 RX ADMIN — MISOPROSTOL 800 MCG: 200 TABLET ORAL at 03:11

## 2020-11-20 RX ADMIN — FENTANYL CITRATE 100 MCG: 50 INJECTION, SOLUTION INTRAMUSCULAR; INTRAVENOUS at 06:11

## 2020-11-20 RX ADMIN — SODIUM CHLORIDE, SODIUM LACTATE, POTASSIUM CHLORIDE, AND CALCIUM CHLORIDE: .6; .31; .03; .02 INJECTION, SOLUTION INTRAVENOUS at 05:11

## 2020-11-20 RX ADMIN — MISOPROSTOL 50 MCG: 100 TABLET ORAL at 01:11

## 2020-11-20 RX ADMIN — LIDOCAINE HYDROCHLORIDE,EPINEPHRINE BITARTRATE 3 ML: 15; .005 INJECTION, SOLUTION EPIDURAL; INFILTRATION; INTRACAUDAL; PERINEURAL at 06:11

## 2020-11-20 RX ADMIN — BUPIVACAINE HYDROCHLORIDE 10 ML: 2.5 INJECTION, SOLUTION EPIDURAL; INFILTRATION; INTRACAUDAL; PERINEURAL at 01:11

## 2020-11-20 RX ADMIN — Medication 10 ML/HR: at 06:11

## 2020-11-20 RX ADMIN — SODIUM CHLORIDE, SODIUM LACTATE, POTASSIUM CHLORIDE, AND CALCIUM CHLORIDE: .6; .31; .03; .02 INJECTION, SOLUTION INTRAVENOUS at 11:11

## 2020-11-20 RX ADMIN — FENTANYL CITRATE 100 MCG: 50 INJECTION, SOLUTION INTRAMUSCULAR; INTRAVENOUS at 01:11

## 2020-11-20 RX ADMIN — METHYLERGONOVINE 0.2 MG: 0.2 TABLET ORAL at 06:11

## 2020-11-20 NOTE — L&D DELIVERY NOTE
[facility]  Vaginal Delivery   Operative Note    SUMMARY     Normal spontaneous vaginal delivery of live infant after three maternal pushes, was placed on mothers abdomen for skin to skin and bulb suctioning performed.  Infant delivered position OA over intact perineum.  Nuchal cord: Yes, cord reduced following delivery.    Spontaneous delivery of placenta and IV pitocin given noting good uterine tone.  2nd degree laceration noted and repaired in normal fashion with 2-0 Vicryl. Right labial laceration repaired with two interrupted sutures with 3-0 Vicryl. After repair bleeding was noted and clots were removed from the lower uterine segment. Cytotec 800 mg given rectally. EBL was 1,000 mL.  Patient tolerated delivery well. Sponge needle and lap counted correctly x2.    Indications:  (spontaneous vaginal delivery)  Pregnancy complicated by:   Patient Active Problem List   Diagnosis    PCOS (polycystic ovarian syndrome)    Pregnancy, supervision, high-risk/breast/pump/fmla/tdap/flu    History of gestational hypertension    Hx of postpartum depression, currently pregnant    Rh negative state in antepartum period - rhogam     Pregnancy resulting from in vitro fertilization in third trimester     (spontaneous vaginal delivery)     Admitting GA: 39w0d    Delivery Information for Mateo Armendariz    Birth information:  YOB: 2019   Time of birth: 3:07 PM   Sex: male   Head Delivery Date/Time: 2020  3:06 PM   Delivery type: Vaginal, Spontaneous   Gestational Age: 39w0d    Delivery Providers    Delivering clinician: CHAPIS Anaya MD   Provider Role    Petra Reza RN     MD Yogesh Felix RN             Measurements    Weight: 2980 g  Length: 48.3 cm  Head circumference: 30.5 cm  Chest circumference: 30.5 cm         Apgars    Living status: Living  Apgars:  1 min.:  5 min.:  10 min.:  15 min.:  20 min.:    Skin color:  1  1       Heart  rate:  2  2       Reflex irritability:  2  2       Muscle tone:  2  2       Respiratory effort:  2  2       Total:  9  9       Apgars assigned by: TICO         Operative Delivery    Forceps attempted?: No  Vacuum extractor attempted?: No         Shoulder Dystocia    Shoulder dystocia present?: No           Presentation    Presentation: Vertex  Position: Left Occiput Anterior           Interventions/Resuscitation    Method: Bulb Suctioning, Tactile Stimulation       Cord    Vessels: 3 vessels  Complications: Nuchal  Nuchal Intervention: reduced  Nuchal Cord Description: tight nuchal cord  Number of Loops: 1  Delayed Cord Clamping?: Yes  Cord Clamped Date/Time: 2020  3:08 PM  Cord Blood Disposition: Sent with Baby  Gases Sent?: No  Stem Cell Collection (by MD): No       Placenta    Placenta delivery date/time: 2020 1510  Placenta removal: Spontaneous  Placenta appearance: Intact  Placenta disposition: discarded           Labor Events:       labor: No     Labor Onset Date/Time:         Dilation Complete Date/Time:         Start Pushing Date/Time:         Start Pushing Date/Time:       Rupture Date/Time: 20  1052         Rupture type:          Fluid Amount:       Fluid Color: Clear      Fluid Odor:       Membrane Status: ARM (Artificial Rupture)               steroids: None     Antibiotics given for GBS: No     Induction:       Indications for induction:        Augmentation:       Indications for augmentation:       Labor complications: None     Additional complications:          Cervical ripening:                     Delivery:      Episiotomy: None     Indication for Episiotomy:       Perineal Lacerations: 2nd Repaired:  Yes   Periurethral Laceration: right Repaired: Yes   Labial Laceration:   Repaired:     Sulcus Laceration:   Repaired:     Vaginal Laceration:   Repaired:     Cervical Laceration:   Repaired:     Repair suture:       Repair # of packets: 2     Last Value - EBL  - Nursing (mL):       Sum - EBL - Nursing (mL): 0     Last Value - EBL - Anesthesia (mL): 375      Calculated QBL (mL):       Vaginal Sweep Performed: Yes     Surgicount Correct: Yes       Other providers:       Anesthesia    Method: Epidural          Details (if applicable):  Trial of Labor      Categorization:      Priority:     Indications for :     Incision Type:       Additional  information:  Forceps:    Vacuum:    Breech:    Observed anomalies    Other (Comments):           Keagan Green M.D.  OB/GYN PGY-1

## 2020-11-20 NOTE — PROGRESS NOTES
Patient EBL during delivery 1,000 mL. Patient continued to bleed in first 2 hours post delivery, saturating 3 rocky-pads. Will weigh pads to determine QBL.     PE:  Uterine fundus firm.  SVE: no clots noted at lower uterine segment, uterus well clamped down    A/P:  Cytotec x1 given in delivery  Will do methergine series PO x 4 doses (no history of BP issues)  Will do QBL   CBC at 2100    Radha Brasher MD  OBGYN PGY-2

## 2020-11-20 NOTE — ANESTHESIA PREPROCEDURE EVALUATION
[facility]  Anesthesia Pre-Operative Evaluation         Patient Name: Paula Armendariz  YOB: 1985  MRN: [id number]    2020      Paula Armendariz is a 34 y.o. female  @ 39w0d who presents for IOL. History of childhood asthma with no rescue inhaler use as an adult. Prior  with epidural without complication.    OB History    Para Term  AB Living   3 1 1 0 1 1   SAB TAB Ectopic Multiple Live Births   0 0 0 0 1      # Outcome Date GA Lbr Jose Alejandro/2nd Weight Sex Delivery Anes PTL Lv   3 Current            2 Term 18 39w0d  2.23 kg (4 lb 14.7 oz) F Vag-Spont  N CHRISTOPHER   1 AB                Review of patient's allergies indicates:   Allergen Reactions    Adhesive Rash       Wt Readings from Last 1 Encounters:   20 0148 89.4 kg (197 lb)       BP Readings from Last 3 Encounters:   20 120/77   20 118/78   20 122/80       Patient Active Problem List   Diagnosis    PCOS (polycystic ovarian syndrome)    Pregnancy, supervision, high-risk/breast/pump/fmla/tdap/flu    History of gestational hypertension    Hx of postpartum depression, currently pregnant    Rh negative state in antepartum period - rhogam     Pregnancy resulting from in vitro fertilization in third trimester    Encounter for induction of labor       Past Surgical History:   Procedure Laterality Date    TONSILLECTOMY         Social History     Socioeconomic History    Marital status:      Spouse name: Not on file    Number of children: Not on file    Years of education: Not on file    Highest education level: Not on file   Occupational History    Not on file   Social Needs    Financial resource strain: Not on file    Food insecurity     Worry: Not on file     Inability: Not on file    Transportation needs     Medical: Not on file     Non-medical: Not on file   Tobacco Use    Smoking status: Never Smoker    Smokeless tobacco: Never Used   Substance  and Sexual Activity    Alcohol use: Not Currently     Comment: socially    Drug use: No    Sexual activity: Yes     Partners: Male   Lifestyle    Physical activity     Days per week: Not on file     Minutes per session: Not on file    Stress: Not on file   Relationships    Social connections     Talks on phone: Not on file     Gets together: Not on file     Attends Restoration service: Not on file     Active member of club or organization: Not on file     Attends meetings of clubs or organizations: Not on file     Relationship status: Not on file   Other Topics Concern    Not on file   Social History Narrative    Not on file         Chemistry        Component Value Date/Time     05/13/2020 1111    K 4.0 05/13/2020 1111     05/13/2020 1111    CO2 23 05/13/2020 1111    BUN 6 05/13/2020 1111    CREATININE 0.7 05/13/2020 1111    GLU 92 05/13/2020 1111        Component Value Date/Time    CALCIUM 9.2 05/13/2020 1111    ALKPHOS 56 05/13/2020 1111    AST 17 05/13/2020 1111    ALT 16 05/13/2020 1111    BILITOT 0.5 05/13/2020 1111    ESTGFRAFRICA >60.0 05/13/2020 1111    EGFRNONAA >60.0 05/13/2020 1111            Lab Results   Component Value Date    WBC 9.89 11/20/2020    HGB 12.0 11/20/2020    HCT 36.6 (L) 11/20/2020    MCV 90 11/20/2020     11/20/2020       No results for input(s): PT, INR, PROTIME, APTT in the last 72 hours.        Anesthesia Evaluation    I have reviewed the Patient Summary Reports.      I have reviewed the Medications.     Review of Systems  Anesthesia Hx:  No problems with previous Anesthesia  History of prior surgery of interest to airway management or planning: Denies Family Hx of Anesthesia complications.   Denies Personal Hx of Anesthesia complications.   Social:  Non-Smoker    Cardiovascular:   Hypertension    Pulmonary:   Asthma mild    Renal/:  Renal/ Normal     Hepatic/GI:  Hepatic/GI Normal    Musculoskeletal:  Musculoskeletal Normal    Neurological:  Neurology  Normal    Endocrine:  Endocrine Normal        Physical Exam  General:  Well nourished    Airway/Jaw/Neck:  Airway Findings: Mouth Opening: Normal Tongue: Normal  Mallampati: II  TM Distance: Normal, at least 6 cm  Jaw/Neck Findings:  Neck ROM: Normal ROM      Dental:  Dental Findings: In tact   Chest/Lungs:  Chest/Lungs Findings: Clear to auscultation, Normal Respiratory Rate     Heart/Vascular:  Heart Findings: Normal       Mental Status:  Mental Status Findings:  Cooperative, Alert and Oriented         Anesthesia Plan  Type of Anesthesia, risks & benefits discussed:  Anesthesia Type:  epidural, general, CSE, spinal  Patient's Preference:   Intra-op Monitoring Plan:   Intra-op Monitoring Plan Comments:   Post Op Pain Control Plan:   Post Op Pain Control Plan Comments:   Induction:    Beta Blocker:         Informed Consent: Patient understands risks and agrees with Anesthesia plan.  Questions answered. Anesthesia consent signed with patient.  ASA Score: 2     Day of Surgery Review of History & Physical:    H&P update referred to the surgeon.         Ready For Surgery From Anesthesia Perspective.

## 2020-11-20 NOTE — ANESTHESIA PROCEDURE NOTES
Epidural    Patient location during procedure: OB   Reason for block: primary anesthetic   Diagnosis: IUP   Start time: 11/20/2020 7:42 AM  Timeout: 11/20/2020 7:41 AM  End time: 11/20/2020 7:55 AM  Surgery related to: Vaginal Delivery    Staffing  Performing Provider: Dany Neri MD  Authorizing Provider: Kandis Hood MD        Preanesthetic Checklist  Completed: patient identified, site marked, surgical consent, pre-op evaluation, timeout performed, IV checked, risks and benefits discussed, monitors and equipment checked, anesthesia consent given, hand hygiene performed and patient being monitored  Preparation  Patient position: sitting  Prep: ChloraPrep  Patient monitoring: Pulse Ox  Epidural  Skin Anesthetic: lidocaine 1%  Skin Wheal: 3 mL  Administration type: continuous  Approach: midline  Interspace: L3-4    Injection technique: BREONNA saline  Needle and Epidural Catheter  Needle type: Tuohy   Needle gauge: 17  Needle length: 3.5 inches  Needle insertion depth: 6 cm  Catheter type: springwound  Catheter size: 19 G  Catheter at skin depth: 10 cm  Test dose: 3 mL of lidocaine 1.5% with Epi 1-to-200,000  Additional Documentation: incremental injection, negative aspiration for heme and CSF, no paresthesia on injection, no signs/symptoms of IV or SA injection, no significant pain on injection and no significant complaints from patient  Needle localization: anatomical landmarks  Medications:  Volume per aspiration: 5 mL  Time between aspirations: 5 minutes  Assessment  Ease of block: easy  Patient's tolerance of the procedure: comfortable throughout block and no complaintsNo inadvertent dural puncture with Tuohy.  Dural puncture performed with spinal needle.

## 2020-11-20 NOTE — PROGRESS NOTES
"LABOR NOTE    S:  Complaints: No.  Epidural working:  yes      O: /71   Pulse 91   Temp 98 °F (36.7 °C) (Oral)   Resp 18   Ht 5' 8" (1.727 m)   Wt 89.4 kg (197 lb)   LMP 2020   SpO2 100%   Breastfeeding No   BMI 29.95 kg/m²       FHT: 130, moderate variability, accels present, no decells, Category 1 - Reassuring  CTX: q 2-3 minutes  SVE: /-2      ASSESSMENT:   34 y.o.  at 39w0d, IOL    FHT reassuring    Active Hospital Problems    Diagnosis  POA    *Encounter for induction of labor [Z34.90]  Not Applicable    Pregnancy resulting from in vitro fertilization in third trimester [O09.813]  Not Applicable    Rh negative state in antepartum period - rhogam  [O26.899, Z67.91]  Yes    Hx of postpartum depression, currently pregnant [O99.891, Z86.59]  Not Applicable    Pregnancy, supervision, high-risk/breast/pump/fmla/tdap/flu [O09.90]  Not Applicable    History of gestational hypertension [Z87.59]  Not Applicable      Resolved Hospital Problems   No resolved problems to display.     0515: 3/60/-3, PIT  1100: /2, AROM CLR    PLAN:    Continue Close Maternal/Fetal Monitoring  Continue Pitocin Augmentation per protocol  Recheck 2-4 hours or PRN  IUPC if no change on next Check     Galo Anton M.D.  PGY-4 OB/GYN  [facility]    "

## 2020-11-20 NOTE — INTERVAL H&P NOTE
The patient has been examined and the H&P has been reviewed:    I concur with the findings and no changes have occurred since H&P was written.   Patient presents at 39w0d for scheduled IOL.     Active Hospital Problems    Diagnosis  POA    Pregnancy, supervision, high-risk/breast/pump/fmla/tdap/flu [O09.90]  Not Applicable      Resolved Hospital Problems   No resolved problems to display.

## 2020-11-20 NOTE — PROGRESS NOTES
"LABOR NOTE    S:  Complaints: No.  Epidural working:  yes      O: /71   Pulse 91   Temp 98 °F (36.7 °C) (Oral)   Resp 18   Ht 5' 8" (1.727 m)   Wt 89.4 kg (197 lb)   LMP 2020   SpO2 100%   Breastfeeding No   BMI 29.95 kg/m²       FHT: 130, moderate variability, accels present, + decell, Category 2 - Reassuring  CTX: q 2-3 minutes  SVE: /2       ASSESSMENT:   34 y.o.  at 39w0d, IOL    FHT reassuring    Active Hospital Problems    Diagnosis  POA    *Encounter for induction of labor [Z34.90]  Not Applicable    Pregnancy resulting from in vitro fertilization in third trimester [O09.813]  Not Applicable    Rh negative state in antepartum period - rhogam  [O26.899, Z67.91]  Yes    Hx of postpartum depression, currently pregnant [O99.891, Z86.59]  Not Applicable    Pregnancy, supervision, high-risk/breast/pump/fmla/tdap/flu [O09.90]  Not Applicable    History of gestational hypertension [Z87.59]  Not Applicable      Resolved Hospital Problems   No resolved problems to display.     0515: 3/60/-3, PIT  1100: /-2, AROM CLR  1200: /2    PLAN:    Continue Close Maternal/Fetal Monitoring  Continue Pitocin Augmentation per protocol  Recheck 2-4 hours or PRN  IUPC if no change on next Check     Katherine Boecking, M.D.  PGY-1 OB/GYN  [facility]    "

## 2020-11-20 NOTE — PLAN OF CARE
20 0737   OB SCREEN   Source of Information health record   Received Prenatal Care Yes   Is this a teen pregnancy No   Is the baby in NICU No   Indication for adoption/Safe Haven No   Indication for DME/post-acute needs No   Any congenital  disorders No   Fetal demise/ death No     This patient has been screened for Social Work discharge planning needs. Based on  documentation in medical record , pt will need a mood check within a week (postpartum depression).    Alberto Kruger, [facility]  NICU   Phone [phone] Ext. [phone]  [email or web address]

## 2020-11-21 VITALS
HEART RATE: 101 BPM | RESPIRATION RATE: 18 BRPM | BODY MASS INDEX: 29.86 KG/M2 | TEMPERATURE: 98 F | DIASTOLIC BLOOD PRESSURE: 73 MMHG | SYSTOLIC BLOOD PRESSURE: 119 MMHG | WEIGHT: 197 LBS | OXYGEN SATURATION: 96 % | HEIGHT: 68 IN

## 2020-11-21 LAB
BASOPHILS # BLD AUTO: 0.04 K/UL (ref 0–0.2)
BASOPHILS NFR BLD: 0.2 % (ref 0–1.9)
DIFFERENTIAL METHOD: ABNORMAL
EOSINOPHIL # BLD AUTO: 0.1 K/UL (ref 0–0.5)
EOSINOPHIL NFR BLD: 0.6 % (ref 0–8)
ERYTHROCYTE [DISTWIDTH] IN BLOOD BY AUTOMATED COUNT: 13.4 % (ref 11.5–14.5)
HCT VFR BLD AUTO: 32 % (ref 37–48.5)
HGB BLD-MCNC: 10.5 G/DL (ref 12–16)
IMM GRANULOCYTES # BLD AUTO: 0.11 K/UL (ref 0–0.04)
IMM GRANULOCYTES NFR BLD AUTO: 0.7 % (ref 0–0.5)
LYMPHOCYTES # BLD AUTO: 1.7 K/UL (ref 1–4.8)
LYMPHOCYTES NFR BLD: 10.3 % (ref 18–48)
MCH RBC QN AUTO: 30.3 PG (ref 27–31)
MCHC RBC AUTO-ENTMCNC: 32.8 G/DL (ref 32–36)
MCV RBC AUTO: 93 FL (ref 82–98)
MONOCYTES # BLD AUTO: 1.2 K/UL (ref 0.3–1)
MONOCYTES NFR BLD: 7.3 % (ref 4–15)
NEUTROPHILS # BLD AUTO: 13.5 K/UL (ref 1.8–7.7)
NEUTROPHILS NFR BLD: 80.9 % (ref 38–73)
NRBC BLD-RTO: 0 /100 WBC
PLATELET # BLD AUTO: 229 K/UL (ref 150–350)
PMV BLD AUTO: 11.8 FL (ref 9.2–12.9)
RBC # BLD AUTO: 3.46 M/UL (ref 4–5.4)
WBC # BLD AUTO: 16.63 K/UL (ref 3.9–12.7)

## 2020-11-21 PROCEDURE — 11000001 HC ACUTE MED/SURG PRIVATE ROOM

## 2020-11-21 PROCEDURE — 85025 COMPLETE CBC W/AUTO DIFF WBC: CPT

## 2020-11-21 PROCEDURE — 99024 POSTOP FOLLOW-UP VISIT: CPT | Mod: ,,, | Performed by: OBSTETRICS & GYNECOLOGY

## 2020-11-21 PROCEDURE — 99024 PR POST-OP FOLLOW-UP VISIT: ICD-10-PCS | Mod: ,,, | Performed by: OBSTETRICS & GYNECOLOGY

## 2020-11-21 PROCEDURE — 25000003 PHARM REV CODE 250: Performed by: STUDENT IN AN ORGANIZED HEALTH CARE EDUCATION/TRAINING PROGRAM

## 2020-11-21 PROCEDURE — 36415 COLL VENOUS BLD VENIPUNCTURE: CPT

## 2020-11-21 RX ORDER — IBUPROFEN 600 MG/1
600 TABLET ORAL EVERY 6 HOURS PRN
Qty: 30 TABLET | Refills: 0 | Status: SHIPPED | OUTPATIENT
Start: 2020-11-21 | End: 2021-01-06

## 2020-11-21 RX ADMIN — IBUPROFEN 600 MG: 600 TABLET, FILM COATED ORAL at 06:11

## 2020-11-21 RX ADMIN — METHYLERGONOVINE 0.2 MG: 0.2 TABLET ORAL at 12:11

## 2020-11-21 RX ADMIN — DOCUSATE SODIUM 200 MG: 100 CAPSULE, LIQUID FILLED ORAL at 01:11

## 2020-11-21 RX ADMIN — IBUPROFEN 600 MG: 600 TABLET, FILM COATED ORAL at 11:11

## 2020-11-21 RX ADMIN — IBUPROFEN 600 MG: 600 TABLET, FILM COATED ORAL at 05:11

## 2020-11-21 RX ADMIN — HYDROCODONE BITARTRATE AND ACETAMINOPHEN 1 TABLET: 10; 325 TABLET ORAL at 01:11

## 2020-11-21 RX ADMIN — DOCUSATE SODIUM 200 MG: 100 CAPSULE, LIQUID FILLED ORAL at 11:11

## 2020-11-21 RX ADMIN — METHYLERGONOVINE 0.2 MG: 0.2 TABLET ORAL at 06:11

## 2020-11-21 RX ADMIN — IBUPROFEN 600 MG: 600 TABLET, FILM COATED ORAL at 12:11

## 2020-11-21 RX ADMIN — PRENATAL VIT W/ FE FUMARATE-FA TAB 27-0.8 MG 1 TABLET: 27-0.8 TAB at 09:11

## 2020-11-21 RX ADMIN — HYDROCODONE BITARTRATE AND ACETAMINOPHEN 1 TABLET: 10; 325 TABLET ORAL at 06:11

## 2020-11-21 RX ADMIN — SERTRALINE HYDROCHLORIDE 50 MG: 50 TABLET ORAL at 09:11

## 2020-11-21 NOTE — PROGRESS NOTES
HTN education given with handout, pt states understanding and will follow up with walk in clinic on Friday 11/25 at 1020am

## 2020-11-21 NOTE — PROGRESS NOTES
POSTPARTUM PROGRESS NOTE     Paula Armendariz is a 34 y.o. female PPD #1 status post Spontaneous vaginal delivery at 39w0d in a pregnancy complicated by Rh neg, IVF preg, h/o gHTN. Patient is doing well this morning. She denies nausea, vomiting, fever or chills.  Patient reports mild abdominal pain that is adequately relieved by oral pain medications. Lochia is mild to moderate  and stable. Patient is voiding without difficulty and ambulating with no difficulty. She has passed flatus.  Patient does plan to breast feed. Condoms for contraception. She desires circumcision.     Objective:       Temp:  [97.6 °F (36.4 °C)-98.9 °F (37.2 °C)] 98.9 °F (37.2 °C)  Pulse:  [] 98  Resp:  [18] 18  SpO2:  [93 %-100 %] 99 %  BP: (105-148)/(51-96) 117/79    General:   alert, appears stated age and cooperative   Lungs:   Non-labored respirations    Heart:   regular rate and rhythm   Abdomen:  Soft, nondistended    Uterus:  firm located at the umblicus.    Extremities: no pedal edema noted     Lab Review  Recent Labs   Lab 20  0133 20  0343   WBC 9.89 16.63*   HGB 12.0 10.5*   HCT 36.6* 32.0*   MCV 90 93    229        No results for input(s): NA, K, CL, CO2, BUN, CREATININE, GLU, PROT, BILITOT, ALKPHOS, ALT, AST, MG, PHOS in the last 168 hours.       I/O    Intake/Output Summary (Last 24 hours) at 2020 0647  Last data filed at 2020 0000  Gross per 24 hour   Intake 2392.14 ml   Output 3075 ml   Net -682.86 ml        Assessment:     Patient Active Problem List   Diagnosis    PCOS (polycystic ovarian syndrome)    Pregnancy, supervision, high-risk/breast/pump/fmla/tdap/flu    History of gestational hypertension    Hx of postpartum depression, currently pregnant    Rh negative state in antepartum period - rhogam     Pregnancy resulting from in vitro fertilization in third trimester     (spontaneous vaginal delivery)        Plan:   1. Postpartum care:  - Patient doing well. Continue  routine management and advances.  - Continue PO pain meds. Pain well controlled.  - Heme: H/h 12/36 >>> 10.5/32  - Encourage ambulation  - Circumcision desired   - Contraception condoms, IVF pregnancy  - Lactation consult PRN    2. Rh neg  -Baby neg  -rho sapna not needed in postpartum period    3. Acute blood loss anemia  -EBL 1000 with continued bleeding  -cytotec x 1 given in delivery  -metergine series    Dispo: As patient meets milestones, will plan to discharge.    Radha Brasher MD  OBGYN PGY-2

## 2020-11-21 NOTE — ANESTHESIA POSTPROCEDURE EVALUATION
Anesthesia Post Evaluation    Patient: Paula Armendariz    Procedure(s) Performed: * No procedures listed *    Final Anesthesia Type: epidural    Patient location during evaluation: floor  Patient participation: Yes- Able to Participate  Level of consciousness: awake and alert  Post-procedure vital signs: reviewed and stable  Pain management: adequate  Airway patency: patent  LAVINIA mitigation strategies: Use of major conduction anesthesia (spinal/epidural) or peripheral nerve block and Multimodal analgesia  PONV status at discharge: No PONV  Anesthetic complications: no      Cardiovascular status: blood pressure returned to baseline  Respiratory status: unassisted, spontaneous ventilation and room air  Hydration status: euvolemic  Follow-up not needed.          Vitals Value Taken Time   /68 11/21/20 0843   Temp 37 °C (98.6 °F) 11/21/20 0843   Pulse 94 11/21/20 0843   Resp 18 11/21/20 0843   SpO2 96 % 11/21/20 0843         No case tracking events are documented in the log.      Pain/Jeb Score: Pain Rating Prior to Med Admin: 5 (11/21/2020 11:36 AM)  Pain Rating Post Med Admin: 4 (11/21/2020  2:10 AM)

## 2020-11-21 NOTE — DISCHARGE INSTRUCTIONS
Breastfeeding Discharge Instructions       Feed the baby at the earliest sign of hunger or comfort  o Hands to mouth, sucking motions  o Rooting or searching for something to suck on  o Dont wait for crying - it is a sign of distress     The feedings may be 8-12 times per 24hrs and will not follow a schedule   Avoid pacifiers and bottles for the first 4 weeks   Alternate the breast you start the feeding with, or start with the breast that feels the fullest   Switch breasts when the baby takes himself off the breast or falls asleep   Keep offering breasts until the baby looks full, no longer gives hunger signs, and stays asleep when placed on his back in the crib   If the baby is sleepy and wont wake for a feeding, put the baby skin-to-skin dressed in a diaper against the mothers bare chest   Sleep near your baby   The baby should be positioned and latched on to the breast correctly  o Chest-to-chest, chin in the breast  o Babys lips are flipped outward  o Babys mouth is stretched open wide like a shout  o Babys sucking should feel like tugging to the mother  - The baby should be drinking at the breast:  o You should hear swallowing or gulping throughout the feeding  o You should see milk on the babys lips when he comes off the breast  o Your breasts should be softer when the baby is finished feeding  o The baby should look relaxed at the end of feedings  o After the 4th day and your milk is in:  o The babys poop should turn bright yellow and be loose, watery, and seedy  o The baby should have at least 3-4 poops the size of the palm of your hand per day  o The baby should have at least 5-6 wet diapers per day  o The urine should be light yellow in color  You should drink when you are thirsty and eat a healthy diet when you are    hungry.     Take naps to get the rest you need.   Take medications and/or drink alcohol only with permission of your obstetrician    or the babys pediatrician.  You can  also call the Infant Risk Center,   ([phone]), Monday-Friday, 8am-5pm Central time, to get the most   up-to-date evidence-based information on the use of medications during   pregnancy and breastfeeding.      The baby should be examined by a pediatrician at 3-5 days of age.  Once your   milk comes in, the baby should be gaining at least ½ - 1oz each day and should be back to birthweight no later than 10-14 days of age.          Community Resources    [facility] Breastfeeding Warmline: [phone]   Local [facility] clinics: provide incentives and breastpumps to eligible mothers  La Leche [employer] (LLLI):  mother-to-mother support group website        www.[employer]Asthmat[employer]  Local La Leche League mother-to-mother support groups:        www.[employer]        La Leche League [facility]   Dr. Andrew Shea website for latch videos and general information:        www.breastfeedinginc.ca  Infant Risk Center is a call center that provides information about the safety of taking medications while breastfeeding.  Call 1-574.908.7356, M-F, 8am-5pm, CT.  International Lactation Consultant Association provides resources for assistance:        www.ilca.org  [place] Breastfeeding Coalition provides informationand resources for parents  and the community    www.LaBreastfeedingSupport.org     Gricelda Person is a mom-to-mom support group:                             www.[employer].com//breastfeedng-support/  Partners for Healthy Babies:  1-862-884-BABY(4365)  C[employer]ait: a breastfeeding support group for women of color, 937.128.1164

## 2020-11-22 NOTE — PLAN OF CARE
Lactation note:  The mother is expected to be discharged home today. Using the breastfeeding guide, breastfeeding information for discharge was reviewed as well first few days as this is mom's first visit with LC, including sore nipples and breast engorgement. Offered to assist with breastfeeding at next feeding. Mom independent with nursing and infant nursing effectively. The mother will continue to feed her baby with cues and at least 8 times in 24 hours. The feeding plan for baby at home was reviewed. The patient was taught how to perform hand expression and she has a breast pump  from insurance. The lactation phone number is on the board to call for further needs.  Additional resources were placed on her AVS to be given at discharge.

## 2020-11-22 NOTE — DISCHARGE SUMMARY
Delivery Discharge Summary  Obstetrics      Primary OB Clinician: Lupe Reed MD     Admission date: 2020  Discharge date: 2020    Disposition: To home, self care    Discharge Diagnosis List:      Patient Active Problem List   Diagnosis    PCOS (polycystic ovarian syndrome)    Pregnancy, supervision, high-risk/breast/pump/fmla/tdap/flu    History of gestational hypertension    Hx of postpartum depression, currently pregnant    Rh negative state in antepartum period - rhogam     Pregnancy resulting from in vitro fertilization in third trimester     (spontaneous vaginal delivery)       Procedure:     Hospital Course:  Paula Armendariz is a 34 y.o. now , PPD #1 who was admitted on 2020 at 39w0d for IOL. Patient was subsequently admitted to labor and delivery unit with signed consents.     Labor course was uncomplicated and resulted in  that was complicated by PPH (EBL 1000). Patient was given cytotec KS and placed on a methergine series.     Please see delivery note for further details. Her postpartum course was uncomplicated. On discharge day, patient's pain is controlled with oral pain medications. Pt is tolerating ambulation without SOB or CP, and regular diet without N/V. Reports lochia is mild. Denies any HA, vision changes, F/C, LE swelling. Denies any breast pain/soreness.    Pt in stable condition and ready for discharge. She has been instructed to start and/or continue medications and follow up with her obstetrics provider as listed below.    Pertinent studies:  CBC  Recent Labs   Lab 20  0133 20  0343   WBC 9.89 16.63*   HGB 12.0 10.5*   HCT 36.6* 32.0*   MCV 90 93    229          Immunization History   Administered Date(s) Administered    Influenza - Quadrivalent - PF *Preferred* (6 months and older) 2020    Rho (D) Immune Globulin 2017, 2020    Tdap 2017, 2020        Delivery:    Episiotomy: None    Lacerations: 2nd   Repair suture:     Repair # of packets: 2   Blood loss (ml):       Birth information:  YOB: 2019   Time of birth: 3:07 PM   Sex: male   Delivery type: Vaginal, Spontaneous   Gestational Age: 39w0d    Delivery Clinician:      Other providers:       Additional  information:  Forceps:    Vacuum:    Breech:    Observed anomalies      Living?:           APGARS  One minute Five minutes Ten minutes   Skin color:         Heart rate:         Grimace:         Muscle tone:         Breathing:         Totals: 9  9        Placenta: Delivered:       appearance      Patient Instructions:   Current Discharge Medication List      START taking these medications    Details   ibuprofen (ADVIL,MOTRIN) 600 MG tablet Take 1 tablet (600 mg total) by mouth every 6 (six) hours as needed.  Qty: 30 tablet, Refills: 0         CONTINUE these medications which have NOT CHANGED    Details   aspirin 81 MG Chew Take 1 tablet (81 mg total) by mouth once daily.    Associated Diagnoses: History of gestational hypertension      diphenhydrAMINE (BENADRYL) 50 MG capsule Take 50 mg by mouth every 6 (six) hours as needed for Itching.      sertraline (ZOLOFT) 50 MG tablet Take 1 tablet (50 mg total) by mouth once daily.  Qty: 30 tablet, Refills: 12      prenatal 25/iron fum/folic/dha (PRENATAL-1 ORAL) Take by mouth.             Discharge Procedure Orders   Diet Adult Regular     Pelvic Rest   Order Comments: Pelvic rest until follow up visit. Nothing in vagina -no sex, tampons, douching, etc.     Notify your health care provider if you experience any of the following:   Order Comments: Heavy vaginal bleeding saturating more than 1 pad per hr for at least consecutive 2 hrs.     Notify your health care provider if you experience any of the following:  increased confusion or weakness     Notify your health care provider if you experience any of the following:  persistent dizziness, light-headedness, or visual disturbances      Notify your health care provider if you experience any of the following:  worsening rash     Notify your health care provider if you experience any of the following:  severe persistent headache     Notify your health care provider if you experience any of the following:  difficulty breathing or increased cough     Notify your health care provider if you experience any of the following:  severe uncontrolled pain     Notify your health care provider if you experience any of the following:  persistent nausea and vomiting or diarrhea     Notify your health care provider if you experience any of the following:  temperature >100.4       Follow-up Information     Lupe Reed MD. Schedule an appointment as soon as possible for a visit in 2 weeks.    Specialties: Obstetrics, Obstetrics and Gynecology  Why: Postpartum checkup  Contact information:  [address] [id number] [phone]             Lupe Reed MD. Schedule an appointment as soon as possible for a visit in 6 weeks.    Specialties: Obstetrics, Obstetrics and Gynecology  Why: Postpartum visit  Contact information:  [address] [facility] [address]  [facility] [address]  [phone]                    Nadya Moody MD  OBGYN, PGY-3    I have reviewed and concur with the resident's history, physical assessment and plan.    Ella Calle MD  Obstetrics and Gynecology  [facility]

## 2020-11-22 NOTE — LACTATION NOTE
11/21/20 1700   Maternal Assessment   Breast Shape Bilateral:;round   Breast Density Bilateral:;soft   Areola Bilateral:;elastic   Nipples Bilateral:;everted   Maternal Infant Feeding   Infant Positioning cross-cradle   Signs of Milk Transfer audible swallow;infant jaw motion present   Pain with Feeding no   Nipple Shape After Feeding, Left round   Latch Assistance no   Lactation note:  The infant is expected to be discharged home today. The infant has had more than adequate  voids and stools in last 24 hours. Using the breastfeeding guide, the family was given breastfeeding information for the first few days of life and for discharge home. The family will continue to feed infant with cues 8 or more times in 24 hours until content. Mom has been independent with nursing. HUNTER observed feeding and that infant was nursing effectively.The feeding plan for home was reviewed. The mother has a breast pump from insurance. The mother is aware of resources for breastfeeding assistance at home in her breastfeeding guide and on AVS. HUNTER phone number on board provided for further needs.

## 2020-11-25 ENCOUNTER — PATIENT MESSAGE (OUTPATIENT)
Dept: OBSTETRICS AND GYNECOLOGY | Facility: CLINIC | Age: 35
End: 2020-11-25

## 2020-12-07 ENCOUNTER — TELEPHONE (OUTPATIENT)
Dept: OBSTETRICS AND GYNECOLOGY | Facility: CLINIC | Age: 35
End: 2020-12-07

## 2020-12-07 ENCOUNTER — PATIENT MESSAGE (OUTPATIENT)
Dept: OBSTETRICS AND GYNECOLOGY | Facility: CLINIC | Age: 35
End: 2020-12-07

## 2020-12-07 NOTE — TELEPHONE ENCOUNTER
Called pt to schedule 2 week PP appt. No answer.  Left VM message. Sent [employer] message as well.

## 2021-01-06 ENCOUNTER — OFFICE VISIT (OUTPATIENT)
Dept: OBSTETRICS AND GYNECOLOGY | Facility: CLINIC | Age: 36
End: 2021-01-06
Attending: OBSTETRICS & GYNECOLOGY
Payer: COMMERCIAL

## 2021-01-06 VITALS
DIASTOLIC BLOOD PRESSURE: 78 MMHG | BODY MASS INDEX: 27.62 KG/M2 | WEIGHT: 181.69 LBS | SYSTOLIC BLOOD PRESSURE: 110 MMHG

## 2021-01-06 PROBLEM — O09.90 PREGNANCY, SUPERVISION, HIGH-RISK: Status: RESOLVED | Noted: 2020-05-13 | Resolved: 2021-01-06

## 2021-01-06 PROBLEM — Z67.91 RH NEGATIVE STATE IN ANTEPARTUM PERIOD: Status: RESOLVED | Noted: 2020-09-17 | Resolved: 2021-01-06

## 2021-01-06 PROBLEM — Z86.59 HX OF POSTPARTUM DEPRESSION, CURRENTLY PREGNANT: Status: RESOLVED | Noted: 2020-07-08 | Resolved: 2021-01-06

## 2021-01-06 PROBLEM — Z87.59 HISTORY OF GESTATIONAL HYPERTENSION: Status: RESOLVED | Noted: 2020-05-13 | Resolved: 2021-01-06

## 2021-01-06 PROBLEM — O26.899 RH NEGATIVE STATE IN ANTEPARTUM PERIOD: Status: RESOLVED | Noted: 2020-09-17 | Resolved: 2021-01-06

## 2021-01-06 PROBLEM — O99.891 HX OF POSTPARTUM DEPRESSION, CURRENTLY PREGNANT: Status: RESOLVED | Noted: 2020-07-08 | Resolved: 2021-01-06

## 2021-01-06 PROBLEM — O09.813 PREGNANCY RESULTING FROM IN VITRO FERTILIZATION IN THIRD TRIMESTER: Status: RESOLVED | Noted: 2020-09-17 | Resolved: 2021-01-06

## 2021-01-06 PROCEDURE — 0503F PR POSTPARTUM CARE VISIT: ICD-10-PCS | Mod: S$GLB,,, | Performed by: OBSTETRICS & GYNECOLOGY

## 2021-01-06 PROCEDURE — 1126F AMNT PAIN NOTED NONE PRSNT: CPT | Mod: S$GLB,,, | Performed by: OBSTETRICS & GYNECOLOGY

## 2021-01-06 PROCEDURE — 99999 PR PBB SHADOW E&M-EST. PATIENT-LVL II: ICD-10-PCS | Mod: PBBFAC,,, | Performed by: OBSTETRICS & GYNECOLOGY

## 2021-01-06 PROCEDURE — 0503F POSTPARTUM CARE VISIT: CPT | Mod: S$GLB,,, | Performed by: OBSTETRICS & GYNECOLOGY

## 2021-01-06 PROCEDURE — 1126F PR PAIN SEVERITY QUANTIFIED, NO PAIN PRESENT: ICD-10-PCS | Mod: S$GLB,,, | Performed by: OBSTETRICS & GYNECOLOGY

## 2021-01-06 PROCEDURE — 3008F BODY MASS INDEX DOCD: CPT | Mod: CPTII,S$GLB,, | Performed by: OBSTETRICS & GYNECOLOGY

## 2021-01-06 PROCEDURE — 99999 PR PBB SHADOW E&M-EST. PATIENT-LVL II: CPT | Mod: PBBFAC,,, | Performed by: OBSTETRICS & GYNECOLOGY

## 2021-01-06 PROCEDURE — 3008F PR BODY MASS INDEX (BMI) DOCUMENTED: ICD-10-PCS | Mod: CPTII,S$GLB,, | Performed by: OBSTETRICS & GYNECOLOGY

## 2021-03-04 ENCOUNTER — OFFICE VISIT (OUTPATIENT)
Dept: URGENT CARE | Facility: CLINIC | Age: 36
End: 2021-03-04
Payer: COMMERCIAL

## 2021-03-04 VITALS
HEART RATE: 88 BPM | SYSTOLIC BLOOD PRESSURE: 120 MMHG | TEMPERATURE: 98 F | HEIGHT: 68 IN | BODY MASS INDEX: 27.43 KG/M2 | DIASTOLIC BLOOD PRESSURE: 86 MMHG | WEIGHT: 181 LBS | OXYGEN SATURATION: 98 %

## 2021-03-04 DIAGNOSIS — J32.9 BACTERIAL SINUSITIS: Primary | ICD-10-CM

## 2021-03-04 DIAGNOSIS — B96.89 BACTERIAL SINUSITIS: Primary | ICD-10-CM

## 2021-03-04 DIAGNOSIS — H92.09 OTALGIA, UNSPECIFIED LATERALITY: ICD-10-CM

## 2021-03-04 PROCEDURE — 3008F PR BODY MASS INDEX (BMI) DOCUMENTED: ICD-10-PCS | Mod: CPTII,S$GLB,, | Performed by: NURSE PRACTITIONER

## 2021-03-04 PROCEDURE — 99213 OFFICE O/P EST LOW 20 MIN: CPT | Mod: S$GLB,,, | Performed by: NURSE PRACTITIONER

## 2021-03-04 PROCEDURE — 99213 PR OFFICE/OUTPT VISIT, EST, LEVL III, 20-29 MIN: ICD-10-PCS | Mod: S$GLB,,, | Performed by: NURSE PRACTITIONER

## 2021-03-04 PROCEDURE — 3008F BODY MASS INDEX DOCD: CPT | Mod: CPTII,S$GLB,, | Performed by: NURSE PRACTITIONER

## 2021-03-04 RX ORDER — AMOXICILLIN AND CLAVULANATE POTASSIUM 875; 125 MG/1; MG/1
1 TABLET, FILM COATED ORAL 2 TIMES DAILY
Qty: 20 TABLET | Refills: 0 | Status: SHIPPED | OUTPATIENT
Start: 2021-03-04 | End: 2021-03-14

## 2021-06-08 ENCOUNTER — PATIENT MESSAGE (OUTPATIENT)
Dept: OBSTETRICS AND GYNECOLOGY | Facility: CLINIC | Age: 36
End: 2021-06-08

## 2022-02-15 ENCOUNTER — OFFICE VISIT (OUTPATIENT)
Dept: OBSTETRICS AND GYNECOLOGY | Facility: CLINIC | Age: 37
End: 2022-02-15
Attending: OBSTETRICS & GYNECOLOGY
Payer: COMMERCIAL

## 2022-02-15 ENCOUNTER — LAB VISIT (OUTPATIENT)
Dept: LAB | Facility: OTHER | Age: 37
End: 2022-02-15
Attending: OBSTETRICS & GYNECOLOGY
Payer: COMMERCIAL

## 2022-02-15 VITALS
HEIGHT: 69 IN | BODY MASS INDEX: 27.75 KG/M2 | SYSTOLIC BLOOD PRESSURE: 122 MMHG | WEIGHT: 187.38 LBS | DIASTOLIC BLOOD PRESSURE: 78 MMHG

## 2022-02-15 DIAGNOSIS — Z32.00 POSSIBLE PREGNANCY: ICD-10-CM

## 2022-02-15 DIAGNOSIS — Z32.01 POSITIVE PREGNANCY TEST: ICD-10-CM

## 2022-02-15 DIAGNOSIS — Z01.419 WELL WOMAN EXAM WITH ROUTINE GYNECOLOGICAL EXAM: Primary | ICD-10-CM

## 2022-02-15 PROBLEM — Z67.91 RH NEGATIVE STATE IN ANTEPARTUM PERIOD: Status: ACTIVE | Noted: 2022-02-15

## 2022-02-15 PROBLEM — O26.899 RH NEGATIVE STATE IN ANTEPARTUM PERIOD: Status: ACTIVE | Noted: 2022-02-15

## 2022-02-15 LAB
ABO + RH BLD: NORMAL
ALBUMIN SERPL BCP-MCNC: 4.3 G/DL (ref 3.5–5.2)
ALP SERPL-CCNC: 68 U/L (ref 55–135)
ALT SERPL W/O P-5'-P-CCNC: 16 U/L (ref 10–44)
ANION GAP SERPL CALC-SCNC: 10 MMOL/L (ref 8–16)
AST SERPL-CCNC: 17 U/L (ref 10–40)
B-HCG UR QL: POSITIVE
BASOPHILS # BLD AUTO: 0.06 K/UL (ref 0–0.2)
BASOPHILS NFR BLD: 0.9 % (ref 0–1.9)
BILIRUB SERPL-MCNC: 0.5 MG/DL (ref 0.1–1)
BLD GP AB SCN CELLS X3 SERPL QL: NORMAL
BUN SERPL-MCNC: 8 MG/DL (ref 6–20)
CALCIUM SERPL-MCNC: 9.3 MG/DL (ref 8.7–10.5)
CHLORIDE SERPL-SCNC: 105 MMOL/L (ref 95–110)
CO2 SERPL-SCNC: 21 MMOL/L (ref 23–29)
CREAT SERPL-MCNC: 0.7 MG/DL (ref 0.5–1.4)
CREAT UR-MCNC: 41 MG/DL (ref 15–325)
CTP QC/QA: YES
DIFFERENTIAL METHOD: ABNORMAL
EOSINOPHIL # BLD AUTO: 0.3 K/UL (ref 0–0.5)
EOSINOPHIL NFR BLD: 4 % (ref 0–8)
ERYTHROCYTE [DISTWIDTH] IN BLOOD BY AUTOMATED COUNT: 14.6 % (ref 11.5–14.5)
EST. GFR  (AFRICAN AMERICAN): >60 ML/MIN/1.73 M^2
EST. GFR  (NON AFRICAN AMERICAN): >60 ML/MIN/1.73 M^2
GLUCOSE SERPL-MCNC: 102 MG/DL (ref 70–110)
HCT VFR BLD AUTO: 39.6 % (ref 37–48.5)
HGB BLD-MCNC: 13.1 G/DL (ref 12–16)
IMM GRANULOCYTES # BLD AUTO: 0.03 K/UL (ref 0–0.04)
IMM GRANULOCYTES NFR BLD AUTO: 0.4 % (ref 0–0.5)
LYMPHOCYTES # BLD AUTO: 1.3 K/UL (ref 1–4.8)
LYMPHOCYTES NFR BLD: 18.5 % (ref 18–48)
MCH RBC QN AUTO: 29.4 PG (ref 27–31)
MCHC RBC AUTO-ENTMCNC: 33.1 G/DL (ref 32–36)
MCV RBC AUTO: 89 FL (ref 82–98)
MONOCYTES # BLD AUTO: 0.6 K/UL (ref 0.3–1)
MONOCYTES NFR BLD: 7.9 % (ref 4–15)
NEUTROPHILS # BLD AUTO: 4.7 K/UL (ref 1.8–7.7)
NEUTROPHILS NFR BLD: 68.3 % (ref 38–73)
NRBC BLD-RTO: 0 /100 WBC
PLATELET # BLD AUTO: 324 K/UL (ref 150–450)
PMV BLD AUTO: 11 FL (ref 9.2–12.9)
POTASSIUM SERPL-SCNC: 3.7 MMOL/L (ref 3.5–5.1)
PROT SERPL-MCNC: 7.6 G/DL (ref 6–8.4)
PROT UR-MCNC: <7 MG/DL (ref 0–15)
PROT/CREAT UR: NORMAL MG/G{CREAT} (ref 0–0.2)
RBC # BLD AUTO: 4.45 M/UL (ref 4–5.4)
SODIUM SERPL-SCNC: 136 MMOL/L (ref 136–145)
WBC # BLD AUTO: 6.93 K/UL (ref 3.9–12.7)

## 2022-02-15 PROCEDURE — 80053 COMPREHEN METABOLIC PANEL: CPT | Performed by: OBSTETRICS & GYNECOLOGY

## 2022-02-15 PROCEDURE — 87491 CHLMYD TRACH DNA AMP PROBE: CPT | Performed by: OBSTETRICS & GYNECOLOGY

## 2022-02-15 PROCEDURE — 87088 URINE BACTERIA CULTURE: CPT | Performed by: OBSTETRICS & GYNECOLOGY

## 2022-02-15 PROCEDURE — 3008F BODY MASS INDEX DOCD: CPT | Mod: CPTII,S$GLB,, | Performed by: OBSTETRICS & GYNECOLOGY

## 2022-02-15 PROCEDURE — 99999 PR PBB SHADOW E&M-EST. PATIENT-LVL III: CPT | Mod: PBBFAC,,, | Performed by: OBSTETRICS & GYNECOLOGY

## 2022-02-15 PROCEDURE — 86850 RBC ANTIBODY SCREEN: CPT | Performed by: OBSTETRICS & GYNECOLOGY

## 2022-02-15 PROCEDURE — 81025 URINE PREGNANCY TEST: CPT | Mod: S$GLB,,, | Performed by: OBSTETRICS & GYNECOLOGY

## 2022-02-15 PROCEDURE — 87086 URINE CULTURE/COLONY COUNT: CPT | Performed by: OBSTETRICS & GYNECOLOGY

## 2022-02-15 PROCEDURE — 1160F PR REVIEW ALL MEDS BY PRESCRIBER/CLIN PHARMACIST DOCUMENTED: ICD-10-PCS | Mod: CPTII,S$GLB,, | Performed by: OBSTETRICS & GYNECOLOGY

## 2022-02-15 PROCEDURE — 3074F SYST BP LT 130 MM HG: CPT | Mod: CPTII,S$GLB,, | Performed by: OBSTETRICS & GYNECOLOGY

## 2022-02-15 PROCEDURE — 99395 PR PREVENTIVE VISIT,EST,18-39: ICD-10-PCS | Mod: S$GLB,,, | Performed by: OBSTETRICS & GYNECOLOGY

## 2022-02-15 PROCEDURE — 36415 COLL VENOUS BLD VENIPUNCTURE: CPT | Performed by: OBSTETRICS & GYNECOLOGY

## 2022-02-15 PROCEDURE — 1160F RVW MEDS BY RX/DR IN RCRD: CPT | Mod: CPTII,S$GLB,, | Performed by: OBSTETRICS & GYNECOLOGY

## 2022-02-15 PROCEDURE — 3008F PR BODY MASS INDEX (BMI) DOCUMENTED: ICD-10-PCS | Mod: CPTII,S$GLB,, | Performed by: OBSTETRICS & GYNECOLOGY

## 2022-02-15 PROCEDURE — 88175 CYTOPATH C/V AUTO FLUID REDO: CPT | Performed by: OBSTETRICS & GYNECOLOGY

## 2022-02-15 PROCEDURE — 86787 VARICELLA-ZOSTER ANTIBODY: CPT | Performed by: OBSTETRICS & GYNECOLOGY

## 2022-02-15 PROCEDURE — 86762 RUBELLA ANTIBODY: CPT | Performed by: OBSTETRICS & GYNECOLOGY

## 2022-02-15 PROCEDURE — 99999 PR PBB SHADOW E&M-EST. PATIENT-LVL III: ICD-10-PCS | Mod: PBBFAC,,, | Performed by: OBSTETRICS & GYNECOLOGY

## 2022-02-15 PROCEDURE — 87624 HPV HI-RISK TYP POOLED RSLT: CPT | Performed by: OBSTETRICS & GYNECOLOGY

## 2022-02-15 PROCEDURE — 86592 SYPHILIS TEST NON-TREP QUAL: CPT | Performed by: OBSTETRICS & GYNECOLOGY

## 2022-02-15 PROCEDURE — 1159F PR MEDICATION LIST DOCUMENTED IN MEDICAL RECORD: ICD-10-PCS | Mod: CPTII,S$GLB,, | Performed by: OBSTETRICS & GYNECOLOGY

## 2022-02-15 PROCEDURE — 87340 HEPATITIS B SURFACE AG IA: CPT | Performed by: OBSTETRICS & GYNECOLOGY

## 2022-02-15 PROCEDURE — 3078F PR MOST RECENT DIASTOLIC BLOOD PRESSURE < 80 MM HG: ICD-10-PCS | Mod: CPTII,S$GLB,, | Performed by: OBSTETRICS & GYNECOLOGY

## 2022-02-15 PROCEDURE — 87186 SC STD MICRODIL/AGAR DIL: CPT | Performed by: OBSTETRICS & GYNECOLOGY

## 2022-02-15 PROCEDURE — 82570 ASSAY OF URINE CREATININE: CPT | Performed by: OBSTETRICS & GYNECOLOGY

## 2022-02-15 PROCEDURE — 87591 N.GONORRHOEAE DNA AMP PROB: CPT | Performed by: OBSTETRICS & GYNECOLOGY

## 2022-02-15 PROCEDURE — 1159F MED LIST DOCD IN RCRD: CPT | Mod: CPTII,S$GLB,, | Performed by: OBSTETRICS & GYNECOLOGY

## 2022-02-15 PROCEDURE — 3074F PR MOST RECENT SYSTOLIC BLOOD PRESSURE < 130 MM HG: ICD-10-PCS | Mod: CPTII,S$GLB,, | Performed by: OBSTETRICS & GYNECOLOGY

## 2022-02-15 PROCEDURE — 87389 HIV-1 AG W/HIV-1&-2 AB AG IA: CPT | Performed by: OBSTETRICS & GYNECOLOGY

## 2022-02-15 PROCEDURE — 85025 COMPLETE CBC W/AUTO DIFF WBC: CPT | Performed by: OBSTETRICS & GYNECOLOGY

## 2022-02-15 PROCEDURE — 99395 PREV VISIT EST AGE 18-39: CPT | Mod: S$GLB,,, | Performed by: OBSTETRICS & GYNECOLOGY

## 2022-02-15 PROCEDURE — 3078F DIAST BP <80 MM HG: CPT | Mod: CPTII,S$GLB,, | Performed by: OBSTETRICS & GYNECOLOGY

## 2022-02-15 PROCEDURE — 87077 CULTURE AEROBIC IDENTIFY: CPT | Performed by: OBSTETRICS & GYNECOLOGY

## 2022-02-15 PROCEDURE — 86803 HEPATITIS C AB TEST: CPT | Performed by: OBSTETRICS & GYNECOLOGY

## 2022-02-15 PROCEDURE — 81025 POCT URINE PREGNANCY: ICD-10-PCS | Mod: S$GLB,,, | Performed by: OBSTETRICS & GYNECOLOGY

## 2022-02-15 NOTE — PROGRESS NOTES
SUBJECTIVE:   36 y.o. female   for annual routine Pap and checkup. No LMP recorded (within months)..  She complains of amenorrhea.  +UPT.  She is taking a pnv.  She has had her flu shot.  She would like genetic testing.  She has a history of gestational hypertension and will need asa.        Past Medical History:   Diagnosis Date    PCOS (polycystic ovarian syndrome)      Past Surgical History:   Procedure Laterality Date    TONSILLECTOMY       Social History     Socioeconomic History    Marital status:    Tobacco Use    Smoking status: Never Smoker    Smokeless tobacco: Never Used   Substance and Sexual Activity    Alcohol use: Not Currently     Comment: socially    Drug use: No    Sexual activity: Yes     Partners: Male     Family History   Problem Relation Age of Onset    Diabetes Maternal Grandmother     Diabetes Maternal Grandfather     Ovarian cancer Neg Hx     Eclampsia Neg Hx      OB History    Para Term  AB Living   3 2 2 0 1 2   SAB IAB Ectopic Multiple Live Births   0 0 0 0 2      # Outcome Date GA Lbr Jose Alejandro/2nd Weight Sex Delivery Anes PTL Lv   3 Term 20 39w0d  2.98 kg (6 lb 9.1 oz) M Vag-Spont EPI N CHRISTOPHER   2 Term 18 39w0d  2.23 kg (4 lb 14.7 oz) F Vag-Spont  N CHRISTOPHER   1 AB                  Current Outpatient Medications   Medication Sig Dispense Refill    prenatal 25/iron fum/folic/dha (PRENATAL-1 ORAL) Take by mouth.      sertraline (ZOLOFT) 50 MG tablet Take 1 tablet (50 mg total) by mouth once daily. 30 tablet 12     No current facility-administered medications for this visit.     Allergies: Adhesive     ROS:  Constitutional: no weight loss, weight gain, fever, fatigue  Eyes:  No vision changes, glasses/contacts  ENT/Mouth: No ulcers, sinus problems, ears ringing, headache  Cardiovascular: No inability to lie flat, chest pain, exercise intolerance, swelling, heart palpitations  Respiratory: No wheezing, coughing blood, shortness of breath, or  "cough  Gastrointestinal: No diarrhea, bloody stool, nausea/vomiting, constipation, gas, hemorrhoids  Genitourinary: No blood in urine, painful urination, urgency of urination, frequency of urination, incomplete emptying, incontinence, abnormal bleeding, painful periods, heavy periods, vaginal discharge, vaginal odor, painful intercourse, sexual problems, bleeding after intercourse.  Musculoskeletal: No muscle weakness  Skin/Breast: No painful breasts, nipple discharge, masses, rash, ulcers  Neurological: No passing out, seizures, numbness, headache  Endocrine: No diabetes, hypothyroid, hyperthyroid, hot flashes, hair loss, abnormal hair growth, ance  Psychiatric: No depression, crying  Hematologic: No bruises, bleeding, swollen lymph nodes, anemia.      OBJECTIVE:   The patient appears well, alert, oriented x 3, in no distress.  /78   Ht 5' 9" (1.753 m)   Wt 85 kg (187 lb 6.3 oz)   LMP  (Within Months)   BMI 27.67 kg/m²   NECK: no thyromegaly, trachea midline  SKIN: no acne, striae, hirsutism  BREAST EXAM: breasts appear normal, no suspicious masses, no skin or nipple changes or axillary nodes  ABDOMEN: no hernias, masses, or hepatosplenomegaly  GENITALIA: normal external genitalia, no erythema, no discharge  URETHRA: normal urethra, normal urethral meatus  VAGINA: Normal  CERVIX: no lesions or cervical motion tenderness  UTERUS: enlarged, 6 weeks size  ADNEXA: normal adnexa and no mass, fullness, tenderness    \  ASSESSMENT:   Baldo was seen today for possible pregnancy.    Diagnoses and all orders for this visit:    Well woman exam with routine gynecological exam  -     Liquid-Based Pap Smear, Screening  -     HPV High Risk Genotypes, PCR    Possible pregnancy  -     POCT urine pregnancy    Positive pregnancy test  -     HIV 1/2 Ag/Ab (4th Gen); Future  -     RPR; Future  -     Hepatitis B Surface Antigen; Future  -     Type & Screen - Ob Profile; Future  -     Rubella Antibody, IgG; Future  -     Urine " culture  -     C. trachomatis/N. gonorrhoeae by AMP DNA  -     CBC Auto Differential; Future  -     US OB/GYN Procedure (Viewpoint); Future  -     HEPATITIS C ANTIBODY; Future  -     Varicella zoster antibody, IgG; Future  -     Comprehensive Metabolic Panel; Future  -     Protein/Creatinine Ratio, Urine      Counseled to avoid cat litter, not garden without gloves, avoid raw meat, heat up deli meat, to eat large fish like tuna no more than once a week, and to avoid soft unpasteurized cheeses.  I recommend a PNV daily.  She should avoid ibuprofen.

## 2022-02-16 LAB
C TRACH DNA SPEC QL NAA+PROBE: NOT DETECTED
N GONORRHOEA DNA SPEC QL NAA+PROBE: NOT DETECTED
RPR SER QL: NORMAL
RUBV IGG SER-ACNC: 12.1 IU/ML
RUBV IGG SER-IMP: REACTIVE
VARICELLA INTERPRETATION: POSITIVE
VARICELLA ZOSTER IGG: 1.97 ISR (ref 0–0.9)

## 2022-02-17 LAB
BACTERIA UR CULT: ABNORMAL
HBV SURFACE AG SERPL QL IA: NEGATIVE
HCV AB SERPL QL IA: NEGATIVE

## 2022-02-18 ENCOUNTER — PATIENT MESSAGE (OUTPATIENT)
Dept: OBSTETRICS AND GYNECOLOGY | Facility: CLINIC | Age: 37
End: 2022-02-18
Payer: COMMERCIAL

## 2022-02-18 LAB — HIV 1+2 AB+HIV1 P24 AG SERPL QL IA: NEGATIVE

## 2022-02-18 RX ORDER — NITROFURANTOIN 25; 75 MG/1; MG/1
100 CAPSULE ORAL 2 TIMES DAILY
Qty: 14 CAPSULE | Refills: 0 | Status: SHIPPED | OUTPATIENT
Start: 2022-02-18 | End: 2022-02-25

## 2022-02-21 ENCOUNTER — PATIENT MESSAGE (OUTPATIENT)
Dept: OBSTETRICS AND GYNECOLOGY | Facility: CLINIC | Age: 37
End: 2022-02-21
Payer: COMMERCIAL

## 2022-02-21 LAB
CLINICAL INFO: NORMAL
CYTO CVX: NORMAL
CYTOLOGIST CVX/VAG CYTO: NORMAL
CYTOLOGIST CVX/VAG CYTO: NORMAL
CYTOLOGY CMNT CVX/VAG CYTO-IMP: NORMAL
CYTOLOGY PAP THIN PREP EXPLANATION: NORMAL
DATE OF PREVIOUS PAP: NO
DATE PREVIOUS BX: NO
GEN CATEG CVX/VAG CYTO-IMP: NORMAL
HPV I/H RISK 4 DNA CVX QL NAA+PROBE: NOT DETECTED
LMP START DATE: NORMAL
MICROORGANISM CVX/VAG CYTO: NORMAL
PATHOLOGIST CVX/VAG CYTO: NORMAL
SERVICE CMNT-IMP: NORMAL
SPECIMEN SOURCE CVX/VAG CYTO: NORMAL
STAT OF ADQ CVX/VAG CYTO-IMP: NORMAL

## 2022-02-25 ENCOUNTER — PROCEDURE VISIT (OUTPATIENT)
Dept: OBSTETRICS AND GYNECOLOGY | Facility: CLINIC | Age: 37
End: 2022-02-25
Attending: OBSTETRICS & GYNECOLOGY
Payer: COMMERCIAL

## 2022-02-25 DIAGNOSIS — Z32.01 POSITIVE PREGNANCY TEST: ICD-10-CM

## 2022-02-25 PROCEDURE — 76801 US OB/GYN PROCEDURE (VIEWPOINT): ICD-10-PCS | Mod: S$GLB,,, | Performed by: OBSTETRICS & GYNECOLOGY

## 2022-02-25 PROCEDURE — 76801 OB US < 14 WKS SINGLE FETUS: CPT | Mod: S$GLB,,, | Performed by: OBSTETRICS & GYNECOLOGY

## 2022-03-01 ENCOUNTER — PATIENT MESSAGE (OUTPATIENT)
Dept: OBSTETRICS AND GYNECOLOGY | Facility: CLINIC | Age: 37
End: 2022-03-01
Payer: COMMERCIAL

## 2022-03-16 ENCOUNTER — INITIAL PRENATAL (OUTPATIENT)
Dept: OBSTETRICS AND GYNECOLOGY | Facility: CLINIC | Age: 37
End: 2022-03-16
Attending: OBSTETRICS & GYNECOLOGY
Payer: COMMERCIAL

## 2022-03-16 VITALS — WEIGHT: 180.75 LBS | BODY MASS INDEX: 26.7 KG/M2 | DIASTOLIC BLOOD PRESSURE: 68 MMHG | SYSTOLIC BLOOD PRESSURE: 120 MMHG

## 2022-03-16 DIAGNOSIS — Z87.59 HISTORY OF PRIOR PREGNANCY WITH IUGR NEWBORN: ICD-10-CM

## 2022-03-16 DIAGNOSIS — Z86.59 HISTORY OF POSTPARTUM DEPRESSION, CURRENTLY PREGNANT: ICD-10-CM

## 2022-03-16 DIAGNOSIS — Z87.59 HISTORY OF GESTATIONAL HYPERTENSION: ICD-10-CM

## 2022-03-16 DIAGNOSIS — O09.91 SUPERVISION OF HIGH RISK PREGNANCY IN FIRST TRIMESTER: ICD-10-CM

## 2022-03-16 DIAGNOSIS — O99.891 HISTORY OF POSTPARTUM DEPRESSION, CURRENTLY PREGNANT: ICD-10-CM

## 2022-03-16 DIAGNOSIS — O09.521 MULTIGRAVIDA OF ADVANCED MATERNAL AGE IN FIRST TRIMESTER: ICD-10-CM

## 2022-03-16 PROBLEM — O09.90 PREGNANCY, SUPERVISION, HIGH-RISK: Status: ACTIVE | Noted: 2022-03-16

## 2022-03-16 PROBLEM — O09.529 ADVANCED MATERNAL AGE IN MULTIGRAVIDA: Status: ACTIVE | Noted: 2022-03-16

## 2022-03-16 PROCEDURE — 87086 URINE CULTURE/COLONY COUNT: CPT | Performed by: OBSTETRICS & GYNECOLOGY

## 2022-03-16 PROCEDURE — 99999 PR PBB SHADOW E&M-EST. PATIENT-LVL III: ICD-10-PCS | Mod: PBBFAC,,, | Performed by: OBSTETRICS & GYNECOLOGY

## 2022-03-16 PROCEDURE — 99999 PR PBB SHADOW E&M-EST. PATIENT-LVL III: CPT | Mod: PBBFAC,,, | Performed by: OBSTETRICS & GYNECOLOGY

## 2022-03-16 PROCEDURE — 0502F SUBSEQUENT PRENATAL CARE: CPT | Mod: CPTII,S$GLB,, | Performed by: OBSTETRICS & GYNECOLOGY

## 2022-03-16 PROCEDURE — 0502F PR SUBSEQUENT PRENATAL CARE: ICD-10-PCS | Mod: CPTII,S$GLB,, | Performed by: OBSTETRICS & GYNECOLOGY

## 2022-03-16 RX ORDER — ONDANSETRON 4 MG/1
4 TABLET, ORALLY DISINTEGRATING ORAL EVERY 8 HOURS
Qty: 60 TABLET | Refills: 3 | Status: ON HOLD | OUTPATIENT
Start: 2022-03-16 | End: 2022-10-11 | Stop reason: HOSPADM

## 2022-03-16 NOTE — PROGRESS NOTES
Good fm.  Denies ctx, vb, lof.  She complains of n/v.  unisom helps some.  Will add zofran.  Counseled, desires matT21.  She will need asa at 12 weeks.  Paula was seen today for initial prenatal visit.    Diagnoses and all orders for this visit:    Supervision of high risk pregnancy in first trimester  -     Urine culture    Multigravida of advanced maternal age in first trimester    History of prior pregnancy with IUGR     History of gestational hypertension    History of postpartum depression, currently pregnant    Other orders  -     ondansetron (ZOFRAN-ODT) 4 MG TbDL; Take 1 tablet (4 mg total) by mouth every 8 (eight) hours.

## 2022-03-17 LAB
BACTERIA UR CULT: NORMAL
BACTERIA UR CULT: NORMAL

## 2022-03-18 ENCOUNTER — PATIENT MESSAGE (OUTPATIENT)
Dept: OBSTETRICS AND GYNECOLOGY | Facility: CLINIC | Age: 37
End: 2022-03-18
Payer: COMMERCIAL

## 2022-04-06 ENCOUNTER — PATIENT MESSAGE (OUTPATIENT)
Dept: ADMINISTRATIVE | Facility: OTHER | Age: 37
End: 2022-04-06
Payer: COMMERCIAL

## 2022-04-11 NOTE — PROGRESS NOTES
Subjective:       Patient ID: Paula Armendariz is a 32 y.o. female.    Chief Complaint: Depression (PP) and Anxiety    She complains of increased anxiety and crying since she delivered.  This is beginning to affect her work and home life.  She is having trouble sleeping and expresses anhedonia.  She denies si/si or hi/hi.        Depression   Visit Type: initial    Anxiety         Gynecologic Exam   The patient's pertinent negatives include no genital itching, genital lesions, genital odor, genital rash, missed menses, pelvic pain, vaginal bleeding or vaginal discharge. She is not pregnant.     Review of Systems   Genitourinary: Negative for missed menses, pelvic pain and vaginal discharge.   Psychiatric/Behavioral: Positive for depression.       Objective:      Physical Exam   Constitutional: She is oriented to person, place, and time. She appears well-developed and well-nourished.   Neurological: She is alert and oriented to person, place, and time.   Psychiatric: She has a normal mood and affect. Her behavior is normal. Judgment and thought content normal.       Assessment:       1. Postpartum depression        Plan:       Will start zoloft 50mg.  She will return in a month.  Discussed counseling to help as well.  She will let me know if her symptoms worsen.      No Change No Change No Change Improving No Change Improving

## 2022-04-13 ENCOUNTER — PATIENT MESSAGE (OUTPATIENT)
Dept: ADMINISTRATIVE | Facility: OTHER | Age: 37
End: 2022-04-13
Payer: COMMERCIAL

## 2022-04-14 ENCOUNTER — PATIENT MESSAGE (OUTPATIENT)
Dept: OBSTETRICS AND GYNECOLOGY | Facility: CLINIC | Age: 37
End: 2022-04-14
Payer: COMMERCIAL

## 2022-04-19 ENCOUNTER — ROUTINE PRENATAL (OUTPATIENT)
Dept: OBSTETRICS AND GYNECOLOGY | Facility: CLINIC | Age: 37
End: 2022-04-19
Attending: OBSTETRICS & GYNECOLOGY
Payer: COMMERCIAL

## 2022-04-19 VITALS
WEIGHT: 178.56 LBS | DIASTOLIC BLOOD PRESSURE: 70 MMHG | SYSTOLIC BLOOD PRESSURE: 118 MMHG | BODY MASS INDEX: 26.37 KG/M2

## 2022-04-19 DIAGNOSIS — O09.522 MULTIGRAVIDA OF ADVANCED MATERNAL AGE IN SECOND TRIMESTER: ICD-10-CM

## 2022-04-19 DIAGNOSIS — O20.0 THREATENED ABORTION: ICD-10-CM

## 2022-04-19 DIAGNOSIS — Z87.59 HISTORY OF PRIOR PREGNANCY WITH IUGR NEWBORN: ICD-10-CM

## 2022-04-19 DIAGNOSIS — Z86.59 HISTORY OF POSTPARTUM DEPRESSION, CURRENTLY PREGNANT: ICD-10-CM

## 2022-04-19 DIAGNOSIS — Z87.59 HISTORY OF GESTATIONAL HYPERTENSION: ICD-10-CM

## 2022-04-19 DIAGNOSIS — O09.92 SUPERVISION OF HIGH RISK PREGNANCY IN SECOND TRIMESTER: Primary | ICD-10-CM

## 2022-04-19 DIAGNOSIS — O99.891 HISTORY OF POSTPARTUM DEPRESSION, CURRENTLY PREGNANT: ICD-10-CM

## 2022-04-19 PROCEDURE — 0502F PR SUBSEQUENT PRENATAL CARE: ICD-10-PCS | Mod: CPTII,S$GLB,, | Performed by: OBSTETRICS & GYNECOLOGY

## 2022-04-19 PROCEDURE — 99999 PR PBB SHADOW E&M-EST. PATIENT-LVL III: CPT | Mod: PBBFAC,,, | Performed by: OBSTETRICS & GYNECOLOGY

## 2022-04-19 PROCEDURE — 0502F SUBSEQUENT PRENATAL CARE: CPT | Mod: CPTII,S$GLB,, | Performed by: OBSTETRICS & GYNECOLOGY

## 2022-04-19 PROCEDURE — 99999 PR PBB SHADOW E&M-EST. PATIENT-LVL III: ICD-10-PCS | Mod: PBBFAC,,, | Performed by: OBSTETRICS & GYNECOLOGY

## 2022-04-19 RX ORDER — NAPROXEN SODIUM 220 MG/1
81 TABLET, FILM COATED ORAL DAILY
Qty: 30 TABLET | Refills: 12 | Status: ON HOLD | OUTPATIENT
Start: 2022-04-19 | End: 2022-10-11 | Stop reason: HOSPADM

## 2022-04-19 NOTE — PROGRESS NOTES
She had an episode of bleeding and cramping last week.  +FHT today.  She desires matT21.  Paula was seen today for routine prenatal visit.    Diagnoses and all orders for this visit:    Supervision of high risk pregnancy in second trimester  -     Connected MOM Enrollment  -     Assign Connected MOM Program Consent Questionnaire    Multigravida of advanced maternal age in second trimester    History of prior pregnancy with IUGR   -     aspirin 81 MG Chew; Take 1 tablet (81 mg total) by mouth once daily.    History of gestational hypertension  -     aspirin 81 MG Chew; Take 1 tablet (81 mg total) by mouth once daily.    History of postpartum depression, currently pregnant    Threatened   -     US OB/GYN Procedure (Viewpoint); Future

## 2022-04-22 ENCOUNTER — LAB VISIT (OUTPATIENT)
Dept: LAB | Facility: OTHER | Age: 37
End: 2022-04-22
Attending: OBSTETRICS & GYNECOLOGY
Payer: COMMERCIAL

## 2022-04-22 ENCOUNTER — TELEPHONE (OUTPATIENT)
Dept: OBSTETRICS AND GYNECOLOGY | Facility: CLINIC | Age: 37
End: 2022-04-22
Payer: COMMERCIAL

## 2022-04-22 ENCOUNTER — PATIENT MESSAGE (OUTPATIENT)
Dept: OBSTETRICS AND GYNECOLOGY | Facility: CLINIC | Age: 37
End: 2022-04-22
Payer: COMMERCIAL

## 2022-04-22 ENCOUNTER — PROCEDURE VISIT (OUTPATIENT)
Dept: OBSTETRICS AND GYNECOLOGY | Facility: CLINIC | Age: 37
End: 2022-04-22
Attending: OBSTETRICS & GYNECOLOGY
Payer: COMMERCIAL

## 2022-04-22 DIAGNOSIS — O20.0 THREATENED ABORTION: Primary | ICD-10-CM

## 2022-04-22 DIAGNOSIS — O20.0 THREATENED ABORTION: ICD-10-CM

## 2022-04-22 LAB
BASOPHILS # BLD AUTO: 0.04 K/UL (ref 0–0.2)
BASOPHILS NFR BLD: 0.6 % (ref 0–1.9)
DIFFERENTIAL METHOD: NORMAL
EOSINOPHIL # BLD AUTO: 0.2 K/UL (ref 0–0.5)
EOSINOPHIL NFR BLD: 2.3 % (ref 0–8)
ERYTHROCYTE [DISTWIDTH] IN BLOOD BY AUTOMATED COUNT: 13.3 % (ref 11.5–14.5)
HCT VFR BLD AUTO: 37.4 % (ref 37–48.5)
HGB BLD-MCNC: 12.5 G/DL (ref 12–16)
IMM GRANULOCYTES # BLD AUTO: 0.03 K/UL (ref 0–0.04)
IMM GRANULOCYTES NFR BLD AUTO: 0.5 % (ref 0–0.5)
LYMPHOCYTES # BLD AUTO: 1.4 K/UL (ref 1–4.8)
LYMPHOCYTES NFR BLD: 20.5 % (ref 18–48)
MCH RBC QN AUTO: 29.8 PG (ref 27–31)
MCHC RBC AUTO-ENTMCNC: 33.4 G/DL (ref 32–36)
MCV RBC AUTO: 89 FL (ref 82–98)
MONOCYTES # BLD AUTO: 0.5 K/UL (ref 0.3–1)
MONOCYTES NFR BLD: 7.7 % (ref 4–15)
NEUTROPHILS # BLD AUTO: 4.6 K/UL (ref 1.8–7.7)
NEUTROPHILS NFR BLD: 68.4 % (ref 38–73)
NRBC BLD-RTO: 0 /100 WBC
PLATELET # BLD AUTO: 260 K/UL (ref 150–450)
PMV BLD AUTO: 11.4 FL (ref 9.2–12.9)
RBC # BLD AUTO: 4.2 M/UL (ref 4–5.4)
WBC # BLD AUTO: 6.65 K/UL (ref 3.9–12.7)

## 2022-04-22 PROCEDURE — 76815 OB US LIMITED FETUS(S): CPT | Mod: S$GLB,,, | Performed by: OBSTETRICS & GYNECOLOGY

## 2022-04-22 PROCEDURE — 85025 COMPLETE CBC W/AUTO DIFF WBC: CPT | Performed by: OBSTETRICS & GYNECOLOGY

## 2022-04-22 PROCEDURE — 76815 US OB/GYN PROCEDURE (VIEWPOINT): ICD-10-PCS | Mod: S$GLB,,, | Performed by: OBSTETRICS & GYNECOLOGY

## 2022-04-22 PROCEDURE — 36415 COLL VENOUS BLD VENIPUNCTURE: CPT | Performed by: OBSTETRICS & GYNECOLOGY

## 2022-05-04 ENCOUNTER — PATIENT MESSAGE (OUTPATIENT)
Dept: OBSTETRICS AND GYNECOLOGY | Facility: CLINIC | Age: 37
End: 2022-05-04
Payer: COMMERCIAL

## 2022-05-13 ENCOUNTER — PATIENT MESSAGE (OUTPATIENT)
Dept: OBSTETRICS AND GYNECOLOGY | Facility: CLINIC | Age: 37
End: 2022-05-13
Payer: COMMERCIAL

## 2022-05-25 ENCOUNTER — PROCEDURE VISIT (OUTPATIENT)
Dept: OBSTETRICS AND GYNECOLOGY | Facility: CLINIC | Age: 37
End: 2022-05-25
Payer: COMMERCIAL

## 2022-05-25 ENCOUNTER — ROUTINE PRENATAL (OUTPATIENT)
Dept: OBSTETRICS AND GYNECOLOGY | Facility: CLINIC | Age: 37
End: 2022-05-25
Attending: OBSTETRICS & GYNECOLOGY
Payer: COMMERCIAL

## 2022-05-25 VITALS — WEIGHT: 181 LBS | SYSTOLIC BLOOD PRESSURE: 108 MMHG | BODY MASS INDEX: 26.73 KG/M2 | DIASTOLIC BLOOD PRESSURE: 60 MMHG

## 2022-05-25 DIAGNOSIS — Z87.59 HISTORY OF GESTATIONAL HYPERTENSION: ICD-10-CM

## 2022-05-25 DIAGNOSIS — R51.9 NONINTRACTABLE HEADACHE, UNSPECIFIED CHRONICITY PATTERN, UNSPECIFIED HEADACHE TYPE: ICD-10-CM

## 2022-05-25 DIAGNOSIS — Z87.59 HISTORY OF PRIOR PREGNANCY WITH IUGR NEWBORN: ICD-10-CM

## 2022-05-25 DIAGNOSIS — O09.92 SUPERVISION OF HIGH RISK PREGNANCY IN SECOND TRIMESTER: ICD-10-CM

## 2022-05-25 DIAGNOSIS — O09.92 SUPERVISION OF HIGH RISK PREGNANCY IN SECOND TRIMESTER: Primary | ICD-10-CM

## 2022-05-25 DIAGNOSIS — O09.522 MULTIGRAVIDA OF ADVANCED MATERNAL AGE IN SECOND TRIMESTER: ICD-10-CM

## 2022-05-25 PROCEDURE — 99999 PR PBB SHADOW E&M-EST. PATIENT-LVL III: ICD-10-PCS | Mod: PBBFAC,,, | Performed by: OBSTETRICS & GYNECOLOGY

## 2022-05-25 PROCEDURE — 0502F SUBSEQUENT PRENATAL CARE: CPT | Mod: CPTII,S$GLB,, | Performed by: OBSTETRICS & GYNECOLOGY

## 2022-05-25 PROCEDURE — 99999 PR PBB SHADOW E&M-EST. PATIENT-LVL III: CPT | Mod: PBBFAC,,, | Performed by: OBSTETRICS & GYNECOLOGY

## 2022-05-25 PROCEDURE — 0502F PR SUBSEQUENT PRENATAL CARE: ICD-10-PCS | Mod: CPTII,S$GLB,, | Performed by: OBSTETRICS & GYNECOLOGY

## 2022-05-25 PROCEDURE — 82105 ALPHA-FETOPROTEIN SERUM: CPT

## 2022-05-25 RX ORDER — DIPHENHYDRAMINE HCL 25 MG
25 CAPSULE ORAL EVERY 4 HOURS PRN
Qty: 30 CAPSULE | Refills: 1 | Status: ON HOLD | OUTPATIENT
Start: 2022-05-25 | End: 2022-10-11 | Stop reason: HOSPADM

## 2022-05-25 RX ORDER — METOCLOPRAMIDE 10 MG/1
10 TABLET ORAL EVERY 6 HOURS PRN
Qty: 30 TABLET | Refills: 1 | Status: ON HOLD | OUTPATIENT
Start: 2022-05-25 | End: 2022-10-11 | Stop reason: HOSPADM

## 2022-05-25 NOTE — PROGRESS NOTES
Pregnancy dating, labs, ultrasound reports, prenatal testing, and problem list; prior records and results; and available outside records were reviewed and updated in EMR.  Pertinent findings were noted below.    Reason for Visit   Routine Prenatal Visit    HPI   36 y.o., at 19w0d by Estimated Date of Delivery: 10/19/22    Reports increasing headaches. Has h/o migraines in high school, states these headaches feel similar. Will have a dull headache for 2-3 days at a time; tylenol, caffeine, and rest provide some relief. No vision changes, CP, SOB, RUQ pain.     No further episodes of vaginal bleeding.     Contractions: No   Bleeding: No   Loss of fluid: No   Fetal movement: Yes   Nausea: No   Vomiting: No   Headache: Yes (see above)     Exam   /60   Wt 82.1 kg (181 lb)   BMI 26.73 kg/m²     GENERAL: No acute distress  ABD: Gravid < umbilicus    Assessment and Plan   Supervision of high risk pregnancy in second trimester  -     US MFM Procedure (Viewpoint); Future  -     Maternal Screen AFP (Single Marker); Future; Expected date: 2022    History of prior pregnancy with IUGR     History of gestational hypertension    Multigravida of advanced maternal age in second trimester    Nonintractable headache, unspecified chronicity pattern, unspecified headache type    Other orders  -     metoclopramide HCl (REGLAN) 10 MG tablet; Take 1 tablet (10 mg total) by mouth every 6 (six) hours as needed (headaches).  Dispense: 30 tablet; Refill: 1  -     diphenhydrAMINE (BENADRYL) 25 mg capsule; Take 1 capsule (25 mg total) by mouth every 4 (four) hours as needed (headaches).  Dispense: 30 capsule; Refill: 1         Reglan/benadryl for headaches   Anatomy US ordered    Mat21 negative. AFP to be drawn today     SAB precautions given  Follow-up: 4 weeks    Oumou Gonzalez MD  OBGYN, PGY-1

## 2022-05-25 NOTE — PROGRESS NOTES
Lab Documentation:    Order Type: Written Order placed in [facility]    Patient in for lab visit only per provider treatment plan.

## 2022-05-25 NOTE — PROGRESS NOTES
Seen and examined.  Agree with note.  All questions answered  If headaches unrelieved would consider neurology consult.

## 2022-05-26 ENCOUNTER — PATIENT MESSAGE (OUTPATIENT)
Dept: MATERNAL FETAL MEDICINE | Facility: CLINIC | Age: 37
End: 2022-05-26
Payer: COMMERCIAL

## 2022-05-31 LAB
# FETUSES US: NORMAL
AFP INTERPRETATION: NORMAL
AFP MOM SERPL: 0.92
AFP SERPL-MCNC: 43.2 NG/ML
AFP SERPL-MCNC: NEGATIVE NG/ML
AGE AT DELIVERY: 35
GA (DAYS): 2 D
GA (WEEKS): 19 WK
GESTATIONAL AGE METHOD: NORMAL
IDDM PATIENT QL: NORMAL
SMOKING STATUS FTND: NO

## 2022-06-06 ENCOUNTER — PATIENT MESSAGE (OUTPATIENT)
Dept: MATERNAL FETAL MEDICINE | Facility: CLINIC | Age: 37
End: 2022-06-06
Payer: COMMERCIAL

## 2022-06-07 ENCOUNTER — PROCEDURE VISIT (OUTPATIENT)
Dept: MATERNAL FETAL MEDICINE | Facility: CLINIC | Age: 37
End: 2022-06-07
Payer: COMMERCIAL

## 2022-06-07 DIAGNOSIS — Z36.89 ENCOUNTER FOR ULTRASOUND TO ASSESS FETAL GROWTH: Primary | ICD-10-CM

## 2022-06-07 DIAGNOSIS — O09.92 SUPERVISION OF HIGH RISK PREGNANCY IN SECOND TRIMESTER: ICD-10-CM

## 2022-06-07 PROCEDURE — 76811 OB US DETAILED SNGL FETUS: CPT | Mod: S$GLB,,, | Performed by: OBSTETRICS & GYNECOLOGY

## 2022-06-07 PROCEDURE — 76811 US MFM PROCEDURE (VIEWPOINT): ICD-10-PCS | Mod: S$GLB,,, | Performed by: OBSTETRICS & GYNECOLOGY

## 2022-06-30 ENCOUNTER — ROUTINE PRENATAL (OUTPATIENT)
Dept: OBSTETRICS AND GYNECOLOGY | Facility: CLINIC | Age: 37
End: 2022-06-30
Attending: OBSTETRICS & GYNECOLOGY
Payer: COMMERCIAL

## 2022-06-30 VITALS
SYSTOLIC BLOOD PRESSURE: 110 MMHG | DIASTOLIC BLOOD PRESSURE: 70 MMHG | BODY MASS INDEX: 27.71 KG/M2 | WEIGHT: 187.63 LBS

## 2022-06-30 DIAGNOSIS — Z87.59 HISTORY OF PRIOR PREGNANCY WITH IUGR NEWBORN: ICD-10-CM

## 2022-06-30 DIAGNOSIS — O09.522 MULTIGRAVIDA OF ADVANCED MATERNAL AGE IN SECOND TRIMESTER: ICD-10-CM

## 2022-06-30 DIAGNOSIS — Z87.59 HISTORY OF GESTATIONAL HYPERTENSION: ICD-10-CM

## 2022-06-30 DIAGNOSIS — O09.92 SUPERVISION OF HIGH RISK PREGNANCY IN SECOND TRIMESTER: Primary | ICD-10-CM

## 2022-06-30 LAB
BASOPHILS # BLD AUTO: 0.04 K/UL (ref 0–0.2)
BASOPHILS NFR BLD: 0.5 % (ref 0–1.9)
DIFFERENTIAL METHOD: ABNORMAL
EOSINOPHIL # BLD AUTO: 0.2 K/UL (ref 0–0.5)
EOSINOPHIL NFR BLD: 2.2 % (ref 0–8)
ERYTHROCYTE [DISTWIDTH] IN BLOOD BY AUTOMATED COUNT: 14 % (ref 11.5–14.5)
GLUCOSE SERPL-MCNC: 106 MG/DL (ref 70–140)
HCT VFR BLD AUTO: 36.1 % (ref 37–48.5)
HGB BLD-MCNC: 11.5 G/DL (ref 12–16)
IMM GRANULOCYTES # BLD AUTO: 0.05 K/UL (ref 0–0.04)
IMM GRANULOCYTES NFR BLD AUTO: 0.6 % (ref 0–0.5)
LYMPHOCYTES # BLD AUTO: 1.5 K/UL (ref 1–4.8)
LYMPHOCYTES NFR BLD: 18.1 % (ref 18–48)
MCH RBC QN AUTO: 30.4 PG (ref 27–31)
MCHC RBC AUTO-ENTMCNC: 31.9 G/DL (ref 32–36)
MCV RBC AUTO: 96 FL (ref 82–98)
MONOCYTES # BLD AUTO: 0.7 K/UL (ref 0.3–1)
MONOCYTES NFR BLD: 8.2 % (ref 4–15)
NEUTROPHILS # BLD AUTO: 5.9 K/UL (ref 1.8–7.7)
NEUTROPHILS NFR BLD: 70.4 % (ref 38–73)
NRBC BLD-RTO: 0 /100 WBC
PLATELET # BLD AUTO: 253 K/UL (ref 150–450)
PMV BLD AUTO: 11.5 FL (ref 9.2–12.9)
RBC # BLD AUTO: 3.78 M/UL (ref 4–5.4)
WBC # BLD AUTO: 8.3 K/UL (ref 3.9–12.7)

## 2022-06-30 PROCEDURE — 0502F SUBSEQUENT PRENATAL CARE: CPT | Mod: CPTII,S$GLB,, | Performed by: OBSTETRICS & GYNECOLOGY

## 2022-06-30 PROCEDURE — 99999 PR PBB SHADOW E&M-EST. PATIENT-LVL III: CPT | Mod: PBBFAC,,, | Performed by: OBSTETRICS & GYNECOLOGY

## 2022-06-30 PROCEDURE — 99999 PR PBB SHADOW E&M-EST. PATIENT-LVL III: ICD-10-PCS | Mod: PBBFAC,,, | Performed by: OBSTETRICS & GYNECOLOGY

## 2022-06-30 PROCEDURE — 0502F PR SUBSEQUENT PRENATAL CARE: ICD-10-PCS | Mod: CPTII,S$GLB,, | Performed by: OBSTETRICS & GYNECOLOGY

## 2022-06-30 PROCEDURE — 85025 COMPLETE CBC W/AUTO DIFF WBC: CPT | Performed by: OBSTETRICS & GYNECOLOGY

## 2022-06-30 PROCEDURE — 82950 GLUCOSE TEST: CPT | Performed by: OBSTETRICS & GYNECOLOGY

## 2022-06-30 NOTE — PROGRESS NOTES
Good fm.  Denies ctx, vb, lof  She complains of pubic bone pain.  Will try maternity belt.  If no relief will have her see pelvic floor PT  Paula was seen today for routine prenatal visit.    Diagnoses and all orders for this visit:    Supervision of high risk pregnancy in second trimester  -     CBC Auto Differential; Future  -     OB Glucose Screen; Future  -     CBC Auto Differential  -     OB Glucose Screen    Multigravida of advanced maternal age in second trimester    History of prior pregnancy with IUGR     History of gestational hypertension    Care and examination of lactating mother  -     Cancel: BREAST PUMP FOR HOME USE  -     BREAST PUMP FOR HOME USE

## 2022-07-06 ENCOUNTER — PATIENT MESSAGE (OUTPATIENT)
Dept: ADMINISTRATIVE | Facility: OTHER | Age: 37
End: 2022-07-06
Payer: COMMERCIAL

## 2022-07-27 ENCOUNTER — PATIENT MESSAGE (OUTPATIENT)
Dept: ADMINISTRATIVE | Facility: OTHER | Age: 37
End: 2022-07-27
Payer: COMMERCIAL

## 2022-07-27 ENCOUNTER — TELEPHONE (OUTPATIENT)
Dept: OBSTETRICS AND GYNECOLOGY | Facility: CLINIC | Age: 37
End: 2022-07-27

## 2022-07-27 ENCOUNTER — ROUTINE PRENATAL (OUTPATIENT)
Dept: OBSTETRICS AND GYNECOLOGY | Facility: CLINIC | Age: 37
End: 2022-07-27
Attending: OBSTETRICS & GYNECOLOGY
Payer: COMMERCIAL

## 2022-07-27 VITALS
BODY MASS INDEX: 28.32 KG/M2 | WEIGHT: 191.81 LBS | SYSTOLIC BLOOD PRESSURE: 112 MMHG | DIASTOLIC BLOOD PRESSURE: 68 MMHG

## 2022-07-27 DIAGNOSIS — O09.529 ANTEPARTUM MULTIGRAVIDA OF ADVANCED MATERNAL AGE: ICD-10-CM

## 2022-07-27 DIAGNOSIS — O26.899 RH NEGATIVE STATE IN ANTEPARTUM PERIOD: Primary | ICD-10-CM

## 2022-07-27 DIAGNOSIS — Z67.91 RH NEGATIVE STATE IN ANTEPARTUM PERIOD: Primary | ICD-10-CM

## 2022-07-27 PROCEDURE — 99999 PR PBB SHADOW E&M-EST. PATIENT-LVL III: ICD-10-PCS | Mod: PBBFAC,,, | Performed by: OBSTETRICS & GYNECOLOGY

## 2022-07-27 PROCEDURE — 0502F PR SUBSEQUENT PRENATAL CARE: ICD-10-PCS | Mod: CPTII,S$GLB,, | Performed by: OBSTETRICS & GYNECOLOGY

## 2022-07-27 PROCEDURE — 0502F SUBSEQUENT PRENATAL CARE: CPT | Mod: CPTII,S$GLB,, | Performed by: OBSTETRICS & GYNECOLOGY

## 2022-07-27 PROCEDURE — 99999 PR PBB SHADOW E&M-EST. PATIENT-LVL III: CPT | Mod: PBBFAC,,, | Performed by: OBSTETRICS & GYNECOLOGY

## 2022-07-27 NOTE — TELEPHONE ENCOUNTER
Called to notify patient of need for Rhogam. No answer. Left voicemail. Messaged Dr[facility]. Will get patient set up for injection.

## 2022-07-27 NOTE — PROGRESS NOTES
Here for routine OB visit. Denies LOF/VB/CTX. +FM. C/o intermittent round ligament pain. S=D. +FHTs.  MOC: partner vasectomy. Prenatal labs UTD. Will schedule for rhogam. RTC 2 weeks.

## 2022-07-28 ENCOUNTER — TELEPHONE (OUTPATIENT)
Dept: OBSTETRICS AND GYNECOLOGY | Facility: CLINIC | Age: 37
End: 2022-07-28
Payer: COMMERCIAL

## 2022-07-28 NOTE — TELEPHONE ENCOUNTER
----- Message from Cherie Mohr MD sent at 7/27/2022 11:31 AM CDT -----  Regarding: schedule rhogam injection  Please call and schedule patient for rhogam injection  Thanks  Cherie

## 2022-08-10 ENCOUNTER — CLINICAL SUPPORT (OUTPATIENT)
Dept: OBSTETRICS AND GYNECOLOGY | Facility: CLINIC | Age: 37
End: 2022-08-10
Payer: COMMERCIAL

## 2022-08-10 ENCOUNTER — ROUTINE PRENATAL (OUTPATIENT)
Dept: OBSTETRICS AND GYNECOLOGY | Facility: CLINIC | Age: 37
End: 2022-08-10
Attending: OBSTETRICS & GYNECOLOGY
Payer: COMMERCIAL

## 2022-08-10 VITALS
SYSTOLIC BLOOD PRESSURE: 118 MMHG | BODY MASS INDEX: 28.58 KG/M2 | DIASTOLIC BLOOD PRESSURE: 62 MMHG | WEIGHT: 193.56 LBS

## 2022-08-10 DIAGNOSIS — Z67.91 RH NEGATIVE STATE IN ANTEPARTUM PERIOD: Primary | ICD-10-CM

## 2022-08-10 DIAGNOSIS — O26.899 RH NEGATIVE STATE IN ANTEPARTUM PERIOD: ICD-10-CM

## 2022-08-10 DIAGNOSIS — Z23 NEED FOR DIPHTHERIA-TETANUS-PERTUSSIS (TDAP) VACCINE: ICD-10-CM

## 2022-08-10 DIAGNOSIS — O26.899 RH NEGATIVE STATE IN ANTEPARTUM PERIOD: Primary | ICD-10-CM

## 2022-08-10 DIAGNOSIS — O09.93 SUPERVISION OF HIGH RISK PREGNANCY IN THIRD TRIMESTER: Primary | ICD-10-CM

## 2022-08-10 DIAGNOSIS — Z87.59 HISTORY OF PRIOR PREGNANCY WITH IUGR NEWBORN: ICD-10-CM

## 2022-08-10 DIAGNOSIS — Z67.91 RH NEGATIVE STATE IN ANTEPARTUM PERIOD: ICD-10-CM

## 2022-08-10 DIAGNOSIS — Z87.59 HISTORY OF GESTATIONAL HYPERTENSION: ICD-10-CM

## 2022-08-10 DIAGNOSIS — O09.523 MULTIGRAVIDA OF ADVANCED MATERNAL AGE IN THIRD TRIMESTER: ICD-10-CM

## 2022-08-10 PROCEDURE — 90715 TDAP VACCINE 7 YRS/> IM: CPT | Mod: S$GLB,,, | Performed by: OBSTETRICS & GYNECOLOGY

## 2022-08-10 PROCEDURE — 0502F PR SUBSEQUENT PRENATAL CARE: ICD-10-PCS | Mod: CPTII,S$GLB,, | Performed by: OBSTETRICS & GYNECOLOGY

## 2022-08-10 PROCEDURE — 90471 TDAP VACCINE GREATER THAN OR EQUAL TO 7YO IM: ICD-10-PCS | Mod: 59,S$GLB,, | Performed by: OBSTETRICS & GYNECOLOGY

## 2022-08-10 PROCEDURE — 90715 TDAP VACCINE GREATER THAN OR EQUAL TO 7YO IM: ICD-10-PCS | Mod: S$GLB,,, | Performed by: OBSTETRICS & GYNECOLOGY

## 2022-08-10 PROCEDURE — 99999 PR PBB SHADOW E&M-EST. PATIENT-LVL II: CPT | Mod: PBBFAC,,, | Performed by: OBSTETRICS & GYNECOLOGY

## 2022-08-10 PROCEDURE — 96372 RHO (D) IMMUNE GLOBULIN: ICD-10-PCS | Mod: S$GLB,,, | Performed by: OBSTETRICS & GYNECOLOGY

## 2022-08-10 PROCEDURE — 0502F SUBSEQUENT PRENATAL CARE: CPT | Mod: CPTII,S$GLB,, | Performed by: OBSTETRICS & GYNECOLOGY

## 2022-08-10 PROCEDURE — 96372 THER/PROPH/DIAG INJ SC/IM: CPT | Mod: S$GLB,,, | Performed by: OBSTETRICS & GYNECOLOGY

## 2022-08-10 PROCEDURE — 99999 PR PBB SHADOW E&M-EST. PATIENT-LVL II: ICD-10-PCS | Mod: PBBFAC,,, | Performed by: OBSTETRICS & GYNECOLOGY

## 2022-08-10 PROCEDURE — 90471 IMMUNIZATION ADMIN: CPT | Mod: 59,S$GLB,, | Performed by: OBSTETRICS & GYNECOLOGY

## 2022-08-10 NOTE — PROGRESS NOTES
Here for rhogam injection, no complaints at this time. Verified patient is RH negative. No complaint of pain before or after injection. Patient advised to wait 15 minutes before leaving and report any adverse reactions.    Site:Right DG      Here for TDAP injection. Patient without complaint of pain at this time, Injection given. Tolerated well. No pain noted after injection.  Advised to wait in lobby 15 minutes and report any adverse reactions.     Site:left arm    Baby Friendly Handout Given to Patient

## 2022-08-10 NOTE — PROGRESS NOTES
Good fetal movement. Wil faith contractions occurring. Remaining well hydrated. Mild nausea, does not desire prescription. Taking Mg for calf cramping. Tdap and Rhogam received today.

## 2022-08-10 NOTE — LETTER
August 10, 2022    Paula Armendariz  [address] [facility] [address]              [facility]  [address], [address]  [facility] [address]  Phone: [phone]  Fax: [phone]    To Whom It May Concern:    Ms. Armendariz is currently under our care for pregnancy.  Estimated Date of Delivery:10/19/22      Sincerely,    Michael Donaldson LPN

## 2022-08-23 ENCOUNTER — PATIENT MESSAGE (OUTPATIENT)
Dept: MATERNAL FETAL MEDICINE | Facility: CLINIC | Age: 37
End: 2022-08-23
Payer: COMMERCIAL

## 2022-08-24 ENCOUNTER — PROCEDURE VISIT (OUTPATIENT)
Dept: MATERNAL FETAL MEDICINE | Facility: CLINIC | Age: 37
End: 2022-08-24
Attending: OBSTETRICS & GYNECOLOGY
Payer: COMMERCIAL

## 2022-08-24 ENCOUNTER — PATIENT MESSAGE (OUTPATIENT)
Dept: ADMINISTRATIVE | Facility: OTHER | Age: 37
End: 2022-08-24
Payer: COMMERCIAL

## 2022-08-24 DIAGNOSIS — Z36.89 ENCOUNTER FOR ULTRASOUND TO ASSESS FETAL GROWTH: ICD-10-CM

## 2022-08-24 PROCEDURE — 76816 US MFM PROCEDURE (VIEWPOINT): ICD-10-PCS | Mod: S$GLB,,, | Performed by: OBSTETRICS & GYNECOLOGY

## 2022-08-24 PROCEDURE — 76816 OB US FOLLOW-UP PER FETUS: CPT | Mod: S$GLB,,, | Performed by: OBSTETRICS & GYNECOLOGY

## 2022-09-08 ENCOUNTER — ROUTINE PRENATAL (OUTPATIENT)
Dept: OBSTETRICS AND GYNECOLOGY | Facility: CLINIC | Age: 37
End: 2022-09-08
Attending: OBSTETRICS & GYNECOLOGY
Payer: COMMERCIAL

## 2022-09-08 VITALS
WEIGHT: 198.19 LBS | DIASTOLIC BLOOD PRESSURE: 72 MMHG | BODY MASS INDEX: 29.27 KG/M2 | SYSTOLIC BLOOD PRESSURE: 124 MMHG

## 2022-09-08 DIAGNOSIS — O09.523 MULTIGRAVIDA OF ADVANCED MATERNAL AGE IN THIRD TRIMESTER: Primary | ICD-10-CM

## 2022-09-08 DIAGNOSIS — Z87.59 HISTORY OF PRIOR PREGNANCY WITH IUGR NEWBORN: ICD-10-CM

## 2022-09-08 DIAGNOSIS — O26.899 HEARTBURN DURING PREGNANCY, ANTEPARTUM: ICD-10-CM

## 2022-09-08 DIAGNOSIS — R12 HEARTBURN DURING PREGNANCY, ANTEPARTUM: ICD-10-CM

## 2022-09-08 PROCEDURE — 99999 PR PBB SHADOW E&M-EST. PATIENT-LVL III: ICD-10-PCS | Mod: PBBFAC,,, | Performed by: OBSTETRICS & GYNECOLOGY

## 2022-09-08 PROCEDURE — 0502F SUBSEQUENT PRENATAL CARE: CPT | Mod: CPTII,S$GLB,, | Performed by: OBSTETRICS & GYNECOLOGY

## 2022-09-08 PROCEDURE — 99999 PR PBB SHADOW E&M-EST. PATIENT-LVL III: CPT | Mod: PBBFAC,,, | Performed by: OBSTETRICS & GYNECOLOGY

## 2022-09-08 PROCEDURE — 0502F PR SUBSEQUENT PRENATAL CARE: ICD-10-PCS | Mod: CPTII,S$GLB,, | Performed by: OBSTETRICS & GYNECOLOGY

## 2022-09-08 RX ORDER — FAMOTIDINE 40 MG/1
40 TABLET, FILM COATED ORAL NIGHTLY
Qty: 30 TABLET | Refills: 2 | Status: SHIPPED | OUTPATIENT
Start: 2022-09-08 | End: 2023-04-03 | Stop reason: ALTCHOICE

## 2022-09-08 NOTE — PROGRESS NOTES
Pregnancy dating, labs, ultrasound reports, prenatal testing, and problem list; prior records and results; and available outside records were reviewed and updated in EMR.  Pertinent findings were noted below.    Reason for Visit   Routine Prenatal Visit    HPI   36 y.o., at 34w1d by Estimated Date of Delivery: 10/19/22    Overall doing well today. Reports increased pelvic pressure/pain, icing/rest helps. Reports heartburn associated with certain foods, mainly at night.    Contractions: Yes, irregular/infrequent BH ctx   Bleeding: No   Loss of fluid: No   Fetal movement: Yes   Nausea: No   Vomiting: No   Headache: No     Exam   /72   Wt 89.9 kg (198 lb 3.1 oz)   BMI 29.27 kg/m²     GENERAL: No acute distress  ABD: Gravid    Fundal height 34cm  FHR 162bpm    Assessment and Plan   Multigravida of advanced maternal age in third trimester    History of prior pregnancy with IUGR     Heartburn during pregnancy, antepartum  -     famotidine (PEPCID) 40 MG tablet; Take 1 tablet (40 mg total) by mouth every evening.  Dispense: 30 tablet; Refill: 2       Labs reviewed and up to date.  GBS and 3rd trimester labs on RTC.   Normal growth on 32 week u/s.   Pepcid for heartburn sent to pharmacy     labor precautions given  Follow-up: 2 weeks     Roberta Villegas MD   OB/GYN PGY1  [facility]    I have reviewed the Resident's progress note.  I have personally interviewed and examined the patient at bedside and agree with the findings as documented.  All patient questions answered.  -- CHAPIS Anaya M.D.

## 2022-09-11 ENCOUNTER — NURSE TRIAGE (OUTPATIENT)
Dept: ADMINISTRATIVE | Facility: CLINIC | Age: 37
End: 2022-09-11
Payer: COMMERCIAL

## 2022-09-12 ENCOUNTER — TELEPHONE (OUTPATIENT)
Dept: OBSTETRICS AND GYNECOLOGY | Facility: CLINIC | Age: 37
End: 2022-09-12
Payer: COMMERCIAL

## 2022-09-12 NOTE — TELEPHONE ENCOUNTER
"Pt 34 weeks pregnant. C/o vomiting, diarrhea and abdominal pain 4/10 for the last 3 hours. Pt concerned about possible dehydration. Care advice to go to LD per protocol. Verbalized understanding. Encounter routed to provider.         Reason for Disposition   MODERATE-SEVERE abdominal pain (e.g., interferes with normal activities, awakens from sleep)    Additional Information   Negative: Passed out (i.e., lost consciousness, collapsed and was not responding)   Negative: Shock suspected (e.g., cold/pale/clammy skin, too weak to stand, low BP, rapid pulse)   Negative: Difficult to awaken or acting confused (e.g., disoriented, slurred speech)   Negative: [1] SEVERE abdominal pain (e.g., excruciating) AND [2] constant AND [3] present > 1 hour   Negative: SEVERE vaginal bleeding (e.g., continuous red blood from vagina, large blood clots)   Negative: Sounds like a life-threatening emergency to the triager   Negative: [1] Vomiting AND [2] contains red blood or black ("coffee ground") material  (Exception: few red streaks in vomit that only happened once)    Protocols used: Pregnancy - Abdominal Pain Greater Than 20 Weeks EGA-A-AH    "

## 2022-09-22 ENCOUNTER — ROUTINE PRENATAL (OUTPATIENT)
Dept: OBSTETRICS AND GYNECOLOGY | Facility: CLINIC | Age: 37
End: 2022-09-22
Attending: OBSTETRICS & GYNECOLOGY
Payer: COMMERCIAL

## 2022-09-22 ENCOUNTER — LAB VISIT (OUTPATIENT)
Dept: LAB | Facility: OTHER | Age: 37
End: 2022-09-22
Attending: OBSTETRICS & GYNECOLOGY
Payer: COMMERCIAL

## 2022-09-22 VITALS — WEIGHT: 199.06 LBS | DIASTOLIC BLOOD PRESSURE: 60 MMHG | SYSTOLIC BLOOD PRESSURE: 128 MMHG | BODY MASS INDEX: 29.4 KG/M2

## 2022-09-22 DIAGNOSIS — O09.523 MULTIGRAVIDA OF ADVANCED MATERNAL AGE IN THIRD TRIMESTER: ICD-10-CM

## 2022-09-22 DIAGNOSIS — Z87.59 HISTORY OF GESTATIONAL HYPERTENSION: ICD-10-CM

## 2022-09-22 DIAGNOSIS — O09.93 SUPERVISION OF HIGH RISK PREGNANCY IN THIRD TRIMESTER: Primary | ICD-10-CM

## 2022-09-22 DIAGNOSIS — O09.93 SUPERVISION OF HIGH RISK PREGNANCY IN THIRD TRIMESTER: ICD-10-CM

## 2022-09-22 DIAGNOSIS — Z87.59 HISTORY OF PRIOR PREGNANCY WITH IUGR NEWBORN: ICD-10-CM

## 2022-09-22 LAB
BASOPHILS # BLD AUTO: 0.02 K/UL (ref 0–0.2)
BASOPHILS NFR BLD: 0.2 % (ref 0–1.9)
DIFFERENTIAL METHOD: ABNORMAL
EOSINOPHIL # BLD AUTO: 0.1 K/UL (ref 0–0.5)
EOSINOPHIL NFR BLD: 1.2 % (ref 0–8)
ERYTHROCYTE [DISTWIDTH] IN BLOOD BY AUTOMATED COUNT: 13.9 % (ref 11.5–14.5)
HCT VFR BLD AUTO: 32.4 % (ref 37–48.5)
HGB BLD-MCNC: 10.3 G/DL (ref 12–16)
HIV 1+2 AB+HIV1 P24 AG SERPL QL IA: NORMAL
IMM GRANULOCYTES # BLD AUTO: 0.08 K/UL (ref 0–0.04)
IMM GRANULOCYTES NFR BLD AUTO: 0.8 % (ref 0–0.5)
LYMPHOCYTES # BLD AUTO: 1.5 K/UL (ref 1–4.8)
LYMPHOCYTES NFR BLD: 14.9 % (ref 18–48)
MCH RBC QN AUTO: 28.9 PG (ref 27–31)
MCHC RBC AUTO-ENTMCNC: 31.8 G/DL (ref 32–36)
MCV RBC AUTO: 91 FL (ref 82–98)
MONOCYTES # BLD AUTO: 0.8 K/UL (ref 0.3–1)
MONOCYTES NFR BLD: 7.9 % (ref 4–15)
NEUTROPHILS # BLD AUTO: 7.3 K/UL (ref 1.8–7.7)
NEUTROPHILS NFR BLD: 75 % (ref 38–73)
NRBC BLD-RTO: 0 /100 WBC
PLATELET # BLD AUTO: 274 K/UL (ref 150–450)
PMV BLD AUTO: 11.6 FL (ref 9.2–12.9)
RBC # BLD AUTO: 3.57 M/UL (ref 4–5.4)
WBC # BLD AUTO: 9.72 K/UL (ref 3.9–12.7)

## 2022-09-22 PROCEDURE — 85025 COMPLETE CBC W/AUTO DIFF WBC: CPT | Performed by: OBSTETRICS & GYNECOLOGY

## 2022-09-22 PROCEDURE — 36415 COLL VENOUS BLD VENIPUNCTURE: CPT | Performed by: OBSTETRICS & GYNECOLOGY

## 2022-09-22 PROCEDURE — 86592 SYPHILIS TEST NON-TREP QUAL: CPT | Performed by: OBSTETRICS & GYNECOLOGY

## 2022-09-22 PROCEDURE — 0502F SUBSEQUENT PRENATAL CARE: CPT | Mod: CPTII,S$GLB,, | Performed by: OBSTETRICS & GYNECOLOGY

## 2022-09-22 PROCEDURE — 0502F PR SUBSEQUENT PRENATAL CARE: ICD-10-PCS | Mod: CPTII,S$GLB,, | Performed by: OBSTETRICS & GYNECOLOGY

## 2022-09-22 PROCEDURE — 99999 PR PBB SHADOW E&M-EST. PATIENT-LVL III: ICD-10-PCS | Mod: PBBFAC,,, | Performed by: OBSTETRICS & GYNECOLOGY

## 2022-09-22 PROCEDURE — 87081 CULTURE SCREEN ONLY: CPT | Performed by: OBSTETRICS & GYNECOLOGY

## 2022-09-22 PROCEDURE — 99999 PR PBB SHADOW E&M-EST. PATIENT-LVL III: CPT | Mod: PBBFAC,,, | Performed by: OBSTETRICS & GYNECOLOGY

## 2022-09-22 PROCEDURE — 87389 HIV-1 AG W/HIV-1&-2 AB AG IA: CPT | Performed by: OBSTETRICS & GYNECOLOGY

## 2022-09-22 NOTE — PROGRESS NOTES
Pregnancy dating, labs, ultrasound reports, prenatal testing, and problem list; prior records and results; and available outside records were reviewed and updated in EMR.  Pertinent findings were noted below.    Reason for Visit   Routine Prenatal Visit (Doing well) and leukocytes in urine    HPI   36 y.o., at 36w1d by Estimated Date of Delivery: 10/19/22    Overall feeling well. Was sick last week with a stomach bug that went around her family but feeling improved today. Reports feeling increased pelvic pressure, pain. Endorses consistent fetal movement, occasional contractions and mucous like discharge. She denies any headaches, LOF, vaginal bleedings.     Contractions: Yes   Bleeding: No   Loss of fluid: No   Fetal movement: Yes   Nausea: No   Vomiting: No   Headache: No     Exam   /60   Wt 90.3 kg (199 lb 1.2 oz)   BMI 29.40 kg/m²     GENERAL: No acute distress  ABD: Gravid  Fetal heart tones: 162  Fundal height: 36.5 cm  Cervix: 1 cm dilated    Assessment and Plan   Supervision of high risk pregnancy in third trimester    Multigravida of advanced maternal age in third trimester    History of prior pregnancy with IUGR     History of gestational hypertension      GBS collected today. Consents signed today. Patient had no other questions.     labor, Labor, and Pain and bleeding precautions given  Follow-up: 1 week    Roberta SANDOVAL-S2    Agree.  Paula was seen today for routine prenatal visit and leukocytes in urine.    Diagnoses and all orders for this visit:    Supervision of high risk pregnancy in third trimester  -     CBC Auto Differential; Future  -     HIV 1/2 Ag/Ab (4th Gen); Future  -     RPR; Future  -     Strep B Screen, Vaginal / Rectal  -     IP OB Labor Induction; Future    Multigravida of advanced maternal age in third trimester  -     IP OB Labor Induction; Future    History of prior pregnancy with IUGR   -     IP OB Labor Induction; Future    History of gestational  hypertension  -     IP OB Labor Induction; Future

## 2022-09-23 LAB — RPR SER QL: NORMAL

## 2022-09-26 LAB — BACTERIA SPEC AEROBE CULT: NORMAL

## 2022-09-28 ENCOUNTER — ROUTINE PRENATAL (OUTPATIENT)
Dept: OBSTETRICS AND GYNECOLOGY | Facility: CLINIC | Age: 37
End: 2022-09-28
Attending: OBSTETRICS & GYNECOLOGY
Payer: COMMERCIAL

## 2022-09-28 VITALS
WEIGHT: 200.38 LBS | SYSTOLIC BLOOD PRESSURE: 122 MMHG | DIASTOLIC BLOOD PRESSURE: 70 MMHG | BODY MASS INDEX: 29.59 KG/M2

## 2022-09-28 DIAGNOSIS — Z86.59 HISTORY OF POSTPARTUM DEPRESSION, CURRENTLY PREGNANT: ICD-10-CM

## 2022-09-28 DIAGNOSIS — Z87.59 HISTORY OF PRIOR PREGNANCY WITH IUGR NEWBORN: ICD-10-CM

## 2022-09-28 DIAGNOSIS — O09.93 SUPERVISION OF HIGH RISK PREGNANCY IN THIRD TRIMESTER: Primary | ICD-10-CM

## 2022-09-28 DIAGNOSIS — Z67.91 RH NEGATIVE STATE IN ANTEPARTUM PERIOD: ICD-10-CM

## 2022-09-28 DIAGNOSIS — Z87.59 HISTORY OF GESTATIONAL HYPERTENSION: ICD-10-CM

## 2022-09-28 DIAGNOSIS — O99.891 HISTORY OF POSTPARTUM DEPRESSION, CURRENTLY PREGNANT: ICD-10-CM

## 2022-09-28 DIAGNOSIS — O09.523 MULTIGRAVIDA OF ADVANCED MATERNAL AGE IN THIRD TRIMESTER: ICD-10-CM

## 2022-09-28 DIAGNOSIS — O26.899 RH NEGATIVE STATE IN ANTEPARTUM PERIOD: ICD-10-CM

## 2022-09-28 DIAGNOSIS — Z23 NEEDS FLU SHOT: ICD-10-CM

## 2022-09-28 PROCEDURE — 90686 FLU VACCINE (QUAD) GREATER THAN OR EQUAL TO 3YO PRESERVATIVE FREE IM: ICD-10-PCS | Mod: S$GLB,,, | Performed by: OBSTETRICS & GYNECOLOGY

## 2022-09-28 PROCEDURE — 99999 PR PBB SHADOW E&M-EST. PATIENT-LVL III: CPT | Mod: PBBFAC,,, | Performed by: OBSTETRICS & GYNECOLOGY

## 2022-09-28 PROCEDURE — 90686 IIV4 VACC NO PRSV 0.5 ML IM: CPT | Mod: S$GLB,,, | Performed by: OBSTETRICS & GYNECOLOGY

## 2022-09-28 PROCEDURE — 90471 FLU VACCINE (QUAD) GREATER THAN OR EQUAL TO 3YO PRESERVATIVE FREE IM: ICD-10-PCS | Mod: S$GLB,,, | Performed by: OBSTETRICS & GYNECOLOGY

## 2022-09-28 PROCEDURE — 0502F PR SUBSEQUENT PRENATAL CARE: ICD-10-PCS | Mod: CPTII,S$GLB,, | Performed by: OBSTETRICS & GYNECOLOGY

## 2022-09-28 PROCEDURE — 90471 IMMUNIZATION ADMIN: CPT | Mod: S$GLB,,, | Performed by: OBSTETRICS & GYNECOLOGY

## 2022-09-28 PROCEDURE — 99999 PR PBB SHADOW E&M-EST. PATIENT-LVL III: ICD-10-PCS | Mod: PBBFAC,,, | Performed by: OBSTETRICS & GYNECOLOGY

## 2022-09-28 PROCEDURE — 0502F SUBSEQUENT PRENATAL CARE: CPT | Mod: CPTII,S$GLB,, | Performed by: OBSTETRICS & GYNECOLOGY

## 2022-09-28 NOTE — PROGRESS NOTES
Good fm.  Denies vb, lof.  Some ctx.  Declines cervical exam.  Desires flu shot.  Vertex verified.  Diagnoses and all orders for this visit:    Supervision of high risk pregnancy in third trimester    Multigravida of advanced maternal age in third trimester    History of gestational hypertension    History of postpartum depression, currently pregnant    History of prior pregnancy with IUGR     Rh negative state in antepartum period

## 2022-10-05 ENCOUNTER — ROUTINE PRENATAL (OUTPATIENT)
Dept: OBSTETRICS AND GYNECOLOGY | Facility: CLINIC | Age: 37
End: 2022-10-05
Attending: OBSTETRICS & GYNECOLOGY
Payer: COMMERCIAL

## 2022-10-05 VITALS
BODY MASS INDEX: 29.56 KG/M2 | WEIGHT: 200.19 LBS | DIASTOLIC BLOOD PRESSURE: 76 MMHG | SYSTOLIC BLOOD PRESSURE: 102 MMHG

## 2022-10-05 DIAGNOSIS — O09.93 SUPERVISION OF HIGH RISK PREGNANCY IN THIRD TRIMESTER: Primary | ICD-10-CM

## 2022-10-05 PROCEDURE — 99999 PR PBB SHADOW E&M-EST. PATIENT-LVL III: CPT | Mod: PBBFAC,,, | Performed by: OBSTETRICS & GYNECOLOGY

## 2022-10-05 PROCEDURE — 99999 PR PBB SHADOW E&M-EST. PATIENT-LVL III: ICD-10-PCS | Mod: PBBFAC,,, | Performed by: OBSTETRICS & GYNECOLOGY

## 2022-10-05 PROCEDURE — 0502F SUBSEQUENT PRENATAL CARE: CPT | Mod: CPTII,S$GLB,, | Performed by: OBSTETRICS & GYNECOLOGY

## 2022-10-05 PROCEDURE — 0502F PR SUBSEQUENT PRENATAL CARE: ICD-10-PCS | Mod: CPTII,S$GLB,, | Performed by: OBSTETRICS & GYNECOLOGY

## 2022-10-05 NOTE — PROGRESS NOTES
Pregnancy dating, labs, ultrasound reports, prenatal testing, and problem list; prior records and results; and available outside records were reviewed and updated in EMR.  Pertinent findings were noted below.    Reason for Visit: Routine Prenatal Visit    38w0d by Estimated Date of Delivery: 10/19/22    Blood pressure 102/76, weight 90.8 kg (200 lb 2.8 oz), currently breastfeeding.    Supervision of high risk pregnancy in third trimester    No contractions, bleeding, or loss of fluid.  +fetal movement.  Labs reviewed and up to date.    Labor, Pain and bleeding, preE, and fetal movement count precautions given  Follow-up: 1 week

## 2022-10-10 ENCOUNTER — HOSPITAL ENCOUNTER (INPATIENT)
Facility: OTHER | Age: 37
LOS: 1 days | Discharge: HOME | End: 2022-10-11
Attending: STUDENT IN AN ORGANIZED HEALTH CARE EDUCATION/TRAINING PROGRAM | Admitting: STUDENT IN AN ORGANIZED HEALTH CARE EDUCATION/TRAINING PROGRAM
Payer: COMMERCIAL

## 2022-10-10 ENCOUNTER — ANESTHESIA EVENT (OUTPATIENT)
Dept: OBSTETRICS AND GYNECOLOGY | Facility: OTHER | Age: 37
End: 2022-10-10
Payer: COMMERCIAL

## 2022-10-10 ENCOUNTER — ANESTHESIA (OUTPATIENT)
Dept: OBSTETRICS AND GYNECOLOGY | Facility: OTHER | Age: 37
End: 2022-10-10
Payer: COMMERCIAL

## 2022-10-10 DIAGNOSIS — Z3A.38 38 WEEKS GESTATION OF PREGNANCY: ICD-10-CM

## 2022-10-10 DIAGNOSIS — Z37.9 NORMAL LABOR: Primary | ICD-10-CM

## 2022-10-10 LAB
ABO + RH BLD: ABNORMAL
BASOPHILS # BLD AUTO: 0.07 K/UL (ref 0–0.2)
BASOPHILS NFR BLD: 0.7 % (ref 0–1.9)
BLD GP AB SCN CELLS X3 SERPL QL: ABNORMAL
BLOOD GROUP ANTIBODIES SERPL: NORMAL
DIFFERENTIAL METHOD: ABNORMAL
EOSINOPHIL # BLD AUTO: 0.2 K/UL (ref 0–0.5)
EOSINOPHIL NFR BLD: 2.2 % (ref 0–8)
ERYTHROCYTE [DISTWIDTH] IN BLOOD BY AUTOMATED COUNT: 14.1 % (ref 11.5–14.5)
HCT VFR BLD AUTO: 33.3 % (ref 37–48.5)
HGB BLD-MCNC: 10.8 G/DL (ref 12–16)
IMM GRANULOCYTES # BLD AUTO: 0.09 K/UL (ref 0–0.04)
IMM GRANULOCYTES NFR BLD AUTO: 0.9 % (ref 0–0.5)
LYMPHOCYTES # BLD AUTO: 2.2 K/UL (ref 1–4.8)
LYMPHOCYTES NFR BLD: 21 % (ref 18–48)
MCH RBC QN AUTO: 28.2 PG (ref 27–31)
MCHC RBC AUTO-ENTMCNC: 32.4 G/DL (ref 32–36)
MCV RBC AUTO: 87 FL (ref 82–98)
MONOCYTES # BLD AUTO: 0.8 K/UL (ref 0.3–1)
MONOCYTES NFR BLD: 7.8 % (ref 4–15)
NEUTROPHILS # BLD AUTO: 7.1 K/UL (ref 1.8–7.7)
NEUTROPHILS NFR BLD: 67.4 % (ref 38–73)
NRBC BLD-RTO: 0 /100 WBC
PLATELET # BLD AUTO: 280 K/UL (ref 150–450)
PMV BLD AUTO: 11.3 FL (ref 9.2–12.9)
RBC # BLD AUTO: 3.83 M/UL (ref 4–5.4)
WBC # BLD AUTO: 10.48 K/UL (ref 3.9–12.7)

## 2022-10-10 PROCEDURE — 25000003 PHARM REV CODE 250: Performed by: STUDENT IN AN ORGANIZED HEALTH CARE EDUCATION/TRAINING PROGRAM

## 2022-10-10 PROCEDURE — 11000001 HC ACUTE MED/SURG PRIVATE ROOM

## 2022-10-10 PROCEDURE — 72200004 HC VAGINAL DELIVERY LEVEL I

## 2022-10-10 PROCEDURE — 25000003 PHARM REV CODE 250: Performed by: ANESTHESIOLOGY

## 2022-10-10 PROCEDURE — 99284 EMERGENCY DEPT VISIT MOD MDM: CPT | Mod: 25

## 2022-10-10 PROCEDURE — 59025 FETAL NON-STRESS TEST: CPT

## 2022-10-10 PROCEDURE — 86870 RBC ANTIBODY IDENTIFICATION: CPT

## 2022-10-10 PROCEDURE — 59400 OBSTETRICAL CARE: CPT | Mod: AT,,, | Performed by: OBSTETRICS & GYNECOLOGY

## 2022-10-10 PROCEDURE — 99283 PR EMERGENCY DEPT VISIT,LEVEL III: ICD-10-PCS | Mod: 25,,, | Performed by: STUDENT IN AN ORGANIZED HEALTH CARE EDUCATION/TRAINING PROGRAM

## 2022-10-10 PROCEDURE — 59025 PR FETAL 2N-STRESS TEST: ICD-10-PCS | Mod: 26,,, | Performed by: STUDENT IN AN ORGANIZED HEALTH CARE EDUCATION/TRAINING PROGRAM

## 2022-10-10 PROCEDURE — 99283 EMERGENCY DEPT VISIT LOW MDM: CPT | Mod: 25,,, | Performed by: STUDENT IN AN ORGANIZED HEALTH CARE EDUCATION/TRAINING PROGRAM

## 2022-10-10 PROCEDURE — 27200710 HC EPIDURAL INFUSION PUMP SET: Performed by: ANESTHESIOLOGY

## 2022-10-10 PROCEDURE — 59025 FETAL NON-STRESS TEST: CPT | Mod: 26,,, | Performed by: STUDENT IN AN ORGANIZED HEALTH CARE EDUCATION/TRAINING PROGRAM

## 2022-10-10 PROCEDURE — 76815 PR  US,PREGNANT UTERUS,LIMITED, 1/> FETUSES: ICD-10-PCS | Mod: 26,,, | Performed by: STUDENT IN AN ORGANIZED HEALTH CARE EDUCATION/TRAINING PROGRAM

## 2022-10-10 PROCEDURE — 59025 FETAL NON-STRESS TEST: CPT | Mod: 26,,,

## 2022-10-10 PROCEDURE — C1751 CATH, INF, PER/CENT/MIDLINE: HCPCS | Performed by: ANESTHESIOLOGY

## 2022-10-10 PROCEDURE — 86901 BLOOD TYPING SEROLOGIC RH(D): CPT

## 2022-10-10 PROCEDURE — 59400 OBSTETRICAL CARE: CPT | Mod: QY,,, | Performed by: ANESTHESIOLOGY

## 2022-10-10 PROCEDURE — 59400 PRA FULL ROUT OBSTE CARE,VAGINAL DELIV: ICD-10-PCS | Mod: QY,,, | Performed by: ANESTHESIOLOGY

## 2022-10-10 PROCEDURE — 63600175 PHARM REV CODE 636 W HCPCS: Performed by: ANESTHESIOLOGY

## 2022-10-10 PROCEDURE — 59400 PR FULL ROUT OBSTE CARE,VAGINAL DELIV: ICD-10-PCS | Mod: AT,,, | Performed by: OBSTETRICS & GYNECOLOGY

## 2022-10-10 PROCEDURE — 59025 FETAL NON-STRESS TEST: ICD-10-PCS | Mod: 26,,,

## 2022-10-10 PROCEDURE — 62326 NJX INTERLAMINAR LMBR/SAC: CPT | Performed by: ANESTHESIOLOGY

## 2022-10-10 PROCEDURE — 85025 COMPLETE CBC W/AUTO DIFF WBC: CPT

## 2022-10-10 PROCEDURE — 76815 OB US LIMITED FETUS(S): CPT | Mod: 26,,, | Performed by: STUDENT IN AN ORGANIZED HEALTH CARE EDUCATION/TRAINING PROGRAM

## 2022-10-10 PROCEDURE — 72100002 HC LABOR CARE, 1ST 8 HOURS

## 2022-10-10 RX ORDER — SODIUM CHLORIDE, SODIUM LACTATE, POTASSIUM CHLORIDE, CALCIUM CHLORIDE 600; 310; 30; 20 MG/100ML; MG/100ML; MG/100ML; MG/100ML
INJECTION, SOLUTION INTRAVENOUS CONTINUOUS
Status: DISCONTINUED | OUTPATIENT
Start: 2022-10-10 | End: 2022-10-11 | Stop reason: HOSPADM

## 2022-10-10 RX ORDER — OXYTOCIN/RINGER'S LACTATE 30/500 ML
95 PLASTIC BAG, INJECTION (ML) INTRAVENOUS ONCE
Status: DISCONTINUED | OUTPATIENT
Start: 2022-10-10 | End: 2022-10-11 | Stop reason: HOSPADM

## 2022-10-10 RX ORDER — ACETAMINOPHEN 325 MG/1
650 TABLET ORAL EVERY 6 HOURS PRN
Status: DISCONTINUED | OUTPATIENT
Start: 2022-10-10 | End: 2022-10-11 | Stop reason: HOSPADM

## 2022-10-10 RX ORDER — ONDANSETRON 8 MG/1
8 TABLET, ORALLY DISINTEGRATING ORAL EVERY 8 HOURS PRN
Status: CANCELLED | OUTPATIENT
Start: 2022-10-10

## 2022-10-10 RX ORDER — TRANEXAMIC ACID 100 MG/ML
1000 INJECTION, SOLUTION INTRAVENOUS ONCE AS NEEDED
Status: DISCONTINUED | OUTPATIENT
Start: 2022-10-10 | End: 2022-10-11 | Stop reason: HOSPADM

## 2022-10-10 RX ORDER — SODIUM CHLORIDE 0.9 % (FLUSH) 0.9 %
10 SYRINGE (ML) INJECTION
Status: CANCELLED | OUTPATIENT
Start: 2022-10-10

## 2022-10-10 RX ORDER — SERTRALINE HYDROCHLORIDE 50 MG/1
50 TABLET, FILM COATED ORAL DAILY
Status: DISCONTINUED | OUTPATIENT
Start: 2022-10-10 | End: 2022-10-11 | Stop reason: HOSPADM

## 2022-10-10 RX ORDER — FENTANYL CITRATE 50 UG/ML
INJECTION, SOLUTION INTRAMUSCULAR; INTRAVENOUS
Status: DISCONTINUED | OUTPATIENT
Start: 2022-10-10 | End: 2022-10-10

## 2022-10-10 RX ORDER — HYDROCORTISONE 25 MG/G
CREAM TOPICAL 3 TIMES DAILY PRN
Status: DISCONTINUED | OUTPATIENT
Start: 2022-10-10 | End: 2022-10-11 | Stop reason: HOSPADM

## 2022-10-10 RX ORDER — FENTANYL/BUPIVACAINE/NS/PF 2MCG/ML-.1
PLASTIC BAG, INJECTION (ML) INJECTION CONTINUOUS
Status: CANCELLED | OUTPATIENT
Start: 2022-10-10

## 2022-10-10 RX ORDER — SODIUM CITRATE AND CITRIC ACID MONOHYDRATE 334; 500 MG/5ML; MG/5ML
30 SOLUTION ORAL ONCE
Status: CANCELLED | OUTPATIENT
Start: 2022-10-10 | End: 2022-10-10

## 2022-10-10 RX ORDER — HYDROCODONE BITARTRATE AND ACETAMINOPHEN 5; 325 MG/1; MG/1
1 TABLET ORAL EVERY 4 HOURS PRN
Status: DISCONTINUED | OUTPATIENT
Start: 2022-10-10 | End: 2022-10-11 | Stop reason: HOSPADM

## 2022-10-10 RX ORDER — BUPIVACAINE HYDROCHLORIDE 2.5 MG/ML
INJECTION, SOLUTION INFILTRATION; PERINEURAL
Status: DISCONTINUED | OUTPATIENT
Start: 2022-10-10 | End: 2022-10-10

## 2022-10-10 RX ORDER — DOCUSATE SODIUM 100 MG/1
200 CAPSULE, LIQUID FILLED ORAL 2 TIMES DAILY PRN
Status: DISCONTINUED | OUTPATIENT
Start: 2022-10-10 | End: 2022-10-11 | Stop reason: HOSPADM

## 2022-10-10 RX ORDER — OXYTOCIN/RINGER'S LACTATE 30/500 ML
334 PLASTIC BAG, INJECTION (ML) INTRAVENOUS ONCE
Status: DISCONTINUED | OUTPATIENT
Start: 2022-10-10 | End: 2022-10-11 | Stop reason: HOSPADM

## 2022-10-10 RX ORDER — FAMOTIDINE 10 MG/ML
20 INJECTION INTRAVENOUS ONCE
Status: CANCELLED | OUTPATIENT
Start: 2022-10-10 | End: 2022-10-10

## 2022-10-10 RX ORDER — BUPIVACAINE HYDROCHLORIDE 2.5 MG/ML
INJECTION, SOLUTION EPIDURAL; INFILTRATION; INTRACAUDAL
Status: DISPENSED
Start: 2022-10-10 | End: 2022-10-10

## 2022-10-10 RX ORDER — OXYTOCIN 10 [USP'U]/ML
10 INJECTION, SOLUTION INTRAMUSCULAR; INTRAVENOUS
Status: DISCONTINUED | OUTPATIENT
Start: 2022-10-10 | End: 2022-10-11 | Stop reason: HOSPADM

## 2022-10-10 RX ORDER — MISOPROSTOL 200 UG/1
800 TABLET ORAL
Status: DISCONTINUED | OUTPATIENT
Start: 2022-10-10 | End: 2022-10-11 | Stop reason: HOSPADM

## 2022-10-10 RX ORDER — PRENATAL WITH FERROUS FUM AND FOLIC ACID 3080; 920; 120; 400; 22; 1.84; 3; 20; 10; 1; 12; 200; 27; 25; 2 [IU]/1; [IU]/1; MG/1; [IU]/1; MG/1; MG/1; MG/1; MG/1; MG/1; MG/1; UG/1; MG/1; MG/1; MG/1; MG/1
1 TABLET ORAL DAILY
Status: DISCONTINUED | OUTPATIENT
Start: 2022-10-10 | End: 2022-10-11 | Stop reason: HOSPADM

## 2022-10-10 RX ORDER — LIDOCAINE HYDROCHLORIDE 10 MG/ML
10 INJECTION INFILTRATION; PERINEURAL ONCE AS NEEDED
Status: DISCONTINUED | OUTPATIENT
Start: 2022-10-10 | End: 2022-10-11 | Stop reason: HOSPADM

## 2022-10-10 RX ORDER — DIPHENHYDRAMINE HYDROCHLORIDE 50 MG/ML
25 INJECTION INTRAMUSCULAR; INTRAVENOUS EVERY 4 HOURS PRN
Status: DISCONTINUED | OUTPATIENT
Start: 2022-10-10 | End: 2022-10-11 | Stop reason: HOSPADM

## 2022-10-10 RX ORDER — SODIUM CHLORIDE 9 MG/ML
INJECTION, SOLUTION INTRAVENOUS
Status: DISCONTINUED | OUTPATIENT
Start: 2022-10-10 | End: 2022-10-11 | Stop reason: HOSPADM

## 2022-10-10 RX ORDER — FENTANYL/BUPIVACAINE/NS/PF 2MCG/ML-.1
PLASTIC BAG, INJECTION (ML) INJECTION
Status: COMPLETED
Start: 2022-10-10 | End: 2022-10-10

## 2022-10-10 RX ORDER — DIPHENOXYLATE HYDROCHLORIDE AND ATROPINE SULFATE 2.5; .025 MG/1; MG/1
1 TABLET ORAL 4 TIMES DAILY PRN
Status: DISCONTINUED | OUTPATIENT
Start: 2022-10-10 | End: 2022-10-11 | Stop reason: HOSPADM

## 2022-10-10 RX ORDER — FENTANYL/BUPIVACAINE/NS/PF 2MCG/ML-.1
PLASTIC BAG, INJECTION (ML) INJECTION CONTINUOUS PRN
Status: DISCONTINUED | OUTPATIENT
Start: 2022-10-10 | End: 2022-10-10

## 2022-10-10 RX ORDER — CALCIUM CARBONATE 200(500)MG
500 TABLET,CHEWABLE ORAL 3 TIMES DAILY PRN
Status: DISCONTINUED | OUTPATIENT
Start: 2022-10-10 | End: 2022-10-11 | Stop reason: HOSPADM

## 2022-10-10 RX ORDER — IBUPROFEN 600 MG/1
600 TABLET ORAL EVERY 6 HOURS PRN
Status: DISCONTINUED | OUTPATIENT
Start: 2022-10-10 | End: 2022-10-11 | Stop reason: HOSPADM

## 2022-10-10 RX ORDER — METHYLERGONOVINE MALEATE 0.2 MG/ML
200 INJECTION INTRAVENOUS
Status: DISCONTINUED | OUTPATIENT
Start: 2022-10-10 | End: 2022-10-11 | Stop reason: HOSPADM

## 2022-10-10 RX ORDER — DIPHENHYDRAMINE HCL 25 MG
25 CAPSULE ORAL EVERY 4 HOURS PRN
Status: DISCONTINUED | OUTPATIENT
Start: 2022-10-10 | End: 2022-10-11 | Stop reason: HOSPADM

## 2022-10-10 RX ORDER — PROCHLORPERAZINE EDISYLATE 5 MG/ML
5 INJECTION INTRAMUSCULAR; INTRAVENOUS EVERY 6 HOURS PRN
Status: DISCONTINUED | OUTPATIENT
Start: 2022-10-10 | End: 2022-10-11 | Stop reason: HOSPADM

## 2022-10-10 RX ORDER — SIMETHICONE 80 MG
1 TABLET,CHEWABLE ORAL EVERY 6 HOURS PRN
Status: DISCONTINUED | OUTPATIENT
Start: 2022-10-10 | End: 2022-10-11 | Stop reason: HOSPADM

## 2022-10-10 RX ORDER — PROCHLORPERAZINE EDISYLATE 5 MG/ML
5 INJECTION INTRAMUSCULAR; INTRAVENOUS EVERY 6 HOURS PRN
Status: CANCELLED | OUTPATIENT
Start: 2022-10-10

## 2022-10-10 RX ORDER — SIMETHICONE 80 MG
1 TABLET,CHEWABLE ORAL 4 TIMES DAILY PRN
Status: DISCONTINUED | OUTPATIENT
Start: 2022-10-10 | End: 2022-10-10 | Stop reason: SDUPTHER

## 2022-10-10 RX ORDER — ONDANSETRON 8 MG/1
8 TABLET, ORALLY DISINTEGRATING ORAL EVERY 8 HOURS PRN
Status: DISCONTINUED | OUTPATIENT
Start: 2022-10-10 | End: 2022-10-11 | Stop reason: HOSPADM

## 2022-10-10 RX ORDER — FENTANYL CITRATE 50 UG/ML
INJECTION, SOLUTION INTRAMUSCULAR; INTRAVENOUS
Status: COMPLETED
Start: 2022-10-10 | End: 2022-10-10

## 2022-10-10 RX ORDER — CARBOPROST TROMETHAMINE 250 UG/ML
250 INJECTION, SOLUTION INTRAMUSCULAR
Status: DISCONTINUED | OUTPATIENT
Start: 2022-10-10 | End: 2022-10-11 | Stop reason: HOSPADM

## 2022-10-10 RX ORDER — HYDROCODONE BITARTRATE AND ACETAMINOPHEN 10; 325 MG/1; MG/1
1 TABLET ORAL EVERY 4 HOURS PRN
Status: DISCONTINUED | OUTPATIENT
Start: 2022-10-10 | End: 2022-10-11 | Stop reason: HOSPADM

## 2022-10-10 RX ORDER — CEFAZOLIN SODIUM 2 G/50ML
2 SOLUTION INTRAVENOUS ONCE AS NEEDED
Status: DISCONTINUED | OUTPATIENT
Start: 2022-10-10 | End: 2022-10-11 | Stop reason: HOSPADM

## 2022-10-10 RX ADMIN — Medication 10 ML/HR: at 06:10

## 2022-10-10 RX ADMIN — FENTANYL CITRATE 90 MCG: 50 INJECTION, SOLUTION INTRAMUSCULAR; INTRAVENOUS at 06:10

## 2022-10-10 RX ADMIN — BUPIVACAINE HYDROCHLORIDE 1 ML: 2.5 INJECTION, SOLUTION INFILTRATION; PERINEURAL at 06:10

## 2022-10-10 RX ADMIN — IBUPROFEN 600 MG: 600 TABLET ORAL at 06:10

## 2022-10-10 RX ADMIN — IBUPROFEN 600 MG: 600 TABLET ORAL at 01:10

## 2022-10-10 RX ADMIN — FENTANYL CITRATE 10 MCG: 50 INJECTION, SOLUTION INTRAMUSCULAR; INTRAVENOUS at 06:10

## 2022-10-10 NOTE — ED PROVIDER NOTES
Encounter Date: 10/10/2022       History     Chief Complaint   Patient presents with    Contractions     Paula Armendariz is a 36 y.o. [id number] at 38w5d presents complaining of contractions every 3 mins that started at 3:30am. Presents with SO.     This IUP is complicated by Rh neg, AMA, hx PPD, and anemia.    Patient reports contractions, denies vaginal bleeding, denies LOF.   Fetal Movement: normal      Review of patient's allergies indicates:   Allergen Reactions    Adhesive Rash     Past Medical History:   Diagnosis Date    PCOS (polycystic ovarian syndrome)      Past Surgical History:   Procedure Laterality Date    TONSILLECTOMY       Family History   Problem Relation Age of Onset    Diabetes Maternal Grandmother     Diabetes Maternal Grandfather     Ovarian cancer Neg Hx     Eclampsia Neg Hx      Social History     Tobacco Use    Smoking status: Never    Smokeless tobacco: Never   Substance Use Topics    Alcohol use: Not Currently     Comment: socially    Drug use: No     Review of Systems   Constitutional:  Negative for chills and fever.   HENT:  Negative for congestion and sore throat.    Eyes:  Negative for visual disturbance.   Respiratory:  Negative for cough and shortness of breath.    Cardiovascular:  Negative for chest pain, palpitations and leg swelling.   Gastrointestinal:  Positive for abdominal pain (contractions). Negative for constipation, diarrhea, nausea and vomiting.   Genitourinary:  Negative for dysuria, frequency, vaginal bleeding and vaginal discharge.   Musculoskeletal:  Positive for back pain (contractions).   Skin:  Negative for rash.   Neurological:  Negative for dizziness and headaches.   Hematological:  Does not bruise/bleed easily.   Psychiatric/Behavioral:  The patient is not nervous/anxious.      Physical Exam     Initial Vitals   BP Pulse Resp Temp SpO2   10/10/22 0609 10/10/22 0614 10/10/22 0616 10/10/22 0616 10/10/22 0606   120/83 79 18 97.6 °F (36.4 °C) 100 %      MAP        --                Physical Exam    Constitutional: She appears well-developed and well-nourished. She is not diaphoretic. No distress.   HENT:   Head: Normocephalic and atraumatic.   Eyes: Conjunctivae are normal.   Neck:   Normal range of motion.  Cardiovascular:  Normal rate.           Pulmonary/Chest: No respiratory distress.   Abdominal: She exhibits no distension. There is no abdominal tenderness.   gravid   Musculoskeletal:         General: Normal range of motion.      Cervical back: Normal range of motion.     Neurological: She is alert and oriented to person, place, and time.   Skin: Skin is warm and dry.   Psychiatric: She has a normal mood and affect.       ED Course   Fetal non-stress test    Date/Time: 10/10/2022 6:40 AM  Performed by: Angelita Winston MD  Authorized by: Lupe Reed MD     Nonstress Test:     Variability:  6-25 BPM    Decelerations:  None    Acoustic Stimulator: No      Baseline:  140    Uterine Irritability: No      Contractions:  Regular    Contraction Frequency:  2-3 mins  Biophysical Profile:     Nonstress Test Interpretation: reactive      Overall Impression:  Reassuring  Post-procedure:     Patient tolerance:  Patient tolerated the procedure well with no immediate complications     Patient was quickly moved from OB ED to L&D.   Labs Reviewed - No data to display       Imaging Results    None          Medications   sertraline tablet 50 mg (has no administration in time range)   lactated ringers bolus 1,000 mL (has no administration in time range)   lactated ringers bolus 500 mL (has no administration in time range)   lactated ringers infusion (has no administration in time range)   0.9%  NaCl infusion (has no administration in time range)   oxytocin 30 units in 500 mL lactated ringers infusion (non-titrating) (has no administration in time range)   oxytocin 30 units in 500 mL lactated ringers infusion (non-titrating) (has no administration in time range)   oxytocin injection  10 Units (has no administration in time range)   carboprost injection 250 mcg (has no administration in time range)   methylergonovine injection 200 mcg (has no administration in time range)   miSOPROStoL tablet 800 mcg (has no administration in time range)   diphenoxylate-atropine 2.5-0.025 mg per tablet 1 tablet (has no administration in time range)   tranexamic acid (CYKLOKAPRON) 1,000 mg in sodium chloride 0.9 % 100 mL (ready to mix system) (has no administration in time range)   cefazolin (ANCEF) 2 gram in dextrose 5% 50 mL IVPB (premix) (has no administration in time range)   azithromycin 500 mg in dextrose 5 % 250 mL IVPB (ready to mix system) (has no administration in time range)   ondansetron disintegrating tablet 8 mg (has no administration in time range)   prochlorperazine injection Soln 5 mg (has no administration in time range)   acetaminophen tablet 650 mg (has no administration in time range)   calcium carbonate 200 mg calcium (500 mg) chewable tablet 500 mg (has no administration in time range)   LIDOcaine HCL 10 mg/ml (1%) injection 10 mL (has no administration in time range)   BUPivacaine (PF) 0.25% (2.5 mg/ml) (MARCAINE) 0.25 % (2.5 mg/mL) injection (has no administration in time range)   oxytocin 30 units in 500 mL lactated ringers infusion (non-titrating) (has no administration in time range)   ibuprofen tablet 600 mg (600 mg Oral Given 10/10/22 1306)   acetaminophen tablet 650 mg (has no administration in time range)   HYDROcodone-acetaminophen 5-325 mg per tablet 1 tablet (has no administration in time range)   HYDROcodone-acetaminophen  mg per tablet 1 tablet (has no administration in time range)   lanolin cream (has no administration in time range)   benzocaine-lanolin (DERMOPLAST) topical spray (has no administration in time range)   hydrocortisone 2.5 % rectal cream (has no administration in time range)   docusate sodium capsule 200 mg (has no administration in time range)    simethicone chewable tablet 80 mg (has no administration in time range)   diphenhydrAMINE capsule 25 mg (has no administration in time range)   diphenhydrAMINE injection 25 mg (has no administration in time range)   prenatal vitamin oral tablet (1 tablet Oral Not Given 10/10/22 1300)   acetaminophen tablet 650 mg (has no administration in time range)   measles, mumps and rubella vaccine 1,000-12,500 TCID50/0.5 mL injection 0.5 mL (has no administration in time range)   Tdap vaccine injection 0.5 mL (has no administration in time range)   fentaNYL (SUBLIMAZE) 50 mcg/mL injection (  Override pull for Anesthesia 10/10/22 0006)   fentanyl 2mcg/mL with BUPivacaine 0.1% in sdoium chloride 0.9% Epidural 2 mcg/mL- 0.1 % Soln (  Override pull for Anesthesia 10/10/22 4726)     Medical Decision Making:   ED Management:  -VSS  -NST reassuring, with moderate variability and no decels, toco with contractions q 3 mins  -SVE: 5/60/-1 and intact  -Vtx on BSUS confirmed  -No evidence of infection, abruption, or ROM at this time  -Admit to L&D for normal labor  -CBC and T&S drawn  -Consents signed and to chart            Attending Attestation:   Physician Attestation Statement for Resident:  As the supervising MD   Physician Attestation Statement: I have personally seen and examined this patient.   I agree with the above history.  -:   As the supervising MD I agree with the above PE.     As the supervising MD I agree with the above treatment, course, plan, and disposition.   I was personally present during the critical portions of the procedure(s) performed by the resident and was immediately available in the ED to provide services and assistance as needed during the entire procedure.  I have reviewed and agree with the residents interpretation of the following: rhythm strips.  I have reviewed the following: old records at this facility.                           Clinical Impression:   Final diagnoses:  [O80, Z37.9] Normal labor  (Primary)  [Z3A.38] 38 weeks gestation of pregnancy            Angelita Winston MD  [facility]   OBGYN PGY1       Angelita Winston MD  Resident  10/10/22 1800

## 2022-10-10 NOTE — PROGRESS NOTES
LABOR NOTE    Resident to bedside for routine cervical check.    S:  Complaints: No.  Epidural working:  yes      O: /83   Pulse 99   Temp 97.6 °F (36.4 °C) (Oral)   Resp 18   SpO2 100%   Breastfeeding No       FHT: Cat 1 (reassuring), baseline 145, mod kavya, + accels, - decels  CTX: q 1-3 minutes  SVE: /-1, AROM mec    TIMELINE:  0630: /-1  Epidural   0700: /-2  0900: /-1, AROM mec    A/P: 36 y.o.  at 38w5d, admitted for normal labor    Labor management:  - Continue Close Maternal/Fetal Monitoring  - Recheck 2 hours or PRN      Roberta Villegas MD   OB/GYN PGY1  [facility]

## 2022-10-10 NOTE — L&D DELIVERY NOTE
"[facility] - Labor & Delivery  Vaginal Delivery   Operative Note    SUMMARY     Normal spontaneous vaginal delivery of live infant. After evaluaction by NICU due to mecoium stained fluid, infant was placed on mothers abdomen for skin to skin.  Infant delivered position OA over intact perineum.  Nuchal cord: No.    Spontaneous delivery of placenta and IV pitocin given noting good uterine tone.  2nd degree and right labial laceration noted and repaired in normal fashion with 2-0 and 3-0 Vicryl.  Patient tolerated delivery well. Sponge needle and lap counted correctly x2.    Indications:  (normal spontaneous vaginal delivery)  Pregnancy complicated by:   Patient Active Problem List   Diagnosis    Rh negative state in antepartum period    Pregnancy, supervision, high-risk    Advanced maternal age in multigravida    History of prior pregnancy with IUGR     History of gestational hypertension    History of postpartum depression, currently pregnant     (normal spontaneous vaginal delivery)     Admitting GA: 38w5d    Delivery Information for Ishmael Armendariz    Birth information:  YOB: 2021   Time of birth: 9:51 AM   Sex: female   Head Delivery Date/Time: 10/10/2022  9:50 AM   Delivery type: Vaginal, Spontaneous   Gestational Age: 38w5d    Delivery Providers    Delivering clinician: CHAPIS Anaya MD   Provider Role    GEOVANY Lazcano RN Valeria Mantilla, MD Kayla Beshenich, RN               Measurements    Weight: 3147 g  Weight (lbs): 6 lb 15 oz  Length: 49.5 cm  Length (in): 19.5"  Head circumference: 34 cm  Chest circumference: 34 cm         Apgars    Living status: Living  Apgars:  1 min.:  5 min.:  10 min.:  15 min.:  20 min.:    Skin color:  0  1       Heart rate:  2  2       Reflex irritability:  2  2       Muscle tone:  2  2       Respiratory effort:  1  2       Total:  7  9       Apgars assigned by: NICU         Operative Delivery    Forceps " attempted?: No  Vacuum extractor attempted?: No         Shoulder Dystocia    Shoulder dystocia present?: No           Presentation    Presentation: Vertex  Position: Middle Occiput Anterior           Interventions/Resuscitation    Method: NICU Attended       Cord    Vessels: 3 vessels  Complications: None  Delayed Cord Clamping?: Yes  Cord Clamped Date/Time: 10/10/2022  9:52 AM       Placenta    Placenta delivery date/time: 10/10/2022 0957  Placenta removal: Spontaneous  Placenta appearance: Intact  Placenta disposition: discarded           Labor Events:       labor: No     Labor Onset Date/Time:         Dilation Complete Date/Time: 10/10/2022 09:39     Start Pushing Date/Time: 10/10/2022 09:46       Start Pushing Date/Time: 10/10/2022 09:46     Rupture Date/Time:            Rupture type:            Fluid Amount:         Fluid Color:         Fluid Odor:         Membrane Status:                  steroids: None     Antibiotics given for GBS: No     Induction: none     Indications for induction:        Augmentation:       Indications for augmentation:       Labor complications: None     Additional complications:          Cervical ripening:                     Delivery:      Episiotomy: None     Indication for Episiotomy:       Perineal Lacerations: 2nd Repaired:  Yes   Periurethral Laceration:   Repaired:     Labial Laceration: right Repaired: Yes   Sulcus Laceration:   Repaired:     Vaginal Laceration:   Repaired:     Cervical Laceration:   Repaired:     Repair suture:       Repair # of packets: 2     Last Value - EBL - Nursing (mL):       Sum - EBL - Nursing (mL): 0     Last Value - EBL - Anesthesia (mL):        Calculated QBL (mL): 50        Vaginal Sweep Performed: Yes     Surgicount Correct:         Other providers:       Anesthesia    Method: Epidural          Details (if applicable):  Trial of Labor      Categorization:      Priority:     Indications for :      Incision Type:       Additional  information:  Forceps:    Vacuum:    Breech:    Observed anomalies    Other (Comments):           Angelita Winston MD  [facility]   OBGYN PGY1

## 2022-10-10 NOTE — PROGRESS NOTES
LABOR NOTE    Resident to bedside for routine cervical check.    S:  Complaints: No.  Epidural working:  yes      O: /83   Pulse 99   Temp 97.6 °F (36.4 °C) (Oral)   Resp 18   SpO2 100%   Breastfeeding No       FHT: Cat 1 (reassuring), baseline 150, mod kavya, + accels, - decels  CTX: q 1-3 minutes  SVE: /-2    TIMELINE:  0630: /-1  Epidural   0700: /-2    A/P: 36 y.o.  at 38w5d, admitted for normal labor    Labor management:  - Continue Close Maternal/Fetal Monitoring  - Recheck 2 hours or PRN  - Patient requesting to wait to AROM      Roberta Villegas MD   OB/GYN PGY1  [facility]

## 2022-10-10 NOTE — H&P
HISTORY AND PHYSICAL                                                OBSTETRICS          Subjective:       Paula Armendariz is a 36 y.o.  female with IUP at 38w5d weeks gestation who presents in normal labor. Patient feeling contractions q3 since 3:30AM. 5cm dilated on arrival.     Patient reports contractions, denies vaginal bleeding, denies LOF.   Fetal Movement: normal.    This IUP is complicated by Rh neg, AMA, and h/o PPD.    Review of Systems   Constitutional:  Negative for chills and fever.   Respiratory:  Negative for shortness of breath.    Cardiovascular:  Negative for chest pain.   Gastrointestinal:  Positive for abdominal pain (contractions). Negative for nausea and vomiting.   Endocrine: Negative for hot flashes.   Genitourinary:  Negative for vaginal bleeding and vaginal discharge.   Musculoskeletal:  Positive for back pain (contractions).   Integumentary:  Negative for breast mass, nipple discharge and breast skin changes.   Neurological:  Negative for headaches.   Hematological:  Does not bruise/bleed easily.   Psychiatric/Behavioral:  Negative for depression.    Breast: Negative for mass, mastodynia, nipple discharge and skin changes    PMHx:   Past Medical History:   Diagnosis Date    PCOS (polycystic ovarian syndrome)        PSHx:   Past Surgical History:   Procedure Laterality Date    TONSILLECTOMY         All:   Review of patient's allergies indicates:   Allergen Reactions    Adhesive Rash       Meds:   Medications Prior to Admission   Medication Sig Dispense Refill Last Dose    aspirin 81 MG Chew Take 1 tablet (81 mg total) by mouth once daily. 30 tablet 12     diphenhydrAMINE (BENADRYL) 25 mg capsule Take 1 capsule (25 mg total) by mouth every 4 (four) hours as needed (headaches). 30 capsule 1     famotidine (PEPCID) 40 MG tablet Take 1 tablet (40 mg total) by mouth every evening. 30 tablet 2     metoclopramide HCl (REGLAN) 10 MG tablet Take 1 tablet (10 mg total) by mouth every  6 (six) hours as needed (headaches). 30 tablet 1     ondansetron (ZOFRAN-ODT) 4 MG TbDL Take 1 tablet (4 mg total) by mouth every 8 (eight) hours. 60 tablet 3     prenatal 25/iron fum/folic/dha (PRENATAL-1 ORAL) Take by mouth.       sertraline (ZOLOFT) 50 MG tablet Take 1 tablet (50 mg total) by mouth once daily. 30 tablet 12        SH:   Social History     Socioeconomic History    Marital status:    Tobacco Use    Smoking status: Never    Smokeless tobacco: Never   Substance and Sexual Activity    Alcohol use: Not Currently     Comment: socially    Drug use: No    Sexual activity: Yes     Partners: Male       FH:   Family History   Problem Relation Age of Onset    Diabetes Maternal Grandmother     Diabetes Maternal Grandfather     Ovarian cancer Neg Hx     Eclampsia Neg Hx        OBHx:   OB History    Para Term  AB Living   4 2 2 0 1 2   SAB IAB Ectopic Multiple Live Births   0 0 0 0 2      # Outcome Date GA Lbr Jose Alejandro/2nd Weight Sex Delivery Anes PTL Lv   4 Current            3 Term 20 39w0d  2.98 kg (6 lb 9.1 oz) M Vag-Spont EPI N CHRISTOPHER      Name: GINO LUI      Apgar1: 9  Apgar5: 9   2 Term 18 39w0d  2.23 kg (4 lb 14.7 oz) F Vag-Spont  N CHRISTOPHER      Name: MELISA LUI      Apgar1: 8  Apgar5: 9   1 AB                Objective:       /83   SpO2 100%     Vitals:    10/10/22 0606 10/10/22 0609 10/10/22 0611 10/10/22 0616   BP:  120/83     SpO2: 100%  100% 100%       General:   alert, appears stated age and cooperative, no apparent distress   HENT:  normocephalic, atraumatic   Eyes:  extraocular movements and conjunctivae normal   Neck:  supple, range of motion normal, no thyromegaly   Lungs:   no respiratory distress   Heart:   regular rate   Abdomen:  soft, non-tender, non-distended but gravid, no rebound or guarding    Extremities negative edema, negative erythema   FHT: 140, moderate BTBV, +accels, -decels;  Cat 1 (reassuring)                 TOCO: Q 3 minutes    Presentations: cephalic by ultrasound   Cervix:     Dilation: 5cm    Effacement: 60%    Station:  -1    Consistency: soft    Position: anterior   Sterile Speculum Exam: deferred    EFW by Leopold's: 7lb    Recent Growth Scan: 30%    Lab Review  Blood Type O NEG  GBBS: negative  Rubella: Immune  RPR: NR  HIV: negative  HepB: negative       Assessment:       38w5d weeks gestation with normal labor    Active Hospital Problems    Diagnosis  POA    *Normal labor [O80, Z37.9]  Not Applicable      Resolved Hospital Problems   No resolved problems to display.          Plan:   Normal labor   Risks, benefits, alternatives and possible complications have been discussed in detail with the patient.   - Consents signed and to chart  - Admit to Labor and Delivery unit   - Epidural per Anesthesia  - Draw CBC, T&S  - Notify Staff  - Recheck in 2 hrs or PRN  - EFW 30%    2. Rh neg  - Will need postpartum rhogam work up    3. AMA  -Negative genetic screening (neg AFP)    4. H/o PPD  -Mood stable  -Continue home Zoloft     Post-Partum Hemorrhage risk - low    Angelita Winston MD  [facility]   OBGYN PGY1

## 2022-10-10 NOTE — CARE UPDATE
Resident to bedside for cervical check due to patient feeling increased pressure. Patient complete and +1. Infant TIMO.   NST reactive and reassuring.   Patient being set up to push. OB staff notified.  Anticipate .         Angelita Winston MD  [facility]   OBGYN PGY1

## 2022-10-10 NOTE — DISCHARGE INSTRUCTIONS
Discharge Instructions    The AAP recommends exclusive breastfeeding for about the first 6 months of age and as solid foods are introduced, continued breastfeeding  for at least 1 year or longer.    Feed the baby at the earliest sign of hunger or comfort (see signs on the back cover of the Mother's Breastfeeding Guide)  Hands to mouth, sucking motions  Rooting or searching for something to suck on  Dont wait for crying - it is a sign of distress    The feedings may be 8-12 times per 24hrs and will not follow a schedule  Avoid pacifiers and bottles for the first 4 weeks  Alternate the breast you start the feeding with, or start with the breast that feels the fullest  Switch breasts when the baby takes himself off the breast or falls asleep  Keep offering breasts until the baby looks full, no longer gives hunger signs, and stays asleep when placed on his back in the crib  If the baby is sleepy and wont wake for a feeding, put the baby skin-to-skin dressed in a diaper against the mothers bare chest  Sleep with your baby in your room near you  The baby should be positioned and latched on to the breast correctly  Chest-to-chest, chin in the breast  Babys lips are flipped outward  Babys mouth is stretched open wide like a shout  Babys sucking should feel like tugging to the mother  The baby should be drinking at the breast:  You should hear swallowing or gulping throughout the feeding  You should see milk on the babys lips when he comes off the breast  Your breasts should be softer when the baby is finished feeding  The baby should look relaxed at the end of feedings  After the 4th day and your milk is in:  The babys poop should turn bright yellow and be loose, watery, and seedy  The baby should have at least 3-4 poops the size of the palm of your hand per day  The baby should have at least 5-6 wet diapers per day  The urine should be light yellow in color  You should drink when you are thirsty and eat a  healthy diet when you are    hungry.     Take naps to get the rest you need.   Take medications and/or drink alcohol only with permission of your obstetrician    or the babys pediatrician.  You can also call the Infant Risk Center,   ([phone]), Monday-Friday, 8am-5pm Central time, to get the most   up-to-date evidence-based information on the use of medications during   pregnancy and breastfeeding.      Complete the First Alert form in the Mother's Breastfeeding Guide 3-5 days after the baby's birth.  Please call the Breastfeeding Warmline ([phone]) or the baby's pediatrician if you have any concerns.    The baby should be examined by a pediatrician at 3-5 days of age and again at 2 weeks of age.  Once your milk production increases, the baby should be gaining at least ½ - 1oz each day and should be back to birthweight no later than 10-14 days of age.  If this is not the case, please call the Breastfeeding Warmline (066-632-7194) for assistance and support.    Community Resources    [facility] Breastfeeding Warmline: [phone]   Local [facility] clinics: provide incentives and breastpumps to eligible mothers 7-704-909-BABY  La Leche League International (LLLI):  mother-to-mother support group website        www.lll.Optify  [facility] La Leche League mother-to-mother support groups:        www.lllSymbiosis Health.StudyApps        La Leche League [facility]   Dr. Andrew Shea website for latch videos and general information:        www.breastfeedinginc.ca  Infant Risk Center is a call center that provides information about the safety of taking medications while breastfeeding.  Call 8-764-310-6469, M-F, 8am-5pm, [place].  International Lactation Consultant Association provides resources for assistance:        www.ilca.org  [place] Breastfeeding Coalition provides informationand resources for parents  and the community    www.LaBreastfeedingSupport.org  Gricelda Person is a mom-to-mom support group:                              www.nolanesting.com//breastfeedng-support/  [employer] for Healthy Babies:  8-696-437-BABY(5601)  [employer]: a breastfeeding support group for women of color, 107.364.1402

## 2022-10-10 NOTE — ANESTHESIA PREPROCEDURE EVALUATION
10/10/2022  Paula Armendariz is a 36 y.o., female   Pre-operative evaluation for * No surgery found *    Paula Armendariz is a 36 y.o. female presenting in active labor, 5cm dilated and in severe pain. No sig med hx, no problems with prior epidurals, no bleeding/clotting issues, no spine disease.  OB History        4    Para   2    Term   2       0    AB   1    Living   2       SAB   0    IAB   0    Ectopic   0    Multiple   0    Live Births   2                   Patient Active Problem List   Diagnosis    Normal labor    Rh negative state in antepartum period    Pregnancy, supervision, high-risk    Advanced maternal age in multigravida    History of prior pregnancy with IUGR     History of gestational hypertension    History of postpartum depression, currently pregnant       Review of patient's allergies indicates:   Allergen Reactions    Adhesive Rash       No current facility-administered medications on file prior to encounter.     Current Outpatient Medications on File Prior to Encounter   Medication Sig Dispense Refill    aspirin 81 MG Chew Take 1 tablet (81 mg total) by mouth once daily. 30 tablet 12    diphenhydrAMINE (BENADRYL) 25 mg capsule Take 1 capsule (25 mg total) by mouth every 4 (four) hours as needed (headaches). 30 capsule 1    famotidine (PEPCID) 40 MG tablet Take 1 tablet (40 mg total) by mouth every evening. 30 tablet 2    metoclopramide HCl (REGLAN) 10 MG tablet Take 1 tablet (10 mg total) by mouth every 6 (six) hours as needed (headaches). 30 tablet 1    ondansetron (ZOFRAN-ODT) 4 MG TbDL Take 1 tablet (4 mg total) by mouth every 8 (eight) hours. 60 tablet 3    prenatal 25/iron fum/folic/dha (PRENATAL-1 ORAL) Take by mouth.      sertraline (ZOLOFT) 50 MG tablet Take 1 tablet (50 mg total) by mouth once daily. 30 tablet 12       Past Surgical  History:   Procedure Laterality Date    TONSILLECTOMY         CBC:   Recent Labs     10/10/22  0620   WBC 10.48   RBC 3.83*   HGB 10.8*   HCT 33.3*      MCV 87   MCH 28.2   MCHC 32.4       Pre-op Assessment    I have reviewed the Patient Summary Reports.     I have reviewed the Nursing Notes. I have reviewed the NPO Status.   I have reviewed the Medications.     Review of Systems  Anesthesia Hx:  No problems with previous Anesthesia Denies Hx of Anesthetic complications  History of prior surgery of interest to airway management or planning: Denies Family Hx of Anesthesia complications.   Denies Personal Hx of Anesthesia complications.   Social:  No Alcohol Use, Non-Smoker    Hematology/Oncology:  Hematology Normal   Oncology Normal     EENT/Dental:EENT/Dental Normal   Cardiovascular:  Cardiovascular Normal Exercise tolerance: good     Pulmonary:  Pulmonary Normal    Renal/:  Renal/ Normal     Hepatic/GI:  Hepatic/GI Normal    Neurological:  Neurology Normal    Endocrine:  Endocrine Normal    Psych:  Psychiatric Normal           Physical Exam  General: Well nourished, Cooperative, Alert and Oriented    Airway:  Mallampati: II   Mouth Opening: Normal  TM Distance: Normal  Tongue: Normal  Neck ROM: Normal ROM    Dental:  Intact    Chest/Lungs:  Clear to auscultation, Normal Respiratory Rate    Heart:  Rate: Normal  Rhythm: Regular Rhythm        Anesthesia Plan  Type of Anesthesia, risks & benefits discussed:    Anesthesia Type: Epidural  Intra-op Monitoring Plan: Standard ASA Monitors  Post Op Pain Control Plan: epidural analgesia  Informed Consent: Informed consent signed with the Patient and all parties understand the risks and agree with anesthesia plan.  All questions answered. Patient consented to blood products? Yes  ASA Score: 2 Emergent  Day of Surgery Review of History & Physical: H&P Update referred to the surgeon/provider.    Ready For Surgery From Anesthesia Perspective.     .

## 2022-10-10 NOTE — ANESTHESIA PROCEDURE NOTES
CSE    Patient location during procedure: OB  Start time: 10/10/2022 6:30 AM  Timeout: 10/10/2022 6:28 AM  End time: 10/10/2022 6:36 AM    Reason for block: labor analgesia requested by patient and obstetrician    Staffing  Authorizing Provider: Ramiro Haskins MD  Performing Provider: Ramiro Haskins MD    Staffing  Other anesthesia staff: Keyur Knowles MD  Preanesthetic Checklist  Completed: patient identified, IV checked, site marked, risks and benefits discussed, surgical consent, monitors and equipment checked, pre-op evaluation and timeout performed  CSE  Patient position: sitting  Prep: ChloraPrep  Patient monitoring: heart rate, continuous pulse ox and frequent blood pressure checks  Approach: midline  Spinal Needle  Needle type: pencil-tip   Needle gauge: 25 G  Needle length: 5 in  Epidural Needle  Injection technique: BROENNA saline  Needle type: Tuohy   Needle gauge: 17 G  Needle length: 3.5 in  Needle insertion depth: 6 cm  Location: L3-4  Needle localization: anatomical landmarks   Catheter  Catheter type: springwWitsbits  Catheter size: 19 G  Catheter at skin depth: 10 cm  Test dose: lidocaine 1.5% with Epi 1-to-200,000  Test dose: 3 mL  Additional Documentation: incremental injection, no paresthesia on injection, negative test dose and negative aspiration for heme  Additional Notes  Patient's placement was easy and straightforward, but the catheter would not inject even after replacing the alligator clamp. We had to place a new catheter. Upon inspection, it was kinked at the end which caused the occlusion. We explained this rarity to the patient and replaced it without incident.

## 2022-10-10 NOTE — LACTATION NOTE
Lactation visited the pt. Pt was attempting to breastfeed the baby on the left breast. Left nipple is red and tender. Baby was on and off with hiccups. Baby changed to the right breast in cross cradle. Pt latched the baby deeply to the breast with good sucks noted. Few swallows noted. Breast compression used thru the feeding to keep her actively feeding. Baby self detached and pt latched the baby on the left side but baby was unable to latch and coordinate her suck pattern. Pt put baby skin to skin. Pt encouraged to feed the baby 8 or more times in 24hrs on cue until content. Breastfeeding basics reviewed via breastfeeding guide. Pt verbalized understanding.   10/10/22 1700   Maternal Assessment   Breast Shape Bilateral:;round   Breast Density Bilateral:;soft   Areola Bilateral:;elastic   Nipples Bilateral:;everted   Left Nipple Symptoms redness   Maternal Infant Feeding   Maternal Emotional State assist needed;independent   Infant Positioning cross-cradle   Signs of Milk Transfer infant jaw motion present   Pain with Feeding no   Nipple Shape After Feeding, Left crease   Nipple Shape After Feeding, Right round   Latch Assistance yes   Community Referrals   Community Referrals outpatient lactation program;support group

## 2022-10-10 NOTE — PLAN OF CARE
VSS. Pain controlled with scheduled oral pain medication. Breastfeeding. Fundus firm and midline with moderate lochia rubra. PP pitocin stopped at 1458. Ambulating independently. Voiding spontaneously with adequate output. Passing gas. No concerns at this time.

## 2022-10-11 ENCOUNTER — TELEPHONE (OUTPATIENT)
Dept: OBSTETRICS AND GYNECOLOGY | Facility: CLINIC | Age: 37
End: 2022-10-11
Payer: COMMERCIAL

## 2022-10-11 VITALS
HEART RATE: 90 BPM | OXYGEN SATURATION: 98 % | DIASTOLIC BLOOD PRESSURE: 62 MMHG | SYSTOLIC BLOOD PRESSURE: 116 MMHG | RESPIRATION RATE: 18 BRPM | TEMPERATURE: 98 F

## 2022-10-11 LAB
BASOPHILS # BLD AUTO: 0.04 K/UL (ref 0–0.2)
BASOPHILS NFR BLD: 0.4 % (ref 0–1.9)
DIFFERENTIAL METHOD: ABNORMAL
EOSINOPHIL # BLD AUTO: 0.2 K/UL (ref 0–0.5)
EOSINOPHIL NFR BLD: 1.6 % (ref 0–8)
ERYTHROCYTE [DISTWIDTH] IN BLOOD BY AUTOMATED COUNT: 14.2 % (ref 11.5–14.5)
HCT VFR BLD AUTO: 31.7 % (ref 37–48.5)
HGB BLD-MCNC: 9.9 G/DL (ref 12–16)
IMM GRANULOCYTES # BLD AUTO: 0.08 K/UL (ref 0–0.04)
IMM GRANULOCYTES NFR BLD AUTO: 0.9 % (ref 0–0.5)
LYMPHOCYTES # BLD AUTO: 1.5 K/UL (ref 1–4.8)
LYMPHOCYTES NFR BLD: 16.1 % (ref 18–48)
MCH RBC QN AUTO: 28.1 PG (ref 27–31)
MCHC RBC AUTO-ENTMCNC: 31.2 G/DL (ref 32–36)
MCV RBC AUTO: 90 FL (ref 82–98)
MONOCYTES # BLD AUTO: 0.5 K/UL (ref 0.3–1)
MONOCYTES NFR BLD: 5.7 % (ref 4–15)
NEUTROPHILS # BLD AUTO: 7 K/UL (ref 1.8–7.7)
NEUTROPHILS NFR BLD: 75.3 % (ref 38–73)
NRBC BLD-RTO: 0 /100 WBC
PLATELET # BLD AUTO: 239 K/UL (ref 150–450)
PMV BLD AUTO: 11.6 FL (ref 9.2–12.9)
RBC # BLD AUTO: 3.52 M/UL (ref 4–5.4)
WBC # BLD AUTO: 9.32 K/UL (ref 3.9–12.7)

## 2022-10-11 PROCEDURE — 25000003 PHARM REV CODE 250

## 2022-10-11 PROCEDURE — 85025 COMPLETE CBC W/AUTO DIFF WBC: CPT | Performed by: OBSTETRICS & GYNECOLOGY

## 2022-10-11 PROCEDURE — 25000003 PHARM REV CODE 250: Performed by: STUDENT IN AN ORGANIZED HEALTH CARE EDUCATION/TRAINING PROGRAM

## 2022-10-11 PROCEDURE — 36415 COLL VENOUS BLD VENIPUNCTURE: CPT | Performed by: OBSTETRICS & GYNECOLOGY

## 2022-10-11 RX ORDER — ACETAMINOPHEN 325 MG/1
650 TABLET ORAL EVERY 6 HOURS PRN
Qty: 30 TABLET | Refills: 1 | Status: SHIPPED | OUTPATIENT
Start: 2022-10-11 | End: 2023-04-03 | Stop reason: ALTCHOICE

## 2022-10-11 RX ORDER — DOCUSATE SODIUM 100 MG/1
200 CAPSULE, LIQUID FILLED ORAL 2 TIMES DAILY PRN
Qty: 60 CAPSULE | Refills: 0 | Status: SHIPPED | OUTPATIENT
Start: 2022-10-11 | End: 2023-04-03 | Stop reason: ALTCHOICE

## 2022-10-11 RX ORDER — IBUPROFEN 600 MG/1
600 TABLET ORAL EVERY 6 HOURS PRN
Qty: 30 TABLET | Refills: 1 | Status: SHIPPED | OUTPATIENT
Start: 2022-10-11 | End: 2023-04-03 | Stop reason: ALTCHOICE

## 2022-10-11 RX ADMIN — SERTRALINE HYDROCHLORIDE 50 MG: 50 TABLET ORAL at 08:10

## 2022-10-11 RX ADMIN — IBUPROFEN 600 MG: 600 TABLET ORAL at 05:10

## 2022-10-11 RX ADMIN — IBUPROFEN 600 MG: 600 TABLET ORAL at 11:10

## 2022-10-11 RX ADMIN — IBUPROFEN 600 MG: 600 TABLET ORAL at 12:10

## 2022-10-11 RX ADMIN — PRENATAL VIT W/ FE FUMARATE-FA TAB 27-0.8 MG 1 TABLET: 27-0.8 TAB at 08:10

## 2022-10-11 NOTE — MEDICAL/APP STUDENT
"POSTPARTUM PROGRESS NOTE    Subjective:     PPD/POD#: 1   Procedure:    EGA: 38w5d   N/V: No   F/C: No   Abd Pain: Mild, well-controlled with oral pain medication   Lochia: Mild   Voiding: Yes   Ambulating: Yes   Bowel fnc: Yes   Breastfeeding: Yes   Contraception: Not asked   Circumcision: N/A, female     Paula Armendariz is a 36 yr old F status post  post partum day 1. The patient appears well, comfortable in bed. She complains of mild (2-3/10) pain that is "worst on top of her uterus." She has been ambulating without difficulty, has urinated and had a bowel movement with no difficulties. She has been eating and drinking well and her pain is well controlled. She is not receiving any IV pain medications and feels her pain is well controlled at this time. She said she had some episodes of sweating last night but believes this is hormonal rather than due to a fever. She denied headache, vision changes, shortness of breath, and abdominal pain.      Objective:      Temp:  [97.6 °F (36.4 °C)-98.7 °F (37.1 °C)] 98 °F (36.7 °C)  Pulse:  [79-99] 87  Resp:  [16-20] 20  SpO2:  [97 %-100 %] 98 %  BP: (114-138)/(63-88) 122/76    Lung: Normal respiratory effort   Abdomen: Soft, appropriately tender   Uterus: Firm, no fundal tenderness   Incision: N/A   : Not examined   Extremities: Bilateral trace edema     Lab Review    No results for input(s): NA, K, CL, CO2, BUN, CREATININE, GLU, PROT, BILITOT, ALKPHOS, ALT, AST, MG, PHOS in the last 168 hours.    Recent Labs   Lab 10/10/22  0620   WBC 10.48   HGB 10.8*   HCT 33.3*   MCV 87            I/O    Intake/Output Summary (Last 24 hours) at 10/11/2022 0611  Last data filed at 10/10/2022 1344  Gross per 24 hour   Intake --   Output 850 ml   Net -850 ml        Assessment and Plan:   Postpartum care:  - Patient doing well.  - Continue routine management and advances.    History of postpartum depression:  -continue to check on patients mood, discuss importance of follow up  "   -receiving 50 mg of sertraline daily (since 10/10 at 9:00am    Rh negative:  Baby is o negative   Rhogam not needed    Anemia:  - H/H as above  - QBL: 150  - asymptomatic  - iron/colace    Mary Jo Lynch   MS 3     A student assisted me with documenting this service.  I saw the patient and performed the history, physical exam, and medical decision making.  I reviewed and verified all information documented by the student and made modifications to such documentation when appropriate.  -- CHAPIS Anaya M.D.    - Doing well.  OK for discharge at 24-hrs post-partum if patient desires.

## 2022-10-11 NOTE — PLAN OF CARE
VSS. Uterus firm w/o massage, bleeding continues to improve. Pt ambulating independently, voiding spontaneously. Pain managed adequately w/medication. Plan of care reviewed w/pt and spouse. No new concerns expressed at this time. Will continue to monitor appropriately.

## 2022-10-11 NOTE — LACTATION NOTE
Breastfeeding discharge education reviewed via breastfeeding guide. Breastfeeding resources given for support after discharge. Pt said her nipples are tender. Pt has an allergy to lanolin . Encouraged the use of her expressed breastmilk applied to her nipples for healing and the use of hydrogels. Pt educated on the use of the hydrogels.

## 2022-10-11 NOTE — TELEPHONE ENCOUNTER
----- Message from Cecile Rogers RN sent at 10/11/2022 12:25 PM CDT -----  Hi, can you please call ms manning to set up a post partum mood check appt. Thank you

## 2022-10-11 NOTE — ANESTHESIA POSTPROCEDURE EVALUATION
Anesthesia Post Evaluation    Patient: Paula Armendariz    Procedure(s) Performed: * No procedures listed *    Final Anesthesia Type: CSE      Patient location during evaluation: labor & delivery  Patient participation: Yes- Able to Participate  Level of consciousness: awake and alert, awake and oriented  Post-procedure vital signs: reviewed and stable  Pain management: adequate  Airway patency: patent  LAVINIA mitigation strategies: Multimodal analgesia and Use of major conduction anesthesia (spinal/epidural) or peripheral nerve block  PONV status at discharge: No PONV  Anesthetic complications: no      Cardiovascular status: blood pressure returned to baseline and hemodynamically stable  Respiratory status: unassisted, spontaneous ventilation and room air  Hydration status: euvolemic  Follow-up not needed.          Vitals Value Taken Time   /76 10/10/22 2308   Temp 36.7 °C (98 °F) 10/10/22 2308   Pulse 87 10/10/22 2308   Resp 20 10/10/22 2308   SpO2 98 % 10/10/22 2308         No case tracking events are documented in the log.      Pain/Jeb Score: Pain Rating Prior to Med Admin: 2 (10/11/2022  5:48 AM)  Pain Rating Post Med Admin: 2 (10/11/2022  6:30 AM)

## 2022-10-11 NOTE — DISCHARGE SUMMARY
Delivery Discharge Summary  Obstetrics      Primary OB Clinician: Lupe Reed MD     Admission date: 10/10/2022  Discharge date: 10/11/2022    Disposition: To home, self care    Discharge Diagnosis List:  Patient Active Problem List   Diagnosis    Rh negative state in antepartum period    Pregnancy, supervision, high-risk    Advanced maternal age in multigravida    History of prior pregnancy with IUGR     History of gestational hypertension    History of postpartum depression, currently pregnant     (normal spontaneous vaginal delivery)       Procedure:      Hospital Course:  Paula Armendariz is a 36 y.o. now , PPD #1 who was admitted on 10/10/2022 at 38w5d for Normal labor [O80, Z37.9]. Patient was subsequently admitted to labor and delivery unit with signed consents.     Labor course was uncomplicated and resulted in  without complications.     Please see delivery note for further details. Her postpartum course was uncomplicated. On discharge day, patient's pain is controlled with oral pain medications. Pt is tolerating ambulation without SOB or CP, and regular diet without N/V. Reports lochia is mild. Denies any HA, vision changes, F/C, LE swelling. Denies any breast pain/soreness.    Pt in stable condition and ready for discharge. She has been instructed to start and/or continue medications and follow up with her obstetrics provider as listed below.    Pertinent studies:  CBC  Recent Labs   Lab 10/10/22  0620 10/11/22  0929   WBC 10.48 9.32   HGB 10.8* 9.9*   HCT 33.3* 31.7*   MCV 87 90    239          Immunization History   Administered Date(s) Administered    COVID-19, vector-nr, rS-Ad26, PF ([place]) 03/10/2021    Influenza - Quadrivalent - PF *Preferred* (6 months and older) 2020, 2022    Rho (D) Immune Globulin 2017, 2020, 08/10/2022    Tdap 2017, 2020, 08/10/2022        Delivery:    Episiotomy: None   Lacerations: 2nd   Repair  suture:     Repair # of packets: 2   Blood loss (ml):       Birth information:  YOB: 2021   Time of birth: 9:51 AM   Sex: female   Delivery type: Vaginal, Spontaneous   Gestational Age: 38w5d    Delivery Clinician:      Other providers:       Additional  information:  Forceps:    Vacuum:    Breech:    Observed anomalies      Living?:           APGARS  One minute Five minutes Ten minutes   Skin color:         Heart rate:         Grimace:         Muscle tone:         Breathing:         Totals: 7  9        Placenta: Delivered:       appearance    Patient Instructions:   Current Discharge Medication List        START taking these medications    Details   acetaminophen (TYLENOL) 325 MG tablet Take 2 tablets (650 mg total) by mouth every 6 (six) hours as needed for Pain.  Qty: 30 tablet, Refills: 1      docusate sodium (COLACE) 100 MG capsule Take 2 capsules (200 mg total) by mouth 2 (two) times daily as needed for Constipation.  Qty: 60 capsule, Refills: 0      ibuprofen (ADVIL,MOTRIN) 600 MG tablet Take 1 tablet (600 mg total) by mouth every 6 (six) hours as needed (cramping).  Qty: 30 tablet, Refills: 1           CONTINUE these medications which have NOT CHANGED    Details   famotidine (PEPCID) 40 MG tablet Take 1 tablet (40 mg total) by mouth every evening.  Qty: 30 tablet, Refills: 2    Associated Diagnoses: Heartburn during pregnancy, antepartum      prenatal 25/iron fum/folic/dha (PRENATAL-1 ORAL) Take by mouth.      sertraline (ZOLOFT) 50 MG tablet Take 1 tablet (50 mg total) by mouth once daily.  Qty: 30 tablet, Refills: 12           STOP taking these medications       aspirin 81 MG Chew Comments:   Reason for Stopping:         diphenhydrAMINE (BENADRYL) 25 mg capsule Comments:   Reason for Stopping:         metoclopramide HCl (REGLAN) 10 MG tablet Comments:   Reason for Stopping:         ondansetron (ZOFRAN-ODT) 4 MG TbDL Comments:   Reason for Stopping:               Discharge Procedure Orders    Diet Adult Regular     Lifting restrictions   Order Comments: No lifting more than infant weight for 6 weeks.     No driving until:   Order Comments: Not taking narcotic medicine and feel comfortable stepping on the brakes.     Pelvic Rest   Order Comments: Nothing in vagina including intercourse for 6 weeks.     Notify your health care provider if you experience any of the following:  temperature >100.4     Notify your health care provider if you experience any of the following:  persistent nausea and vomiting or diarrhea     Notify your health care provider if you experience any of the following:  severe uncontrolled pain     Notify your health care provider if you experience any of the following:  redness, tenderness, or signs of infection (pain, swelling, redness, odor or green/yellow discharge around incision site)     Notify your health care provider if you experience any of the following:  severe persistent headache     Notify your health care provider if you experience any of the following:  persistent dizziness, light-headedness, or visual disturbances     Notify your health care provider if you experience any of the following:  increased confusion or weakness     Notify your health care provider if you experience any of the following:   Order Comments: Heavy vaginal bleeding, saturating more than 2 pads in 1 hour.     Activity as tolerated        Follow-up Information       Lupe Reed MD Follow up in 2 week(s).    Specialties: Obstetrics, Obstetrics and Gynecology  Why: Post partum visit  Contact information:  [address] [id number] [phone]                              Angelita Winston MD  [facility]   OBGYN PGY1

## 2022-10-11 NOTE — TELEPHONE ENCOUNTER
Lonny Mitchell, congratulations! Hope you and baby are doing well. I have scheduled 2 postpartum appointments for you. Please view on the portal at your convenience and let me know if you have any questions.    Thanks!

## 2022-10-11 NOTE — PLAN OF CARE
VSS, No issues during shift.  Discharge education given to patient. Patient verbalized understanding.   Mood check in 2 weeks. Follow up with OB in 6 weeks. Awaiting transport for discharge. Will continue to monitor. Shante Connor RN

## 2022-10-12 ENCOUNTER — PATIENT MESSAGE (OUTPATIENT)
Dept: OBSTETRICS AND GYNECOLOGY | Facility: CLINIC | Age: 37
End: 2022-10-12
Payer: COMMERCIAL

## 2022-10-12 ENCOUNTER — PATIENT MESSAGE (OUTPATIENT)
Dept: OBSTETRICS AND GYNECOLOGY | Facility: OTHER | Age: 37
End: 2022-10-12
Payer: COMMERCIAL

## 2022-10-21 ENCOUNTER — TELEPHONE (OUTPATIENT)
Dept: OBSTETRICS AND GYNECOLOGY | Facility: CLINIC | Age: 37
End: 2022-10-21
Payer: COMMERCIAL

## 2022-10-21 ENCOUNTER — PATIENT MESSAGE (OUTPATIENT)
Dept: OBSTETRICS AND GYNECOLOGY | Facility: CLINIC | Age: 37
End: 2022-10-21
Payer: COMMERCIAL

## 2022-10-21 NOTE — TELEPHONE ENCOUNTER
Pt was scheduled for a mood check this morning. Due to family circumstances was unable to make appt. Spoke to pt per phone- she reports she is feeling well, continues on Zoloft. Reports breastfeeding and baby doing well- good sleeper. Advised her to call to rescheduled mood check if she has any concerns going forward. Has routine PP appt scheduled.

## 2022-10-31 ENCOUNTER — PATIENT MESSAGE (OUTPATIENT)
Dept: OBSTETRICS AND GYNECOLOGY | Facility: CLINIC | Age: 37
End: 2022-10-31
Payer: COMMERCIAL

## 2022-11-17 ENCOUNTER — OFFICE VISIT (OUTPATIENT)
Dept: INTERNAL MEDICINE | Facility: CLINIC | Age: 37
End: 2022-11-17
Payer: COMMERCIAL

## 2022-11-17 DIAGNOSIS — J32.9 SINUSITIS, UNSPECIFIED CHRONICITY, UNSPECIFIED LOCATION: Primary | ICD-10-CM

## 2022-11-17 DIAGNOSIS — Z00.00 ANNUAL PHYSICAL EXAM: ICD-10-CM

## 2022-11-17 PROCEDURE — 99214 PR OFFICE/OUTPT VISIT, EST, LEVL IV, 30-39 MIN: ICD-10-PCS | Mod: 95,,, | Performed by: INTERNAL MEDICINE

## 2022-11-17 PROCEDURE — 99214 OFFICE O/P EST MOD 30 MIN: CPT | Mod: 95,,, | Performed by: INTERNAL MEDICINE

## 2022-11-17 RX ORDER — METHYLPREDNISOLONE 4 MG/1
TABLET ORAL
Qty: 21 EACH | Refills: 0 | Status: SHIPPED | OUTPATIENT
Start: 2022-11-17 | End: 2022-12-08

## 2022-11-17 RX ORDER — AMOXICILLIN AND CLAVULANATE POTASSIUM 875; 125 MG/1; MG/1
1 TABLET, FILM COATED ORAL EVERY 12 HOURS
Qty: 20 TABLET | Refills: 0 | Status: SHIPPED | OUTPATIENT
Start: 2022-11-17 | End: 2023-04-03 | Stop reason: ALTCHOICE

## 2022-11-17 NOTE — PROGRESS NOTES
[facility] Virtual Visit Note    The patient location is: [place]  The chief complaint leading to consultation is: sinusitis  Visit type: Virtual visit with synchronous audio and video  Total time spent with patient: 20 min  Each patient to whom he or she provides medical services by telemedicine is:  (1) informed of the relationship between the physician and patient and the respective role of any other health care provider with respect to management of the patient; and (2) notified that he or she may decline to receive medical services by telemedicine and may withdraw from such care at any time.      Chief Complaint    No chief complaint on file.      History of Present Illness      Paula Armendariz is a 36 y.o. female with chronic conditions of PCOS who presents today for: sinus pressure.  Started with viral URTI 2 weeks ago, sick contacts at home with kids.  Denies fevers, chills. Currently breastfeeding and limiting OTC.  Has tried intermittent nasal decongestant but does not want to affect milk supply.  Has successfully taken antibiotics and steroids previously while breastfeeding and tolerated well.    Pt without primary care physician.     Past Medical History:  Past Medical History:   Diagnosis Date    PCOS (polycystic ovarian syndrome)        Past Surgical History:   has a past surgical history that includes Tonsillectomy.    Family History:  family history includes Diabetes in her maternal grandfather and maternal grandmother.     Social History:  Social History     Tobacco Use    Smoking status: Never    Smokeless tobacco: Never   Substance Use Topics    Alcohol use: Not Currently     Comment: socially    Drug use: No       Review of Systems   Constitutional:  Negative for chills, fever and malaise/fatigue.   HENT:  Negative for hearing loss.    Eyes:  Positive for discharge.   Respiratory:  Negative for shortness of breath and wheezing.    Cardiovascular:  Negative for chest pain and  palpitations.   Gastrointestinal:  Negative for blood in stool, constipation, diarrhea, nausea and vomiting.   Genitourinary:  Negative for dysuria and hematuria.   Musculoskeletal:  Negative for neck pain.   Skin:  Negative for rash.   Neurological:  Positive for headaches. Negative for weakness.   Endo/Heme/Allergies:  Negative for polydipsia.   All other systems reviewed and are negative.     Medications:  Outpatient Encounter Medications as of 11/17/2022   Medication Sig Dispense Refill    acetaminophen (TYLENOL) 325 MG tablet Take 2 tablets (650 mg total) by mouth every 6 (six) hours as needed for Pain. 30 tablet 1    amoxicillin-clavulanate 875-125mg (AUGMENTIN) 875-125 mg per tablet Take 1 tablet by mouth every 12 (twelve) hours. 20 tablet 0    docusate sodium (COLACE) 100 MG capsule Take 2 capsules (200 mg total) by mouth 2 (two) times daily as needed for Constipation. 60 capsule 0    famotidine (PEPCID) 40 MG tablet Take 1 tablet (40 mg total) by mouth every evening. 30 tablet 2    ibuprofen (ADVIL,MOTRIN) 600 MG tablet Take 1 tablet (600 mg total) by mouth every 6 (six) hours as needed (cramping). 30 tablet 1    methylPREDNISolone (MEDROL DOSEPACK) 4 mg tablet use as directed 21 each 0    prenatal 25/iron fum/folic/dha (PRENATAL-1 ORAL) Take by mouth.      sertraline (ZOLOFT) 50 MG tablet Take 1 tablet (50 mg total) by mouth once daily. 30 tablet 12     No facility-administered encounter medications on file as of 11/17/2022.       Allergies:  Review of patient's allergies indicates:   Allergen Reactions    Adhesive Rash       Health Maintenance:  Immunization History   Administered Date(s) Administered    COVID-19, vector-nr, rS-Ad26, PF ([place]) 03/10/2021    Influenza - Quadrivalent - PF *Preferred* (6 months and older) 09/17/2020, 09/28/2022    Rho (D) Immune Globulin 11/06/2017, 09/17/2020, 08/10/2022    Tdap 12/22/2017, 09/17/2020, 08/10/2022      Health Maintenance   Topic Date Due    TETANUS  VACCINE  08/10/2032    Hepatitis C Screening  Completed    Lipid Panel  Completed        Physical Exam       ]    Physical Exam  Constitutional:       General: She is not in acute distress.     Appearance: She is well-developed. She is not diaphoretic.   Eyes:      General: No scleral icterus.        Right eye: No discharge.         Left eye: No discharge.      Conjunctiva/sclera: Conjunctivae normal.   Pulmonary:      Effort: Pulmonary effort is normal. No respiratory distress.   Neurological:      Mental Status: She is alert and oriented to person, place, and time.   Psychiatric:         Mood and Affect: Mood normal.         Behavior: Behavior normal.         Thought Content: Thought content normal.         Judgment: Judgment normal.        Laboratory:  CBC:  Recent Labs   Lab 09/22/22  1047 10/10/22  0620 10/11/22  0929   WBC 9.72 10.48 9.32   RBC 3.57 L 3.83 L 3.52 L   Hemoglobin 10.3 L 10.8 L 9.9 L   Hematocrit 32.4 L 33.3 L 31.7 L   Platelets 274 280 239   MCV 91 87 90   MCH 28.9 28.2 28.1   MCHC 31.8 L 32.4 31.2 L     CMP:  Recent Labs   Lab 05/13/20  1111 02/15/22  1043   Glucose 92 102   Calcium 9.2 9.3   Albumin 4.0 4.3   Total Protein 7.2 7.6   Sodium 136 136   Potassium 4.0 3.7   CO2 23 21 L   Chloride 103 105   BUN 6 8   Alkaline Phosphatase 56 68   ALT 16 16   AST 17 17   Total Bilirubin 0.5 0.5     URINALYSIS:       LIPIDS:      TSH:      A1C:  Recent Labs   Lab 05/13/20  1111   Hemoglobin A1C 5.2       Assessment/Plan     Paula Armendariz is a 36 y.o.female with:    1. Sinusitis, unspecified chronicity, unspecified location  - amoxicillin-clavulanate 875-125mg (AUGMENTIN) 875-125 mg per tablet; Take 1 tablet by mouth every 12 (twelve) hours.  Dispense: 20 tablet; Refill: 0  - methylPREDNISolone (MEDROL DOSEPACK) 4 mg tablet; use as directed  Dispense: 21 each; Refill: 0   Start on augmentin and medrol.  Recommend nasacort OTC 2 sprays each nostril daily.  Recommend to establish with new PCP.       Chronic conditions status updated as per HPI.  Other than changes above, cont current medications and maintain follow up with specialists.  No follow-ups on file.    Future Appointments   Date Time Provider Department Center   11/30/2022  9:00 AM Lupe Reed MD [facility]       Matthew Chaudhary MD  [facility]                Answers submitted by the patient for this visit:  Review of Systems Questionnaire (Submitted on 11/17/2022)  activity change: No  unexpected weight change: No  rhinorrhea: Yes  trouble swallowing: Yes  visual disturbance: No  chest tightness: No  polyuria: No  difficulty urinating: No  menstrual problem: No  joint swelling: No  arthralgias: No  confusion: No  dysphoric mood: No

## 2022-11-30 ENCOUNTER — TELEPHONE (OUTPATIENT)
Dept: OBSTETRICS AND GYNECOLOGY | Facility: CLINIC | Age: 37
End: 2022-11-30
Payer: COMMERCIAL

## 2022-12-27 ENCOUNTER — TELEPHONE (OUTPATIENT)
Dept: OTOLARYNGOLOGY | Facility: CLINIC | Age: 37
End: 2022-12-27
Payer: COMMERCIAL

## 2023-04-03 ENCOUNTER — OFFICE VISIT (OUTPATIENT)
Dept: ALLERGY | Facility: CLINIC | Age: 38
End: 2023-04-03
Payer: COMMERCIAL

## 2023-04-03 ENCOUNTER — LAB VISIT (OUTPATIENT)
Dept: LAB | Facility: HOSPITAL | Age: 38
End: 2023-04-03
Payer: COMMERCIAL

## 2023-04-03 VITALS
DIASTOLIC BLOOD PRESSURE: 66 MMHG | OXYGEN SATURATION: 98 % | WEIGHT: 197.75 LBS | SYSTOLIC BLOOD PRESSURE: 108 MMHG | BODY MASS INDEX: 29.2 KG/M2 | HEART RATE: 86 BPM

## 2023-04-03 DIAGNOSIS — J31.0 CHRONIC RHINITIS: Primary | ICD-10-CM

## 2023-04-03 DIAGNOSIS — J31.0 CHRONIC RHINITIS: ICD-10-CM

## 2023-04-03 DIAGNOSIS — J32.9 RECURRENT SINUSITIS: ICD-10-CM

## 2023-04-03 LAB
IGA SERPL-MCNC: 124 MG/DL (ref 40–350)
IGE SERPL-ACNC: <35 IU/ML (ref 0–100)
IGG SERPL-MCNC: 1128 MG/DL (ref 650–1600)
IGM SERPL-MCNC: 130 MG/DL (ref 50–300)

## 2023-04-03 PROCEDURE — 1159F PR MEDICATION LIST DOCUMENTED IN MEDICAL RECORD: ICD-10-PCS | Mod: CPTII,S$GLB,, | Performed by: ALLERGY & IMMUNOLOGY

## 2023-04-03 PROCEDURE — 1159F MED LIST DOCD IN RCRD: CPT | Mod: CPTII,S$GLB,, | Performed by: ALLERGY & IMMUNOLOGY

## 2023-04-03 PROCEDURE — 99999 PR PBB SHADOW E&M-EST. PATIENT-LVL III: CPT | Mod: PBBFAC,,, | Performed by: ALLERGY & IMMUNOLOGY

## 2023-04-03 PROCEDURE — 3074F SYST BP LT 130 MM HG: CPT | Mod: CPTII,S$GLB,, | Performed by: ALLERGY & IMMUNOLOGY

## 2023-04-03 PROCEDURE — 3008F BODY MASS INDEX DOCD: CPT | Mod: CPTII,S$GLB,, | Performed by: ALLERGY & IMMUNOLOGY

## 2023-04-03 PROCEDURE — 99999 PR PBB SHADOW E&M-EST. PATIENT-LVL III: ICD-10-PCS | Mod: PBBFAC,,, | Performed by: ALLERGY & IMMUNOLOGY

## 2023-04-03 PROCEDURE — 86317 IMMUNOASSAY INFECTIOUS AGENT: CPT | Mod: 59 | Performed by: ALLERGY & IMMUNOLOGY

## 2023-04-03 PROCEDURE — 86003 ALLG SPEC IGE CRUDE XTRC EA: CPT | Mod: 59 | Performed by: ALLERGY & IMMUNOLOGY

## 2023-04-03 PROCEDURE — 86003 ALLG SPEC IGE CRUDE XTRC EA: CPT | Performed by: ALLERGY & IMMUNOLOGY

## 2023-04-03 PROCEDURE — 99204 OFFICE O/P NEW MOD 45 MIN: CPT | Mod: S$GLB,,, | Performed by: ALLERGY & IMMUNOLOGY

## 2023-04-03 PROCEDURE — 3008F PR BODY MASS INDEX (BMI) DOCUMENTED: ICD-10-PCS | Mod: CPTII,S$GLB,, | Performed by: ALLERGY & IMMUNOLOGY

## 2023-04-03 PROCEDURE — 82784 ASSAY IGA/IGD/IGG/IGM EACH: CPT | Performed by: ALLERGY & IMMUNOLOGY

## 2023-04-03 PROCEDURE — 36415 COLL VENOUS BLD VENIPUNCTURE: CPT | Performed by: ALLERGY & IMMUNOLOGY

## 2023-04-03 PROCEDURE — 3074F PR MOST RECENT SYSTOLIC BLOOD PRESSURE < 130 MM HG: ICD-10-PCS | Mod: CPTII,S$GLB,, | Performed by: ALLERGY & IMMUNOLOGY

## 2023-04-03 PROCEDURE — 3078F DIAST BP <80 MM HG: CPT | Mod: CPTII,S$GLB,, | Performed by: ALLERGY & IMMUNOLOGY

## 2023-04-03 PROCEDURE — 3078F PR MOST RECENT DIASTOLIC BLOOD PRESSURE < 80 MM HG: ICD-10-PCS | Mod: CPTII,S$GLB,, | Performed by: ALLERGY & IMMUNOLOGY

## 2023-04-03 PROCEDURE — 82785 ASSAY OF IGE: CPT | Performed by: ALLERGY & IMMUNOLOGY

## 2023-04-03 PROCEDURE — 99204 PR OFFICE/OUTPT VISIT, NEW, LEVL IV, 45-59 MIN: ICD-10-PCS | Mod: S$GLB,,, | Performed by: ALLERGY & IMMUNOLOGY

## 2023-04-03 RX ORDER — AZELASTINE 1 MG/ML
2 SPRAY, METERED NASAL 2 TIMES DAILY
Qty: 30 ML | Refills: 11 | Status: SHIPPED | OUTPATIENT
Start: 2023-04-03 | End: 2024-04-02

## 2023-04-03 NOTE — PROGRESS NOTES
Subjective:       Patient ID: Paula Armendariz is a 37 y.o. female.    Chief Complaint:  Sinusitis      HPI:       Patient's symptoms include clear rhinorrhea, itchy eyes, itchy nose, nasal congestion, sinus pressure, and sneezing. These symptoms are perennial with seasonal exacerbation. Worse in spring. Current triggers include exposure to weather changes and cats, animals . The patient has been suffering from these symptoms for approximately most of life but worse in last 2 years. The patient has tried  claritin, zyrtec, flonase  with fair relief of symptoms. At times, oft during pregnancy, still needed afrin, oral decongestants. Currently is nursing her 3rd child, 5 months old. Is finding that oral antihistamines are now drying her milk supply. Immunotherapy has never been tried. The patient has never had nasal polyps. The patient has a history of asthma. As child. The patient has no history of eczema. The patient takes antibiotics for sinusitis 2 times per year. But feels like has sinus infections more often. Symptoms do improve with antibiotics. The patient has not had sinus surgery in the past. Hx dev nasal septum. Congestion consistently worse on R.        Environmental History: Pets in the home: dogs (2).  [place]: hardwood floors  Tobacco Smoke in Home: no    Past Medical History:   Diagnosis Date    Allergy     Asthma     PCOS (polycystic ovarian syndrome)          Family History   Problem Relation Age of Onset    Allergies Mother     Asthma Mother     Allergies Father     Asthma Father     Eczema Brother     Asthma Brother     Diabetes Maternal Grandmother     Diabetes Maternal Grandfather     Allergies Son     Bronchiolitis Son     Ovarian cancer Neg Hx     Eclampsia Neg Hx    Mother w hx (invasive?) fungal sinusitis      Review of Systems   Constitutional:  Negative for activity change, fatigue and fever.   HENT:  Negative for congestion, postnasal drip, rhinorrhea, sinus pressure and sneezing.     Eyes:  Negative for discharge, redness and itching.   Respiratory:  Negative for cough, shortness of breath and wheezing.    Cardiovascular:  Negative for chest pain.   Gastrointestinal:  Negative for diarrhea, nausea and vomiting.   Genitourinary:  Negative for dysuria.   Musculoskeletal:  Negative for arthralgias and joint swelling.   Skin:  Negative for rash.   Neurological:  Negative for headaches.   Hematological:  Does not bruise/bleed easily.   Psychiatric/Behavioral:  Negative for behavioral problems and sleep disturbance.         Objective:   Physical Exam  Vitals and nursing note reviewed.   Constitutional:       General: She is not in acute distress.     Appearance: She is well-developed.   HENT:      Head: Normocephalic.      Right Ear: Tympanic membrane and external ear normal.      Left Ear: Tympanic membrane and external ear normal.      Nose: No septal deviation, mucosal edema or rhinorrhea.      Right Sinus: No maxillary sinus tenderness or frontal sinus tenderness.      Left Sinus: No maxillary sinus tenderness or frontal sinus tenderness.      Mouth/Throat:      Pharynx: Uvula midline. No uvula swelling.   Eyes:      General:         Right eye: No discharge.         Left eye: No discharge.      Conjunctiva/sclera: Conjunctivae normal.   Cardiovascular:      Rate and Rhythm: Normal rate and regular rhythm.   Pulmonary:      Effort: Pulmonary effort is normal. No respiratory distress.      Breath sounds: Normal breath sounds. No wheezing.   Abdominal:      General: Bowel sounds are normal.      Palpations: Abdomen is soft.      Tenderness: There is no abdominal tenderness.   Musculoskeletal:         General: No tenderness. Normal range of motion.      Cervical back: Normal range of motion and neck supple.   Lymphadenopathy:      Cervical: No cervical adenopathy.   Skin:     General: Skin is warm.      Findings: No erythema or rash.   Neurological:      Mental Status: She is alert and oriented to  person, place, and time.   Psychiatric:         Behavior: Behavior normal.         Thought Content: Thought content normal.         Judgment: Judgment normal.           No results found for: IGGSERUM, IGM, IGA     No results found for: IGE    Eos #   Date Value Ref Range Status   10/11/2022 0.2 0.0 - 0.5 K/uL Final   10/10/2022 0.2 0.0 - 0.5 K/uL Final   09/22/2022 0.1 0.0 - 0.5 K/uL Final     Eosinophil %   Date Value Ref Range Status   10/11/2022 1.6 0.0 - 8.0 % Final   10/10/2022 2.2 0.0 - 8.0 % Final   09/22/2022 1.2 0.0 - 8.0 % Final           Assessment:       1. Chronic rhinitis    2. Recurrent sinusitis         Plan:       Paula was seen today for sinusitis.    Diagnoses and all orders for this visit:    Chronic rhinitis  -     Immunoglobulins (IgG, IgA, IgM) Quantitative; Future  -     IgE; Future  -     Cat epithelium IgE; Future  -     Dog dander IgE; Future  -     D. farinae IgE; Future  -     D. pteronyssinus IgE; Future  -     Aspergillus fumagatus IgE; Future  -     Allergen-Alternaria Alternata; Future  -     Cockroach, American IgE; Future  -     Bahia grass IgE; Future  -     Keyur IgE; Future  -     Oak, white IgE; Future  -     Allergen-Cedar; Future  -     Allergen, Pecan Tree IgE; Future  -     Ragweed, short, common IgE; Future  -     Marsh elder, rough IgE; Future  -     Plantain, English IgE; Future  -     S.pneumoniae IgG Serotypes; Future    Recurrent sinusitis    Other orders  -     azelastine (ASTELIN) 137 mcg (0.1 %) nasal spray; 2 sprays (274 mcg total) by Nasal route 2 (two) times daily.    Cont flonase sensimist 2 sen twice daily  Fu pending results

## 2023-04-06 LAB
A ALTERNATA IGE QN: <0.1 KU/L
A FUMIGATUS IGE QN: <0.1 KU/L
BAHIA GRASS IGE QN: 0.95 KU/L
CAT DANDER IGE QN: <0.1 KU/L
CEDAR IGE QN: <0.1 KU/L
COMMON RAGWEED IGE QN: <0.1 KU/L
D FARINAE IGE QN: <0.1 KU/L
D PTERONYSS IGE QN: <0.1 KU/L
DEPRECATED A ALTERNATA IGE RAST QL: NORMAL
DEPRECATED A FUMIGATUS IGE RAST QL: NORMAL
DEPRECATED BAHIA GRASS IGE RAST QL: ABNORMAL
DEPRECATED CAT DANDER IGE RAST QL: NORMAL
DEPRECATED CEDAR IGE RAST QL: NORMAL
DEPRECATED COMMON RAGWEED IGE RAST QL: NORMAL
DEPRECATED D FARINAE IGE RAST QL: NORMAL
DEPRECATED D PTERONYSS IGE RAST QL: NORMAL
DEPRECATED DOG DANDER IGE RAST QL: ABNORMAL
DEPRECATED ELDER IGE RAST QL: NORMAL
DEPRECATED ENGL PLANTAIN IGE RAST QL: NORMAL
DEPRECATED PECAN/HICK TREE IGE RAST QL: NORMAL
DEPRECATED ROACH IGE RAST QL: NORMAL
DEPRECATED TIMOTHY IGE RAST QL: ABNORMAL
DEPRECATED WHITE OAK IGE RAST QL: ABNORMAL
DOG DANDER IGE QN: 0.15 KU/L
ELDER IGE QN: <0.1 KU/L
ENGL PLANTAIN IGE QN: <0.1 KU/L
PECAN/HICK TREE IGE QN: <0.1 KU/L
ROACH IGE QN: <0.1 KU/L
TIMOTHY IGE QN: 1.29 KU/L
WHITE OAK IGE QN: 2.04 KU/L

## 2023-04-08 LAB
DEPRECATED S PNEUM12 IGG SER-MCNC: <0.3 UG/ML
DEPRECATED S PNEUM23 IGG SER-MCNC: <0.3 UG/ML
DEPRECATED S PNEUM4 IGG SER-MCNC: <0.3 UG/ML
DEPRECATED S PNEUM8 IGG SER-MCNC: 0.7 UG/ML
DEPRECATED S PNEUM9 IGG SER-MCNC: <0.3 UG/ML
S PN DA SERO 19F IGG SER-MCNC: 1.1 UG/ML
S PNEUM DA 1 IGG SER-MCNC: 0.9 UG/ML
S PNEUM DA 14 IGG SER-MCNC: 3.1
S PNEUM DA 18C IGG SER-MCNC: 1
S PNEUM DA 3 IGG SER-MCNC: 2.1 UG/ML
S PNEUM DA 5 IGG SER-MCNC: 2.2 UG/ML
S PNEUM DA 6B IGG SER-MCNC: <0.3 UG/ML
S PNEUM DA 7F IGG SER-MCNC: 0.6 UG/ML
S PNEUM DA 9V IGG SER-MCNC: <0.3 UG/ML

## 2023-04-10 ENCOUNTER — TELEPHONE (OUTPATIENT)
Dept: ALLERGY | Facility: CLINIC | Age: 38
End: 2023-04-10
Payer: COMMERCIAL

## 2023-04-10 NOTE — PROGRESS NOTES
Please call to let know that evaluation of pt's immune system showed that pt may benefit from an extra vaccine, Pneumovax, to decrease frequency of infections, sinusitis symptoms. Please schedule follow up appointment to review labs and discuss Pneumovax vaccine.  Allergy tests show notable positives to common tree and grass pollens.

## 2023-04-10 NOTE — TELEPHONE ENCOUNTER
----- Message from Madi Bustamante MD sent at 4/10/2023  9:08 AM CDT -----  Please call to let know that evaluation of pt's immune system showed that pt may benefit from an extra vaccine, Pneumovax, to decrease frequency of infections, sinusitis symptoms. Please schedule follow up appointment to review labs and discuss Pneumovax vaccine.  Allergy tests show notable positives to common tree and grass pollens.

## 2025-04-17 ENCOUNTER — ON-DEMAND VIRTUAL (OUTPATIENT)
Dept: URGENT CARE | Facility: CLINIC | Age: 40
End: 2025-04-17
Payer: COMMERCIAL

## 2025-04-17 DIAGNOSIS — B37.9 ANTIBIOTIC-INDUCED YEAST INFECTION: ICD-10-CM

## 2025-04-17 DIAGNOSIS — J02.9 SORE THROAT: Primary | ICD-10-CM

## 2025-04-17 DIAGNOSIS — T36.95XA ANTIBIOTIC-INDUCED YEAST INFECTION: ICD-10-CM

## 2025-04-17 RX ORDER — AMOXICILLIN AND CLAVULANATE POTASSIUM 875; 125 MG/1; MG/1
1 TABLET, FILM COATED ORAL EVERY 12 HOURS
Qty: 20 TABLET | Refills: 0 | Status: SHIPPED | OUTPATIENT
Start: 2025-04-17 | End: 2025-04-27

## 2025-04-17 RX ORDER — FLUCONAZOLE 150 MG/1
150 TABLET ORAL DAILY
Qty: 2 TABLET | Refills: 0 | Status: SHIPPED | OUTPATIENT
Start: 2025-04-17 | End: 2025-04-19

## 2025-04-17 RX ORDER — PREDNISONE 20 MG/1
20 TABLET ORAL DAILY
Qty: 5 TABLET | Refills: 0 | Status: SHIPPED | OUTPATIENT
Start: 2025-04-17 | End: 2025-04-22

## 2025-04-17 NOTE — PROGRESS NOTES
Subjective:      Patient ID: Paula Armendariz is a 39 y.o. female.    Vitals:  vitals were not taken for this visit.     Chief Complaint: Sore Throat      Visit Type: TELE AUDIOVISUAL - This visit was conducted virtually based on  subjective information and limited objective exam    Present with the patient at the time of consultation: TELEMED PRESENT WITH PATIENT: None  LOCATION OF PATIENT [place], [place]  Two patient identifiers used to verify patient- saying out date of birth and full name.       Past Medical History:   Diagnosis Date    Allergy     Asthma     PCOS (polycystic ovarian syndrome)      Past Surgical History:   Procedure Laterality Date    TONSILLECTOMY       Review of patient's allergies indicates:   Allergen Reactions    Adhesive Rash     Medications Ordered Prior to Encounter[1]  Family History   Problem Relation Name Age of Onset    Allergies Mother      Asthma Mother      Allergies Father      Asthma Father      Eczema Brother      Asthma Brother      Diabetes Maternal Grandmother      Diabetes Maternal Grandfather      Allergies Son      Bronchiolitis Son      Ovarian cancer Neg Hx      Eclampsia Neg Hx         Medications Ordered                [id number] [place] [address] [employer] [address] [address]    Telephone: [phone]   Fax: [phone]   Hours: Not open 24 hours                         E-Prescribed (3 of 3)              amoxicillin-clavulanate 875-125mg (AUGMENTIN) 875-125 mg per tablet    Sig: Take 1 tablet by mouth every 12 (twelve) hours. for 10 days       Start: 4/17/25     Quantity: 20 tablet Refills: 0                         fluconazole (DIFLUCAN) 150 MG Tab    Sig: Take 1 tablet (150 mg total) by mouth once daily. for 2 doses       Start: 4/17/25     Quantity: 2 tablet Refills: 0                         predniSONE (DELTASONE) 20 MG tablet    Sig: Take 1 tablet (20 mg total) by mouth once  daily. for 5 days       Start: 4/17/25     Quantity: 5 tablet Refills: 0                           Ohs Peq Odvv Intake    4/17/2025  9:54 AM CDT - Filed by Patient   What is your current physical address in the event of a medical emergency? [address] [place] [address]   Are you able to take your vital signs? No   Please attach any relevant images or files    Is your employer contracted with [facility]? No         38 yo female c/o sore throat with trouble swallowing,hoarseness, and fever since this morning.    TMAX 101.3, resolved with advil.   Two kids at home positive for strep recently          Constitution: Positive for fever. Negative for activity change, appetite change and chills.   HENT:  Positive for postnasal drip, sore throat and voice change. Negative for congestion, sinus pain, sinus pressure and trouble swallowing.    Respiratory:  Negative for cough, sputum production and shortness of breath.    Gastrointestinal:  Negative for abdominal pain, nausea, vomiting, constipation and diarrhea.        Objective:   The physical exam was conducted virtually.    Pt is alert and oriented.  Speech is clear and coherent.  Speaking in complete sentences.  No distress.  Mood and affect are normal.     Limited physical exam due to virtual visit.         Assessment:     1. Sore throat    2. Antibiotic-induced yeast infection        Plan:         Thank you for choosing [facility]!    Our goal in the [facility] is to always provide outstanding medical care. You may receive a survey by mail or e-mail in the next week regarding your experience today. We would greatly appreciate you completing and returning the survey. Your feedback provides us with a way to recognize our staff who provide very good care, and it helps us learn how to improve when your experience was below our aspiration of excellence.         We appreciate you trusting us with your medical care. We  hope you feel better soon. We will be happy to take care of you for all of your future medical needs.    You must understand that you've received an Urgent Care treatment only and that you may be released before all your medical problems are known or treated. You, the patient, will arrange for follow up care as instructed.    Follow up with your PCP or specialty clinic as directed in the next 1-2 weeks if not improved or as needed.  You can call [phone] to schedule an appointment with the appropriate provider.    If your condition worsens we recommend that you receive another evaluation in person, with your primary care provider, urgent care or at the emergency room immediately or contact your primary medical clinics after hours call service to discuss your concerns.         Sore throat  Comments:  centor score: 4, possibility for strep  exposure to strep  rx augmentin  Orders:  -     amoxicillin-clavulanate 875-125mg (AUGMENTIN) 875-125 mg per tablet; Take 1 tablet by mouth every 12 (twelve) hours. for 10 days  Dispense: 20 tablet; Refill: 0  -     predniSONE (DELTASONE) 20 MG tablet; Take 1 tablet (20 mg total) by mouth once daily. for 5 days  Dispense: 5 tablet; Refill: 0    Antibiotic-induced yeast infection  -     fluconazole (DIFLUCAN) 150 MG Tab; Take 1 tablet (150 mg total) by mouth once daily. for 2 doses  Dispense: 2 tablet; Refill: 0                         [1]   Current Outpatient Medications on File Prior to Visit   Medication Sig Dispense Refill    azelastine (ASTELIN) 137 mcg (0.1 %) nasal spray 2 sprays (274 mcg total) by Nasal route 2 (two) times daily. 30 mL 11    sertraline (ZOLOFT) 50 MG tablet Take 1 tablet (50 mg total) by mouth once daily. 30 tablet 12     No current facility-administered medications on file prior to visit.